# Patient Record
Sex: MALE | Race: BLACK OR AFRICAN AMERICAN | NOT HISPANIC OR LATINO | Employment: OTHER | URBAN - METROPOLITAN AREA
[De-identification: names, ages, dates, MRNs, and addresses within clinical notes are randomized per-mention and may not be internally consistent; named-entity substitution may affect disease eponyms.]

---

## 2017-08-30 ENCOUNTER — HOSPITAL ENCOUNTER (INPATIENT)
Facility: HOSPITAL | Age: 47
LOS: 8 days | Discharge: HOME | End: 2017-09-07
Attending: EMERGENCY MEDICINE | Admitting: OTOLARYNGOLOGY
Payer: MEDICAID

## 2017-08-30 DIAGNOSIS — C32.9 SQUAMOUS CELL CARCINOMA OF LARYNX: ICD-10-CM

## 2017-08-30 DIAGNOSIS — J38.7 MASS OF LARYNX: ICD-10-CM

## 2017-08-30 DIAGNOSIS — E11.65 UNCONTROLLED TYPE 2 DIABETES MELLITUS WITH HYPERGLYCEMIA, WITH LONG-TERM CURRENT USE OF INSULIN: ICD-10-CM

## 2017-08-30 DIAGNOSIS — Z78.9 ON ENTERAL NUTRITION: ICD-10-CM

## 2017-08-30 DIAGNOSIS — R00.0 SINUS TACHYCARDIA: ICD-10-CM

## 2017-08-30 DIAGNOSIS — J38.7 LARYNGEAL MASS: ICD-10-CM

## 2017-08-30 DIAGNOSIS — R06.1 STRIDOR: Primary | ICD-10-CM

## 2017-08-30 DIAGNOSIS — Z93.0 TRACHEOSTOMY DEPENDENCE: ICD-10-CM

## 2017-08-30 DIAGNOSIS — J15.1 PNEUMONIA OF RIGHT LOWER LOBE DUE TO PSEUDOMONAS SPECIES: ICD-10-CM

## 2017-08-30 DIAGNOSIS — R13.12 DYSPHAGIA, OROPHARYNGEAL: ICD-10-CM

## 2017-08-30 DIAGNOSIS — Z79.4 UNCONTROLLED TYPE 2 DIABETES MELLITUS WITH HYPERGLYCEMIA, WITH LONG-TERM CURRENT USE OF INSULIN: ICD-10-CM

## 2017-08-30 LAB
ALBUMIN SERPL BCP-MCNC: 3.5 G/DL
ALLENS TEST: ABNORMAL
ALP SERPL-CCNC: 60 U/L
ALT SERPL W/O P-5'-P-CCNC: 18 U/L
ANION GAP SERPL CALC-SCNC: 8 MMOL/L
APTT BLDCRRT: 24.5 SEC
AST SERPL-CCNC: 19 U/L
BASOPHILS # BLD AUTO: 0 K/UL
BASOPHILS NFR BLD: 0 %
BILIRUB SERPL-MCNC: 0.6 MG/DL
BUN SERPL-MCNC: 18 MG/DL
CALCIUM SERPL-MCNC: 9.6 MG/DL
CHLORIDE SERPL-SCNC: 98 MMOL/L
CO2 SERPL-SCNC: 34 MMOL/L
CREAT SERPL-MCNC: 0.9 MG/DL
DELSYS: ABNORMAL
DIFFERENTIAL METHOD: ABNORMAL
EOSINOPHIL # BLD AUTO: 0 K/UL
EOSINOPHIL NFR BLD: 0.1 %
ERYTHROCYTE [DISTWIDTH] IN BLOOD BY AUTOMATED COUNT: 15.8 %
EST. GFR  (AFRICAN AMERICAN): >60 ML/MIN/1.73 M^2
EST. GFR  (NON AFRICAN AMERICAN): >60 ML/MIN/1.73 M^2
GLUCOSE SERPL-MCNC: 78 MG/DL
HCO3 UR-SCNC: 37.1 MMOL/L (ref 24–28)
HCT VFR BLD AUTO: 37.7 %
HGB BLD-MCNC: 13.2 G/DL
INR PPP: 0.9
LYMPHOCYTES # BLD AUTO: 0.4 K/UL
LYMPHOCYTES NFR BLD: 4 %
MCH RBC QN AUTO: 30.7 PG
MCHC RBC AUTO-ENTMCNC: 35 G/DL
MCV RBC AUTO: 88 FL
MODE: ABNORMAL
MONOCYTES # BLD AUTO: 0.1 K/UL
MONOCYTES NFR BLD: 0.7 %
NEUTROPHILS # BLD AUTO: 9.7 K/UL
NEUTROPHILS NFR BLD: 95 %
PCO2 BLDA: 60.7 MMHG (ref 35–45)
PH SMN: 7.39 [PH] (ref 7.35–7.45)
PLATELET # BLD AUTO: 266 K/UL
PMV BLD AUTO: 9.4 FL
PO2 BLDA: 77 MMHG (ref 80–100)
POC BE: 12 MMOL/L
POC SATURATED O2: 95 % (ref 95–100)
POC TCO2: 39 MMOL/L (ref 23–27)
POTASSIUM SERPL-SCNC: 4.4 MMOL/L
PROT SERPL-MCNC: 7.5 G/DL
PROTHROMBIN TIME: 10 SEC
RBC # BLD AUTO: 4.3 M/UL
SAMPLE: ABNORMAL
SITE: ABNORMAL
SODIUM SERPL-SCNC: 140 MMOL/L
SP02: 100
WBC # BLD AUTO: 10.18 K/UL

## 2017-08-30 PROCEDURE — 85730 THROMBOPLASTIN TIME PARTIAL: CPT

## 2017-08-30 PROCEDURE — 25000003 PHARM REV CODE 250: Performed by: STUDENT IN AN ORGANIZED HEALTH CARE EDUCATION/TRAINING PROGRAM

## 2017-08-30 PROCEDURE — 88331 PATH CONSLTJ SURG 1 BLK 1SPC: CPT | Performed by: PATHOLOGY

## 2017-08-30 PROCEDURE — 36000 PLACE NEEDLE IN VEIN: CPT

## 2017-08-30 PROCEDURE — 63600175 PHARM REV CODE 636 W HCPCS: Performed by: STUDENT IN AN ORGANIZED HEALTH CARE EDUCATION/TRAINING PROGRAM

## 2017-08-30 PROCEDURE — 88331 PATH CONSLTJ SURG 1 BLK 1SPC: CPT | Mod: 26,,, | Performed by: PATHOLOGY

## 2017-08-30 PROCEDURE — 31575 DIAGNOSTIC LARYNGOSCOPY: CPT | Mod: ,,, | Performed by: OTOLARYNGOLOGY

## 2017-08-30 PROCEDURE — 99233 SBSQ HOSP IP/OBS HIGH 50: CPT | Mod: 25,,, | Performed by: OTOLARYNGOLOGY

## 2017-08-30 PROCEDURE — 99285 EMERGENCY DEPT VISIT HI MDM: CPT | Mod: 25

## 2017-08-30 PROCEDURE — 99284 EMERGENCY DEPT VISIT MOD MDM: CPT | Mod: ,,, | Performed by: EMERGENCY MEDICINE

## 2017-08-30 PROCEDURE — 88305 TISSUE EXAM BY PATHOLOGIST: CPT | Mod: 26,,, | Performed by: PATHOLOGY

## 2017-08-30 PROCEDURE — 80053 COMPREHEN METABOLIC PANEL: CPT

## 2017-08-30 PROCEDURE — 20000000 HC ICU ROOM

## 2017-08-30 PROCEDURE — A4216 STERILE WATER/SALINE, 10 ML: HCPCS | Performed by: STUDENT IN AN ORGANIZED HEALTH CARE EDUCATION/TRAINING PROGRAM

## 2017-08-30 PROCEDURE — 85025 COMPLETE CBC W/AUTO DIFF WBC: CPT

## 2017-08-30 PROCEDURE — 85610 PROTHROMBIN TIME: CPT

## 2017-08-30 PROCEDURE — 88305 TISSUE EXAM BY PATHOLOGIST: CPT | Performed by: PATHOLOGY

## 2017-08-30 RX ORDER — SODIUM CHLORIDE AND POTASSIUM CHLORIDE 150; 450 MG/100ML; MG/100ML
INJECTION, SOLUTION INTRAVENOUS CONTINUOUS
Status: DISCONTINUED | OUTPATIENT
Start: 2017-08-30 | End: 2017-08-31

## 2017-08-30 RX ORDER — HYDRALAZINE HYDROCHLORIDE 20 MG/ML
10 INJECTION INTRAMUSCULAR; INTRAVENOUS EVERY 6 HOURS PRN
Status: DISCONTINUED | OUTPATIENT
Start: 2017-08-30 | End: 2017-09-07 | Stop reason: HOSPADM

## 2017-08-30 RX ORDER — MORPHINE SULFATE 2 MG/ML
2 INJECTION, SOLUTION INTRAMUSCULAR; INTRAVENOUS EVERY 4 HOURS PRN
Status: DISCONTINUED | OUTPATIENT
Start: 2017-08-30 | End: 2017-09-01

## 2017-08-30 RX ORDER — ONDANSETRON 8 MG/1
8 TABLET, ORALLY DISINTEGRATING ORAL EVERY 8 HOURS PRN
Status: DISCONTINUED | OUTPATIENT
Start: 2017-08-30 | End: 2017-09-07 | Stop reason: HOSPADM

## 2017-08-30 RX ORDER — DIPHENHYDRAMINE HYDROCHLORIDE 50 MG/ML
25 INJECTION INTRAMUSCULAR; INTRAVENOUS EVERY 4 HOURS PRN
Status: DISCONTINUED | OUTPATIENT
Start: 2017-08-30 | End: 2017-09-07 | Stop reason: HOSPADM

## 2017-08-30 RX ORDER — HYDROCODONE BITARTRATE AND ACETAMINOPHEN 5; 325 MG/1; MG/1
1 TABLET ORAL EVERY 4 HOURS PRN
Status: DISCONTINUED | OUTPATIENT
Start: 2017-08-30 | End: 2017-09-01

## 2017-08-30 RX ORDER — INSULIN GLARGINE 100 [IU]/ML
20 INJECTION, SOLUTION SUBCUTANEOUS NIGHTLY
Status: ON HOLD | COMMUNITY
End: 2017-09-06 | Stop reason: HOSPADM

## 2017-08-30 RX ORDER — INSULIN ASPART 100 [IU]/ML
INJECTION, SOLUTION INTRAVENOUS; SUBCUTANEOUS
Status: ON HOLD | COMMUNITY
End: 2017-09-06 | Stop reason: HOSPADM

## 2017-08-30 RX ORDER — INSULIN ASPART 100 [IU]/ML
0-5 INJECTION, SOLUTION INTRAVENOUS; SUBCUTANEOUS EVERY 6 HOURS PRN
Status: DISCONTINUED | OUTPATIENT
Start: 2017-08-30 | End: 2017-09-01

## 2017-08-30 RX ORDER — SODIUM CHLORIDE 0.9 % (FLUSH) 0.9 %
3 SYRINGE (ML) INJECTION EVERY 8 HOURS
Status: DISCONTINUED | OUTPATIENT
Start: 2017-08-30 | End: 2017-09-07 | Stop reason: HOSPADM

## 2017-08-30 RX ORDER — GLUCAGON 1 MG
1 KIT INJECTION
Status: DISCONTINUED | OUTPATIENT
Start: 2017-08-30 | End: 2017-09-01

## 2017-08-30 RX ADMIN — POTASSIUM CHLORIDE AND SODIUM CHLORIDE: 450; 150 INJECTION, SOLUTION INTRAVENOUS at 11:08

## 2017-08-30 RX ADMIN — Medication 3 ML: at 10:08

## 2017-08-30 RX ADMIN — INSULIN ASPART 5 UNITS: 100 INJECTION, SOLUTION INTRAVENOUS; SUBCUTANEOUS at 11:08

## 2017-08-30 RX ADMIN — INSULIN DETEMIR 20 UNITS: 100 INJECTION, SOLUTION SUBCUTANEOUS at 10:08

## 2017-08-30 NOTE — ED PROVIDER NOTES
Encounter Date: 8/30/2017    SCRIBE #1 NOTE: I, Cele Torres, am scribing for, and in the presence of, Dr. Arroyo.       History     Chief Complaint   Patient presents with    Shortness of Breath     transfer from [facility] for tracheal mass.      Time seen by provider: 5:32 PM    This is a 47 y.o. male with a tracheal mass who presents from [facility] with complaint of SOB for approximately 1.5 months.  The patient notes he has not seen a physician for 4 months and that he at first thought that the discomfort was due to asthma. The patient denies trouble swallowing but indicates associated weight loss of 25 lbs over approximately 2-3 months. He reports improvement in the discomfort sitting up but worsening laying down.      The history is provided by the patient.     Review of patient's allergies indicates:  Allergies not on file  Past Medical History:   Diagnosis Date    Insulin dependent diabetes mellitus      History reviewed. No pertinent surgical history.  History reviewed. No pertinent family history.  Social History   Substance Use Topics    Smoking status: Not on file    Smokeless tobacco: Not on file    Alcohol use Not on file     Review of Systems   Constitutional: Positive for unexpected weight change (25 lbs weight loss).   HENT: Negative for trouble swallowing.    Eyes: Negative for visual disturbance.   Respiratory: Positive for shortness of breath.    Cardiovascular: Negative for chest pain.   Gastrointestinal: Negative for abdominal pain.   Genitourinary: Negative for dysuria.   Musculoskeletal: Negative for back pain.   Skin: Negative for pallor.   Neurological: Negative for headaches.       Physical Exam     Initial Vitals [08/30/17 1725]   BP Pulse Resp Temp SpO2   (!) 151/92 90 20 98.1 °F (36.7 °C) 98 %      MAP       111.67         Physical Exam    Nursing note and vitals reviewed.  Constitutional: He appears cachectic. He appears ill. No distress.   Hoarse voice. The  patient is not hypoxic.   HENT:   Head: Normocephalic and atraumatic.   Mouth/Throat: Oropharynx is clear and moist.   Eyes: Conjunctivae are normal.   Neck: Normal range of motion. Stridor present. Tracheal tenderness present. No tracheal deviation present.   Thyroid mass.   Cardiovascular: Normal rate, regular rhythm and normal heart sounds.   Pulmonary/Chest: Breath sounds normal. No respiratory distress.   Abdominal: Soft. He exhibits no distension. There is no tenderness.   Musculoskeletal: Normal range of motion.   Neurological: He is alert and oriented to person, place, and time.   Skin: Skin is warm and dry.         ED Course   Procedures  Labs Reviewed   CBC W/ AUTO DIFFERENTIAL - Abnormal; Notable for the following:        Result Value    RBC 4.30 (*)     Hemoglobin 13.2 (*)     Hematocrit 37.7 (*)     RDW 15.8 (*)     Gran # 9.7 (*)     Lymph # 0.4 (*)     Mono # 0.1 (*)     Gran% 95.0 (*)     Lymph% 4.0 (*)     Mono% 0.7 (*)     All other components within normal limits   COMPREHENSIVE METABOLIC PANEL - Abnormal; Notable for the following:     CO2 34 (*)     All other components within normal limits   ISTAT PROCEDURE - Abnormal; Notable for the following:     POC PCO2 60.7 (*)     POC PO2 77 (*)     POC HCO3 37.1 (*)     POC TCO2 39 (*)     All other components within normal limits   PROTIME-INR   APTT   BASIC METABOLIC PANEL   CBC W/ AUTO DIFFERENTIAL   POCT GLUCOSE MONITORING CONTINUOUS             Medical Decision Making:   History:   Old Medical Records: I decided to obtain old medical records.  Initial Assessment:   Emergent evaluation of a 47 y.o. male who presents as a transfer from outside facility with thyroid mass. He presented at the outside facility with stridor.  His symptoms progressively worsened over the last several months.  Now he is hoarse and with audible stridor.  He has had significant weight loss. The patient has been lost to follow up. He was sent here for ENT evaluation and  received Decadron and racemic epinephrine from outside facility.  ENT notified of his arrival. Airway is stable at this time.  Clinical Tests:   Lab Tests: Ordered and Reviewed  Other:   I have discussed this case with another health care provider.            Scribe Attestation:   Scribe #1: I performed the above scribed service and the documentation accurately describes the services I performed. I attest to the accuracy of the note.    Attending Attestation:           Physician Attestation for Scribe:  Physician Attestation Statement for Scribe #1: I, Dr. Arroyo, reviewed documentation, as scribed by Cele Torres in my presence, and it is both accurate and complete.         Attending ED Notes:   Evaluated by ENT, will admit to ICU for further management           ED Course      Clinical Impression:   The primary encounter diagnosis was Stridor. Diagnoses of Mass of larynx, Laryngeal mass, Dysphagia, oropharyngeal, and Sinus tachycardia were also pertinent to this visit.    Disposition:   Disposition: Admitted  Condition: Molly Arroyo MD  09/01/17 1544

## 2017-08-30 NOTE — ED TRIAGE NOTES
LOC: The patient is awake, alert and aware of environment with an appropriate affect, the patient is oriented x 3 and speaking appropriately.  APPEARANCE: Patient resting comfortably and in no acute distress, patient is clean and well groomed, patient's clothing is properly fastened.   SKIN: The skin is warm and dry, color consistent with ethnicity, patient has normal skin turgor and moist mucus membranes, skin intact, no breakdown or bruising noted.  MUSCULOSKELETAL: Patient moving all extremities spontaneously, no obvious swelling or deformities noted.  RESPIRATORY: Airway is open and patent, respirations are spontaneous, patient has a normal effort and rate, no accessory muscle use noted.  ABDOMEN: Soft and non tender to palpation, no distention noted.  NEUROLOGIC:  facial expression is symmetrical, patient moving all extremities spontaneously, normal sensation in all extremities when touched with a finger.  Follows all commands appropriately.  ENT: Pt with hoarseness when talking.  Pt reports pain to throat when talking.

## 2017-08-31 ENCOUNTER — ANESTHESIA EVENT (OUTPATIENT)
Dept: SURGERY | Facility: HOSPITAL | Age: 47
End: 2017-08-31
Payer: MEDICAID

## 2017-08-31 ENCOUNTER — SURGERY (OUTPATIENT)
Age: 47
End: 2017-08-31

## 2017-08-31 ENCOUNTER — ANESTHESIA (OUTPATIENT)
Dept: SURGERY | Facility: HOSPITAL | Age: 47
End: 2017-08-31
Payer: MEDICAID

## 2017-08-31 PROBLEM — R13.12 DYSPHAGIA, OROPHARYNGEAL: Status: ACTIVE | Noted: 2017-08-31

## 2017-08-31 PROBLEM — J38.7 LARYNGEAL MASS: Status: ACTIVE | Noted: 2017-08-31

## 2017-08-31 LAB
ANION GAP SERPL CALC-SCNC: 13 MMOL/L
APTT BLDCRRT: 25.3 SEC
BASOPHILS # BLD AUTO: 0 K/UL
BASOPHILS NFR BLD: 0 %
BUN SERPL-MCNC: 19 MG/DL
CALCIUM SERPL-MCNC: 9.6 MG/DL
CHLORIDE SERPL-SCNC: 97 MMOL/L
CO2 SERPL-SCNC: 23 MMOL/L
CREAT SERPL-MCNC: 1.2 MG/DL
DIFFERENTIAL METHOD: ABNORMAL
EOSINOPHIL # BLD AUTO: 0 K/UL
EOSINOPHIL NFR BLD: 0 %
ERYTHROCYTE [DISTWIDTH] IN BLOOD BY AUTOMATED COUNT: 15.4 %
EST. GFR  (AFRICAN AMERICAN): >60 ML/MIN/1.73 M^2
EST. GFR  (NON AFRICAN AMERICAN): >60 ML/MIN/1.73 M^2
GLUCOSE SERPL-MCNC: 313 MG/DL
HCT VFR BLD AUTO: 35.3 %
HGB BLD-MCNC: 12.2 G/DL
INR PPP: 1
LYMPHOCYTES # BLD AUTO: 0.9 K/UL
LYMPHOCYTES NFR BLD: 5.5 %
MAGNESIUM SERPL-MCNC: 2.2 MG/DL
MCH RBC QN AUTO: 31 PG
MCHC RBC AUTO-ENTMCNC: 34.6 G/DL
MCV RBC AUTO: 90 FL
MONOCYTES # BLD AUTO: 0.7 K/UL
MONOCYTES NFR BLD: 4.1 %
NEUTROPHILS # BLD AUTO: 14.9 K/UL
NEUTROPHILS NFR BLD: 90.2 %
PHOSPHATE SERPL-MCNC: 3.3 MG/DL
PLATELET # BLD AUTO: 282 K/UL
PMV BLD AUTO: 9.8 FL
POCT GLUCOSE: 141 MG/DL (ref 70–110)
POCT GLUCOSE: 372 MG/DL (ref 70–110)
POCT GLUCOSE: 444 MG/DL (ref 70–110)
POCT GLUCOSE: 483 MG/DL (ref 70–110)
POCT GLUCOSE: 57 MG/DL (ref 70–110)
POCT GLUCOSE: 71 MG/DL (ref 70–110)
POTASSIUM SERPL-SCNC: 5.2 MMOL/L
PROTHROMBIN TIME: 10.6 SEC
RBC # BLD AUTO: 3.93 M/UL
SODIUM SERPL-SCNC: 133 MMOL/L
WBC # BLD AUTO: 16.5 K/UL

## 2017-08-31 PROCEDURE — 27000190 HC CPAP FULL FACE MASK W/VALVE

## 2017-08-31 PROCEDURE — 85610 PROTHROMBIN TIME: CPT

## 2017-08-31 PROCEDURE — 85025 COMPLETE CBC W/AUTO DIFF WBC: CPT

## 2017-08-31 PROCEDURE — 63600175 PHARM REV CODE 636 W HCPCS: Performed by: STUDENT IN AN ORGANIZED HEALTH CARE EDUCATION/TRAINING PROGRAM

## 2017-08-31 PROCEDURE — 93005 ELECTROCARDIOGRAM TRACING: CPT

## 2017-08-31 PROCEDURE — 84100 ASSAY OF PHOSPHORUS: CPT

## 2017-08-31 PROCEDURE — 80048 BASIC METABOLIC PNL TOTAL CA: CPT

## 2017-08-31 PROCEDURE — 25000003 PHARM REV CODE 250: Performed by: STUDENT IN AN ORGANIZED HEALTH CARE EDUCATION/TRAINING PROGRAM

## 2017-08-31 PROCEDURE — 0B110F4 BYPASS TRACHEA TO CUTANEOUS WITH TRACHEOSTOMY DEVICE, OPEN APPROACH: ICD-10-PCS | Performed by: OTOLARYNGOLOGY

## 2017-08-31 PROCEDURE — 93010 ELECTROCARDIOGRAM REPORT: CPT | Mod: ,,, | Performed by: INTERNAL MEDICINE

## 2017-08-31 PROCEDURE — 85730 THROMBOPLASTIN TIME PARTIAL: CPT

## 2017-08-31 PROCEDURE — 25000003 PHARM REV CODE 250: Performed by: NURSE ANESTHETIST, CERTIFIED REGISTERED

## 2017-08-31 PROCEDURE — A4216 STERILE WATER/SALINE, 10 ML: HCPCS | Performed by: STUDENT IN AN ORGANIZED HEALTH CARE EDUCATION/TRAINING PROGRAM

## 2017-08-31 PROCEDURE — 25000003 PHARM REV CODE 250: Performed by: OTOLARYNGOLOGY

## 2017-08-31 PROCEDURE — 31720 CLEARANCE OF AIRWAYS: CPT

## 2017-08-31 PROCEDURE — 94002 VENT MGMT INPAT INIT DAY: CPT

## 2017-08-31 PROCEDURE — 20000000 HC ICU ROOM

## 2017-08-31 PROCEDURE — 43246 EGD PLACE GASTROSTOMY TUBE: CPT | Mod: ,,, | Performed by: SURGERY

## 2017-08-31 PROCEDURE — 36000709 HC OR TIME LEV III EA ADD 15 MIN: Performed by: OTOLARYNGOLOGY

## 2017-08-31 PROCEDURE — 99232 SBSQ HOSP IP/OBS MODERATE 35: CPT | Mod: 25,,, | Performed by: SURGERY

## 2017-08-31 PROCEDURE — 83735 ASSAY OF MAGNESIUM: CPT

## 2017-08-31 PROCEDURE — 25000003 PHARM REV CODE 250: Performed by: PHYSICIAN ASSISTANT

## 2017-08-31 PROCEDURE — D9220A PRA ANESTHESIA: Mod: CRNA,,, | Performed by: NURSE ANESTHETIST, CERTIFIED REGISTERED

## 2017-08-31 PROCEDURE — 63600175 PHARM REV CODE 636 W HCPCS: Performed by: NURSE ANESTHETIST, CERTIFIED REGISTERED

## 2017-08-31 PROCEDURE — 25500020 PHARM REV CODE 255: Performed by: OTOLARYNGOLOGY

## 2017-08-31 PROCEDURE — 27201423 OPTIME MED/SURG SUP & DEVICES STERILE SUPPLY: Performed by: OTOLARYNGOLOGY

## 2017-08-31 PROCEDURE — 31600 PLANNED TRACHEOSTOMY: CPT | Mod: ,,, | Performed by: OTOLARYNGOLOGY

## 2017-08-31 PROCEDURE — D9220A PRA ANESTHESIA: Mod: ANES,,, | Performed by: ANESTHESIOLOGY

## 2017-08-31 PROCEDURE — 63600175 PHARM REV CODE 636 W HCPCS: Performed by: PHYSICIAN ASSISTANT

## 2017-08-31 PROCEDURE — 0CBS8ZX EXCISION OF LARYNX, VIA NATURAL OR ARTIFICIAL OPENING ENDOSCOPIC, DIAGNOSTIC: ICD-10-PCS | Performed by: OTOLARYNGOLOGY

## 2017-08-31 PROCEDURE — 36415 COLL VENOUS BLD VENIPUNCTURE: CPT

## 2017-08-31 PROCEDURE — 0DH63UZ INSERTION OF FEEDING DEVICE INTO STOMACH, PERCUTANEOUS APPROACH: ICD-10-PCS | Performed by: SURGERY

## 2017-08-31 PROCEDURE — 37000008 HC ANESTHESIA 1ST 15 MINUTES: Performed by: OTOLARYNGOLOGY

## 2017-08-31 PROCEDURE — 27000221 HC OXYGEN, UP TO 24 HOURS

## 2017-08-31 PROCEDURE — 99900035 HC TECH TIME PER 15 MIN (STAT)

## 2017-08-31 PROCEDURE — 37000009 HC ANESTHESIA EA ADD 15 MINS: Performed by: OTOLARYNGOLOGY

## 2017-08-31 PROCEDURE — 31535 LARYNGOSCOPY W/BIOPSY: CPT | Mod: 51,,, | Performed by: OTOLARYNGOLOGY

## 2017-08-31 PROCEDURE — 36000708 HC OR TIME LEV III 1ST 15 MIN: Performed by: OTOLARYNGOLOGY

## 2017-08-31 RX ORDER — HYDROMORPHONE HYDROCHLORIDE 1 MG/ML
0.5 INJECTION, SOLUTION INTRAMUSCULAR; INTRAVENOUS; SUBCUTANEOUS EVERY 4 HOURS PRN
Status: DISCONTINUED | OUTPATIENT
Start: 2017-08-31 | End: 2017-08-31

## 2017-08-31 RX ORDER — MIDAZOLAM HYDROCHLORIDE 1 MG/ML
6 INJECTION INTRAMUSCULAR; INTRAVENOUS
Status: DISCONTINUED | OUTPATIENT
Start: 2017-08-31 | End: 2017-09-07 | Stop reason: HOSPADM

## 2017-08-31 RX ORDER — CEFAZOLIN SODIUM 2 G/50ML
2 SOLUTION INTRAVENOUS
Status: DISCONTINUED | OUTPATIENT
Start: 2017-08-31 | End: 2017-09-01

## 2017-08-31 RX ORDER — MIDAZOLAM HYDROCHLORIDE 1 MG/ML
INJECTION, SOLUTION INTRAMUSCULAR; INTRAVENOUS
Status: DISCONTINUED | OUTPATIENT
Start: 2017-08-31 | End: 2017-08-31

## 2017-08-31 RX ORDER — VASOPRESSIN 20 [USP'U]/ML
INJECTION, SOLUTION INTRAMUSCULAR; SUBCUTANEOUS
Status: DISCONTINUED | OUTPATIENT
Start: 2017-08-31 | End: 2017-08-31

## 2017-08-31 RX ORDER — LIDOCAINE HYDROCHLORIDE AND EPINEPHRINE 10; 10 MG/ML; UG/ML
INJECTION, SOLUTION INFILTRATION; PERINEURAL
Status: DISCONTINUED | OUTPATIENT
Start: 2017-08-31 | End: 2017-08-31 | Stop reason: HOSPADM

## 2017-08-31 RX ORDER — HYDROMORPHONE HYDROCHLORIDE 2 MG/ML
INJECTION, SOLUTION INTRAMUSCULAR; INTRAVENOUS; SUBCUTANEOUS
Status: DISPENSED
Start: 2017-08-31 | End: 2017-08-31

## 2017-08-31 RX ORDER — KETAMINE HCL IN 0.9 % NACL 50 MG/5 ML
SYRINGE (ML) INTRAVENOUS
Status: DISCONTINUED | OUTPATIENT
Start: 2017-08-31 | End: 2017-08-31

## 2017-08-31 RX ORDER — METOPROLOL TARTRATE 1 MG/ML
5 INJECTION, SOLUTION INTRAVENOUS ONCE
Status: COMPLETED | OUTPATIENT
Start: 2017-08-31 | End: 2017-08-31

## 2017-08-31 RX ORDER — PHENYLEPHRINE HYDROCHLORIDE 10 MG/ML
INJECTION INTRAVENOUS
Status: DISCONTINUED | OUTPATIENT
Start: 2017-08-31 | End: 2017-08-31

## 2017-08-31 RX ORDER — SODIUM CHLORIDE 9 MG/ML
INJECTION, SOLUTION INTRAVENOUS CONTINUOUS
Status: DISCONTINUED | OUTPATIENT
Start: 2017-08-31 | End: 2017-09-01

## 2017-08-31 RX ORDER — HYDROMORPHONE HYDROCHLORIDE 1 MG/ML
0.5 INJECTION, SOLUTION INTRAMUSCULAR; INTRAVENOUS; SUBCUTANEOUS EVERY 4 HOURS PRN
Status: DISCONTINUED | OUTPATIENT
Start: 2017-08-31 | End: 2017-09-01

## 2017-08-31 RX ORDER — LORAZEPAM 2 MG/ML
1 INJECTION INTRAMUSCULAR ONCE
Status: COMPLETED | OUTPATIENT
Start: 2017-08-31 | End: 2017-08-31

## 2017-08-31 RX ORDER — LIDOCAINE HYDROCHLORIDE 40 MG/ML
SOLUTION TOPICAL
Status: DISCONTINUED | OUTPATIENT
Start: 2017-08-31 | End: 2017-08-31 | Stop reason: HOSPADM

## 2017-08-31 RX ORDER — DEXAMETHASONE SODIUM PHOSPHATE 4 MG/ML
8 INJECTION, SOLUTION INTRA-ARTICULAR; INTRALESIONAL; INTRAMUSCULAR; INTRAVENOUS; SOFT TISSUE
Status: DISCONTINUED | OUTPATIENT
Start: 2017-08-31 | End: 2017-09-01

## 2017-08-31 RX ORDER — ENOXAPARIN SODIUM 100 MG/ML
40 INJECTION SUBCUTANEOUS EVERY 24 HOURS
Status: DISCONTINUED | OUTPATIENT
Start: 2017-08-31 | End: 2017-09-07 | Stop reason: HOSPADM

## 2017-08-31 RX ORDER — ROCURONIUM BROMIDE 10 MG/ML
50 INJECTION, SOLUTION INTRAVENOUS
Status: DISCONTINUED | OUTPATIENT
Start: 2017-08-31 | End: 2017-09-07 | Stop reason: HOSPADM

## 2017-08-31 RX ORDER — FENTANYL CITRATE 50 UG/ML
200 INJECTION, SOLUTION INTRAMUSCULAR; INTRAVENOUS
Status: DISCONTINUED | OUTPATIENT
Start: 2017-08-31 | End: 2017-09-07 | Stop reason: HOSPADM

## 2017-08-31 RX ADMIN — FENTANYL CITRATE 200 MCG: 50 INJECTION INTRAMUSCULAR; INTRAVENOUS at 12:08

## 2017-08-31 RX ADMIN — EPHEDRINE SULFATE 10 MG: 50 INJECTION, SOLUTION INTRAMUSCULAR; INTRAVENOUS; SUBCUTANEOUS at 07:08

## 2017-08-31 RX ADMIN — MIDAZOLAM HYDROCHLORIDE 1 MG: 1 INJECTION, SOLUTION INTRAMUSCULAR; INTRAVENOUS at 07:08

## 2017-08-31 RX ADMIN — PHENYLEPHRINE HYDROCHLORIDE 200 MCG: 10 INJECTION INTRAVENOUS at 07:08

## 2017-08-31 RX ADMIN — DEXTROSE MONOHYDRATE 12.5 G: 25 INJECTION, SOLUTION INTRAVENOUS at 01:08

## 2017-08-31 RX ADMIN — Medication 3 ML: at 05:08

## 2017-08-31 RX ADMIN — DEXTROSE MONOHYDRATE 12.5 G: 25 INJECTION, SOLUTION INTRAVENOUS at 11:08

## 2017-08-31 RX ADMIN — EPHEDRINE SULFATE 15 MG: 50 INJECTION, SOLUTION INTRAMUSCULAR; INTRAVENOUS; SUBCUTANEOUS at 08:08

## 2017-08-31 RX ADMIN — HYDROMORPHONE HYDROCHLORIDE 0.5 MG: 1 INJECTION, SOLUTION INTRAMUSCULAR; INTRAVENOUS; SUBCUTANEOUS at 09:08

## 2017-08-31 RX ADMIN — DEXTROSE MONOHYDRATE 12.5 G: 25 INJECTION, SOLUTION INTRAVENOUS at 09:08

## 2017-08-31 RX ADMIN — Medication 3 ML: at 09:08

## 2017-08-31 RX ADMIN — METOPROLOL TARTRATE 5 MG: 5 INJECTION INTRAVENOUS at 10:08

## 2017-08-31 RX ADMIN — LIDOCAINE HYDROCHLORIDE 3 ML: 40 SPRAY LARYNGEAL; TRANSTRACHEAL at 08:08

## 2017-08-31 RX ADMIN — HYDRALAZINE HYDROCHLORIDE 10 MG: 20 INJECTION INTRAMUSCULAR; INTRAVENOUS at 10:08

## 2017-08-31 RX ADMIN — VASOPRESSIN 2 UNITS: 20 INJECTION INTRAVENOUS at 08:08

## 2017-08-31 RX ADMIN — SODIUM CHLORIDE: 0.9 INJECTION, SOLUTION INTRAVENOUS at 06:08

## 2017-08-31 RX ADMIN — MIDAZOLAM HYDROCHLORIDE 6 MG: 1 INJECTION, SOLUTION INTRAMUSCULAR; INTRAVENOUS at 12:08

## 2017-08-31 RX ADMIN — ENOXAPARIN SODIUM 40 MG: 100 INJECTION SUBCUTANEOUS at 05:08

## 2017-08-31 RX ADMIN — Medication 1 MG: at 07:08

## 2017-08-31 RX ADMIN — Medication 3 ML: at 01:08

## 2017-08-31 RX ADMIN — MORPHINE SULFATE 2 MG: 2 INJECTION, SOLUTION INTRAMUSCULAR; INTRAVENOUS at 02:08

## 2017-08-31 RX ADMIN — ROCURONIUM BROMIDE 50 MG: 10 INJECTION, SOLUTION INTRAVENOUS at 12:08

## 2017-08-31 RX ADMIN — LORAZEPAM 1 MG: 2 INJECTION INTRAMUSCULAR; INTRAVENOUS at 10:08

## 2017-08-31 RX ADMIN — HYDRALAZINE HYDROCHLORIDE 10 MG: 20 INJECTION INTRAMUSCULAR; INTRAVENOUS at 03:08

## 2017-08-31 RX ADMIN — Medication 20 MG: at 07:08

## 2017-08-31 RX ADMIN — MIDAZOLAM HYDROCHLORIDE 2 MG: 1 INJECTION, SOLUTION INTRAMUSCULAR; INTRAVENOUS at 07:08

## 2017-08-31 RX ADMIN — IOHEXOL 75 ML: 350 INJECTION, SOLUTION INTRAVENOUS at 05:08

## 2017-08-31 RX ADMIN — SODIUM CHLORIDE, SODIUM GLUCONATE, SODIUM ACETATE, POTASSIUM CHLORIDE, MAGNESIUM CHLORIDE, SODIUM PHOSPHATE, DIBASIC, AND POTASSIUM PHOSPHATE: .53; .5; .37; .037; .03; .012; .00082 INJECTION, SOLUTION INTRAVENOUS at 07:08

## 2017-08-31 RX ADMIN — LIDOCAINE HYDROCHLORIDE AND EPINEPHRINE 10 ML: 10; 10 INJECTION, SOLUTION INFILTRATION; PERINEURAL at 07:08

## 2017-08-31 NOTE — PROGRESS NOTES
[facility]  Otorhinolaryngology-Head & Neck Surgery  Progress Note    Subjective:     Post-Op Info:  Procedure(s) (LRB):  TRACHEOTOMY- AWAKE (N/A)   Day of Surgery  Hospital Day: 2     Interval History: NAEON. No desats.    Medications:  Continuous Infusions:   sodium chloride 0.9%       Scheduled Meds:   insulin detemir  20 Units Subcutaneous QHS    sodium chloride 0.9%  3 mL Intravenous Q8H     PRN Meds:dextrose 50%, diphenhydrAMINE, glucagon (human recombinant), hydrALAZINE, hydrocodone-acetaminophen 5-325mg, insulin aspart, morphine, ondansetron     Review of patient's allergies indicates:  No Known Allergies  Objective:     Vital Signs (24h Range):  Temp:  [98.1 °F (36.7 °C)-98.7 °F (37.1 °C)] 98.5 °F (36.9 °C)  Pulse:  [] 78  Resp:  [0-49] 39  SpO2:  [92 %-100 %] 99 %  BP: (132-179)/(74-98) 179/86        Lines/Drains/Airways     Peripheral Intravenous Line                 Peripheral IV - Single Lumen 08/30/17 2100 Left Wrist less than 1 day                Physical Exam  Resting in bed on room air  Inspiratory stridor, working hard to breath this morning, but satting high 90's  Neck: palpable landmarks    Significant Labs:  All pertinent labs from the last 24 hours have been reviewed.    Significant Diagnostics:  None    Assessment/Plan:     Laryngeal mass    Mr. Esteban is a 48 yo M with DM and HTN who presents as transfer from [facility] with stridor, hoarseness, and known laryngeal mass with CT evidence of bulky laryngeal disease very concerning for malignancy. No immediate airway risk as patient has had slowly progressive stridor and hoarseness fo r past 2 months. ABG with pCO2 of 60.7, pH 7.39. WBC 10.    - To OR for awake trach this morning  - Patient could potentially be intubated with small ETT if necessary o/n  - Continuous pulse ox/ telemetry  - NPO with MIVF  - Will obtain new diagnostic biopsies  - POCT glucose TID; insulin per home regiment  - Discussed  with Dr. Deleon, who agrees with plan            Quinn Cadena Jr., MD  Otorhinolaryngology-Head & Neck Surgery  [facility]

## 2017-08-31 NOTE — TRANSFER OF CARE
"Anesthesia Transfer of Care Note    Patient: Luís Esteban    Procedure(s) Performed: Procedure(s) (LRB):  TRACHEOTOMY- AWAKE (N/A)  LARYNGOSCOPY (N/A)    Patient location: ICU    Anesthesia Type: general    Transport from OR: Transported from OR intubated on 100% O2 by AMBU with assisted ventilation. Continuous ECG monitoring in transport. Continuous SpO2 monitoring in transport    Post pain: adequate analgesia    Post assessment: no apparent anesthetic complications and tolerated procedure well    Post vital signs: stable    Level of consciousness: sedated    Nausea/Vomiting: no nausea/vomiting    Complications: none    Transfer of care protocol was followed      Last vitals:   Visit Vitals  /72   Pulse 87   Temp 36.4 °C (97.5 °F) (Oral)   Resp (!) 21   Ht 5' 11" (1.803 m)   Wt 61 kg (134 lb 7.7 oz)   SpO2 100%   BMI 18.76 kg/m²     "

## 2017-08-31 NOTE — OP NOTE
DATE OF PROCEDURE:  08/31/2017    PREOPERATIVE DIAGNOSIS:  Obstructing larynx mass.    POSTOPERATIVE DIAGNOSIS:  Squamous cell carcinoma of the larynx.    PROCEDURE:  1.  Awake tracheostomy.  2.  Direct laryngoscopy with biopsy with use of operating telescope.    SURGEON:  Mk Deleon M.D.    ASSISTANTS:  1.  Wiliam Meeks M.D. (RES).  2.  Jacob Brunner, M.D. (RES).    ANESTHESIA:  Local/MAC/general endotracheal.    ESTIMATED BLOOD LOSS:  10 mL.    SPECIMENS:  Larynx mass for frozen and permanent.    INDICATIONS FOR PROCEDURE:  Mr. Esteban is a 47-year-old gentleman who   presented to the [facility] ER last night with a complaint of progressive shortness   of breath.  He reported a history of a biopsy of the larynx lesion in January   of this year.  There was concern that this was malignant at that time; however,   no treatment was rendered.  He reported that over the last several days, he has   become increasingly dyspneic, particularly when lying supine.  He was   transferred here from [facility] in [place].  Upon arrival,   he was noted to have hoarseness and inspiratory stridor.  On examination, he   was noted to have a large obstructing larynx mass.  However, his airway was   noted to be stable and had not significantly worsened over the last several   days.  In light of these findings, it was recommended that we proceed to the   Operating Room the following morning for awake tracheostomy and subsequently DL   with biopsy.  He was apprised of the risks, benefits and alternatives to   surgery.  In spite of the risks inherent to surgery, he provided informed   consent.    PROCEDURE IN DETAIL:  The patient was taken to the Operating Room and placed on   the operating table in supine position.  IV sedation was administered by the   Anesthesia Team and 10 mL of 1% lidocaine with epinephrine were injected at the   intended incision site, roughly at the level of the cricoid cartilage.  The  neck   was then prepped and draped in standard sterile fashion.    To begin, the Bovie on cutting current was utilized to incise the skin with   sharp dissection proceeding through the underlying subcutaneous tissue and   platysma muscle.  The midline cervical raphae was identified and bluntly   dissected, and the strap musculature was retracted laterally on each side.    Blunt dissection then proceeded down to the pretracheal fascia.  This was   cauterized with the bipolar and sharply incised utilizing tenotomy scissors.    Tracheostomy was then created between the first and second tracheal rings and   dilated with trach .  A size 6 cuffed Shiley tracheostomy tube was   placed into the airway.  It was attached to the anesthesia circuit, and   placement of the airway was confirmed with return of end tidal CO2.  General   endotracheal anesthesia was then administered by the Anesthesia Team.  The tube   was then secured at the 4 corners with silk suture and a tracheostomy tie.    Next, our attention was turned to the direct laryngoscopy.  The upper gingiva   were protected with a mouth guard, and the Dedo laryngoscope was inserted   through the mouth and advanced into the oropharynx.  The oropharyngeal   structures including the vallecula, base of tongue, lateral and posterior   pharyngeal walls were unremarkable.  The glottis, particularly on the lingual   surface, was unremarkable.  The piriform sinuses and postcricoid region were   free of tumor.  Within the endolarynx, there was noted to be an endophytic tumor   occupying the left side.  The glottic inlet was completely obstructed.  Several   representative biopsies of this lesion were taken and sent to Pathology for   frozen section and permanent analysis.  After several moments had elapsed, the   pathologist phoned to the room to report that our specimen was consistent with   squamous cell carcinoma.  At this time, the procedure was deemed concluded and    the patient was handed back to Anesthesia and transported to the ICU in   satisfactory condition.  There were no intraoperative complications.  I was   present and participated in the entire procedure.      CPH/ANA  dd: 08/31/2017 08:56:44 (CDT)  td: 08/31/2017 09:37:38 (CDT)  Doc ID   #[id number]  Job ID #[id number]    CC:

## 2017-08-31 NOTE — NURSING
PEG tube inserted at bedside. Pt tolerated well. 6mg of versed given, 50 mg of rocronium, and 200 of fentanyl. sats stabilized by bagging during procedure. MAP >65 throughout procedure. Will continue to monitor.

## 2017-08-31 NOTE — ANESTHESIA PREPROCEDURE EVALUATION
08/31/2017  Luís Esteban is a 47 y.o., male c PmHx of breath, dysphagia, and 25 lb weight loss over the past 2 months.  He saw a physician at an OSH at the onset of sxs who told him it was a benign mass, Symptoms have rapidly progressed over past week 2 weeks with decreased PO intake. ENT saw patient in ED and performed fiberoptic laryngoscopy which showed large, obstructing laryngeal tumor worrisome for malignancy.     He is evaluated for below procedure.    Pre-operative evaluation for Procedure(s) (LRB):  TRACHEOTOMY- AWAKE (N/A)    IV Access:  PIV 20g LW    Oxygen/Ventilatory Requirements:  2L via NC    Infusions:   0.45 % NaCl with KCl 20 mEq 100 mL/hr at 08/31/17 0500         Last Airway:    None on file    Patient Active Problem List   Diagnosis    Laryngeal mass    Stridor       Review of patient's allergies indicates:  No Known Allergies    No current facility-administered medications on file prior to encounter.      No current outpatient prescriptions on file prior to encounter.       No past surgical history on file.    Social History     Social History    Marital status: Single     Spouse name: N/A    Number of children: N/A    Years of education: N/A     Occupational History    Not on file.     Social History Main Topics    Smoking status: Not on file    Smokeless tobacco: Not on file    Alcohol use Not on file    Drug use: Unknown    Sexual activity: Not on file     Other Topics Concern    Not on file     Social History Narrative    No narrative on file         Vital Signs Range (Last 24H):  Temp:  [36.7 °C (98.1 °F)-37.1 °C (98.7 °F)]   Pulse:  []   Resp:  [0-49]   BP: (132-179)/(74-98)   SpO2:  [92 %-100 %]       CBC:   Recent Labs      08/30/17   1806  08/31/17   0421   WBC  10.18  16.50*   RBC  4.30*  3.93*   HGB  13.2*  12.2*   HCT  37.7*  35.3*   PLT  266  282    MCV  88  90   MCH  30.7  31.0   MCHC  35.0  34.6       CMP:   Recent Labs      08/30/17   1806  08/31/17   0317   NA  140  133*   K  4.4  5.2*   CL  98  97   CO2  34*  23   BUN  18  19   CREATININE  0.9  1.2   GLU  78  313*   MG   --   2.2   PHOS   --   3.3   CALCIUM  9.6  9.6   ALBUMIN  3.5   --    PROT  7.5   --    ALKPHOS  60   --    ALT  18   --    AST  19   --    BILITOT  0.6   --        INR  Recent Labs      08/30/17   1806  08/31/17   0421   INR  0.9  1.0   APTT  24.5  25.3           Diagnostic Studies:      EKG:  None on file    2D Echo:  None on file    Anesthesia Evaluation    I have reviewed the Patient Summary Reports.    I have reviewed the Nursing Notes.   I have reviewed the Medications.     Review of Systems  Anesthesia Hx:  No problems with previous Anesthesia  Neg history of prior surgery. Denies Family Hx of Anesthesia complications.   Denies Personal Hx of Anesthesia complications.   Social:  Smoker, Alcohol Use    Hematology/Oncology:  Hematology Normal      Denies Current/Recent Cancer --  Denies Cancer in past history:    Cardiovascular:  Cardiovascular Normal     Pulmonary:   Denies COPD.  Denies Asthma.  Denies Shortness of breath.    Renal/:  Renal/ Normal     Hepatic/GI:  Hepatic/GI Normal    Neurological:  Neurology Normal    Endocrine:   Diabetes        Physical Exam  General:  Well nourished    Airway/Jaw/Neck:  Airway Findings: Mouth Opening: Normal General Airway Assessment: Adult  Mallampati: II  Improves to I with phonation.  TM Distance: Normal, at least 6 cm      Dental:  Dental Findings: In tact   Chest/Lungs:  Chest/Lungs Findings: Clear to auscultation     Heart/Vascular:  Heart Findings: Rate: Normal        Mental Status:  Mental Status Findings:  Cooperative         Anesthesia Plan  Type of Anesthesia, risks & benefits discussed:  Anesthesia Type:  general  Patient's Preference:   Intra-op Monitoring Plan:   Intra-op Monitoring Plan Comments:   Post Op Pain Control  Plan:   Post Op Pain Control Plan Comments:   Induction:   IV  Beta Blocker:  Patient is not currently on a Beta-Blocker (No further documentation required).       Informed Consent: Patient understands risks and agrees with Anesthesia plan.  Questions answered. Anesthesia consent signed with patient.  ASA Score: 3     Day of Surgery Review of History & Physical:    H&P update referred to the surgeon.         Ready For Surgery From Anesthesia Perspective.

## 2017-08-31 NOTE — ASSESSMENT & PLAN NOTE
Mr. Esteban is a 46 yo M with DM and HTN who presents as transfer from [facility] with stridor, hoarseness, and known laryngeal mass with CT evidence of bulky laryngeal disease very concerning for malignancy. No immediate airway risk as patient has had slowly progressive stridor and hoarseness fo r past 2 months. ABG with pCO2 of 60.7, pH 7.39. WBC 10.    - To OR for awake trach this morning  - Patient could potentially be intubated with small ETT if necessary o/n  - Continuous pulse ox/ telemetry  - NPO with MIVF  - Will obtain new diagnostic biopsies  - POCT glucose TID; insulin per home regiment  - Discussed with Dr. Deleon, who agrees with plan

## 2017-08-31 NOTE — CONSULTS
History & Physical  Surgery      SUBJECTIVE:     Chief Complaint/Reason for Consult: PEG placement    History of Present Illness: Luís Esteban is a 47 y.o. male with DM and HTN who presents as transfer from [facility] with stridor, hoarseness, and known laryngeal mass. Patient reports SOB, orthopnea, and hoarseness for past 2 months. He also reports about 2 months ago he was seen by ENT who performed biopsy of vocal cord mass showing benign lesion. Patient reports he was supposed to follow-up, but hasn't been able to recently. Endorses 20 pound weight loss in past 2-3 months. Also reports some difficulty eating certain foods, but no issue otherwise with swallowing. Patient was smoker, but quit 16 years ago. Denies recent fevers or chills. Sats stable in ED during interview. CT scan on disc from OSH today shows laryngeal mass with airway narrowing.  Awake trach performed by ENT this AM.  Biopsy pending for laryngeal mass but malignancy favored.  ENT requesting PEG placement.      No current facility-administered medications on file prior to encounter.      No current outpatient prescriptions on file prior to encounter.       Review of patient's allergies indicates:  No Known Allergies    Past Medical History:   Diagnosis Date    Insulin dependent diabetes mellitus      History reviewed. No pertinent surgical history.  History reviewed. No pertinent family history.  Social History   Substance Use Topics    Smoking status: Not on file    Smokeless tobacco: Not on file    Alcohol use Not on file        Review of Systems   Unable to obtain, patient still somewhat sedated from tracheostomy this AM    OBJECTIVE:     Vital Signs (Most Recent)  Temp: 97.6 °F (36.4 °C) (08/31/17 1100)  Pulse: (!) 117 (08/31/17 1100)  Resp: (!) 34 (08/31/17 1100)  BP: (!) 140/74 (08/31/17 1100)  SpO2: (!) 94 % (08/31/17 1100)    Physical Exam   Constitutional: He is oriented to person, place, and time. He appears  well-developed and well-nourished. No distress.   HENT:   Head: Normocephalic.   Tracheostomy in place with small amount of surrounding serosanguinous drainage   Eyes: Conjunctivae and EOM are normal. Pupils are equal, round, and reactive to light.   Neck: Normal range of motion. Neck supple. No thyromegaly present.   Cardiovascular: Normal rate, regular rhythm and normal heart sounds.    Pulmonary/Chest: Effort normal and breath sounds normal. No respiratory distress. He has no wheezes.   Abdominal: Soft. Bowel sounds are normal. He exhibits no distension and no mass. There is no tenderness. There is no rebound and no guarding.   No scars,  thin.   Musculoskeletal: Normal range of motion. He exhibits no edema.   Neurological: He is alert and oriented to person, place, and time.   Skin: Skin is warm and dry.   Psychiatric: He has a normal mood and affect.     Laboratory  CBC:   Recent Labs  Lab 08/31/17  0421   WBC 16.50*   RBC 3.93*   HGB 12.2*   HCT 35.3*      MCV 90   MCH 31.0   MCHC 34.6     CMP:   Recent Labs  Lab 08/30/17  1806 08/31/17  0317   GLU 78 313*   CALCIUM 9.6 9.6   ALBUMIN 3.5  --    PROT 7.5  --     133*   K 4.4 5.2*   CO2 34* 23   CL 98 97   BUN 18 19   CREATININE 0.9 1.2   ALKPHOS 60  --    ALT 18  --    AST 19  --    BILITOT 0.6  --      Coagulation:   Recent Labs  Lab 08/31/17  0421   LABPROT 10.6   INR 1.0   APTT 25.3       ASSESSMENT/PLAN:     A/P:  46 yo M with laryngeal CA s/p awake trach today in need of PEG    Bedside PEG today  Consent obtained from patient's sister Lisy Larsen Kal  General Surgery, PGY-5  Pager # [phone]

## 2017-08-31 NOTE — PROGRESS NOTES
Pt transferred to SICU RM 6085 from ED. Pt transported via wheelair, on RA. Tolerating well. Pt AAOx4. Pt opens eyes spontaneously. Moving all extremities freely/equally. Following commands. Nods/gestures appropriately. Plan of care reviewed with pt and family. MD notified of pts arrival to the unit. See flowsheet for details.

## 2017-08-31 NOTE — ANESTHESIA POSTPROCEDURE EVALUATION
"Anesthesia Post Evaluation    Patient: Luís Esteban    Procedure(s) Performed: Procedure(s) (LRB):  TRACHEOTOMY- AWAKE (N/A)  LARYNGOSCOPY (N/A)    Final Anesthesia Type: general  Patient location during evaluation: ICU  Patient participation: No - Unable to Participate, Intubation  Level of consciousness: sedated  Post-procedure vital signs: reviewed and stable  Pain management: adequate  Airway patency: patent  PONV status at discharge: No PONV  Anesthetic complications: no      Cardiovascular status: hemodynamically stable  Respiratory status: intubated and ventilator  Hydration status: euvolemic  Follow-up not needed.        Visit Vitals  /72   Pulse 87   Temp 36.4 °C (97.5 °F) (Oral)   Resp (!) 21   Ht 5' 11" (1.803 m)   Wt 61 kg (134 lb 7.7 oz)   SpO2 100%   BMI 18.76 kg/m²       Pain/Wade Score: Pain Assessment Performed: Yes (8/31/2017  3:00 AM)  Presence of Pain: complains of pain/discomfort (8/31/2017  3:00 AM)  Pain Rating Prior to Med Admin: 7 (8/31/2017  2:16 AM)  Pain Rating Post Med Admin: 3 (8/31/2017  2:46 AM)      "

## 2017-08-31 NOTE — BRIEF OP NOTE
[facility]  Surgery Department  Operative Note    SUMMARY     Date of Procedure: 8/31/2017     Procedure: Procedure(s) (LRB):  TRACHEOTOMY- AWAKE (N/A)     Surgeon(s) and Role:     * Mk Deleon MD - Primary     * Wiliam Meeks MD - Resident - Assisting     * Jacob Patrick Brunner, MD - Resident - Assisting        Pre-Operative Diagnosis: Stridor [R06.1]  Mass of larynx [J38.7]    Post-Operative Diagnosis: Post-Op Diagnosis Codes:     * Stridor [R06.1]     * Mass of larynx [J38.7]    Anesthesia: Local    Technical Procedures Used: awake tracheostomy    Description of the Findings of the Procedure: see op note      Complications: No    Estimated Blood Loss (EBL): * No values recorded between 8/31/2017 12:00 AM and 8/31/2017  7:46 AM *           Implants: * No implants in log *    Specimens:   Specimen (12h ago through future)    None                  Condition: Stable    Disposition: ICU - extubated and stable.    Attestation: Dr. Deleon was present and scrubbed for the entire procedure

## 2017-08-31 NOTE — CONSULTS
[facility]  Otorhinolaryngology-Head & Neck Surgery  Consult Note    Patient Name: Luís Esteban  MRN: [id number]  Code Status: Full Code  Admission Date: 8/30/2017  Hospital Length of Stay: 0 days  Attending Physician: Adelaide Arroyo MD  Primary Care Provider: Primary Doctor No    Patient information was obtained from patient, relative(s), past medical records and ER records.     Inpatient consult to ENT  Consult performed by: ELIJAH TORO  Consult ordered by: ELIJAH TORO  Reason for consult: stridor, laryngeal mass        Subjective:     Chief Complaint/Reason for Admission: Stridor, laryngeal mass    History of Present Illness: Mr. Esteban is a 48 yo M with DM and HTN who presents as transfer from [facility] with stridor, hoarseness, and known laryngeal mass. Patient reports SOB, orthopnea, and hoarseness for past 2 months. He also reports about 2 months ago he was seen by ENT who performed biopsy of vocal cord mass showing benign lesion. Patient reports he was supposed to follow-up, but hasn't been able to recently. Endorses 20 pound weight loss in past 2-3 months. Also reports some difficulty eating certain foods, but no issue otherwise with swallowing. Patient was smoker, but quit 16 years ago. Denies recent fevers or chills. Sats stable in ED during interview. CT scan on disc from OSH today shows laryngeal mass with airway narrowing.    Medications:  Continuous Infusions:   0.45 % NaCl with KCl 20 mEq       Scheduled Meds:   insulin detemir  20 Units Subcutaneous QHS    sodium chloride 0.9%  3 mL Intravenous Q8H     PRN Meds:dextrose 50%, diphenhydrAMINE, glucagon (human recombinant), hydrocodone-acetaminophen 5-325mg, insulin aspart, morphine, ondansetron     No current facility-administered medications on file prior to encounter.      No current outpatient prescriptions on file prior to encounter.       Review of patient's allergies indicates:  No  Known Allergies    Past Medical History:   Diagnosis Date    Insulin dependent diabetes mellitus      No past surgical history on file.  Family History     None        Social History Main Topics    Smoking status: Not on file    Smokeless tobacco: Not on file    Alcohol use Not on file    Drug use: Unknown    Sexual activity: Not on file     Review of Systems  Objective:     Vital Signs (Most Recent):  Temp: 98.1 °F (36.7 °C) (08/30/17 1725)  Pulse: 87 (08/30/17 1815)  Resp: 19 (08/30/17 1815)  BP: 135/83 (08/30/17 1931)  SpO2: 96 % (08/30/17 1931) Vital Signs (24h Range):  Temp:  [98.1 °F (36.7 °C)] 98.1 °F (36.7 °C)  Pulse:  [87-90] 87  Resp:  [19-20] 19  SpO2:  [96 %-100 %] 96 %  BP: (135-151)/(83-94) 135/83     Weight: 65.8 kg (145 lb)  Body mass index is 20.22 kg/m².         Physical Exam    General: NAD; Cachectic in appearance. Ill-appearing, Head of bed raised  Neuro: AAOx3. CN II-XII intact.  Cardiovascular: normal rate  Respiratory: inspiratory stridor noted with tracheal tugging.  Voice:  hoarse  Head/Face: Cachectic; Negative sinus pressure/tenderness; Salivary glands WNL.  Eyes: EOMI; PERRLA   Right Ear: Auricle WNL. EAC WNL.      Left Ear: Auricle WNL. EAC WNL.   Nose: normal ext appearance and palpation. Normal septum, inferior turbinates, mucosa.   OC: Lips and gingiva wnl.  Good dentition. FOM Soft.  Anterior Tongue nml size and mobility; Hard Palate wnl  OP: BOT WNL. Soft palate wnl with Midline uvula.  Tonsils 2+ bilaterally. Posterior oropharynx patent, wnl  Neck/Lymphatic: Bulky mass noted at level of thyroid cartilage with indistinguishable thyroid cartilage. Cricoid palpable at inferior aspect.  Trachea midline. . Full ROM.  Nl.    Flexible Fiberoptic Laryngoscopy   Verbal consent obtained. Anesthesia with 4% lidocaine instilled to bilateral nares.  Nasal Cavity- normal   Nasopharynx   Adenoid tissue - normal    eustachian tube orifices - normal   Oropharynx   Posterior pharyngeal wall -  normal   Base of tongue - normal    Valleculae - normal  Hypopharynx   Pyriform sinuses - normal    Post-cricoid normal  Supraglottis   Epiglottis - normal    AE folds- right AE fold with edematous cyst filled area   Arytenoids - edematous   Interarytenoid space - normal   False vocal cords - normal   Glottis   True vocal cords - appear largely fixed in nature, but in paramedian position with patent airway  Subglottis: evidence of obstructive mass noted on right      Significant Labs:  ABGs:   Recent Labs  Lab 08/30/17  1812   PH 7.394   PCO2 60.7*   HCO3 37.1*   POCSATURATED 95   BE 12     CBC:   Recent Labs  Lab 08/30/17  1806   WBC 10.18   RBC 4.30*   HGB 13.2*   HCT 37.7*      MCV 88   MCH 30.7   MCHC 35.0     CMP:   Recent Labs  Lab 08/30/17 1806   GLU 78   CALCIUM 9.6   ALBUMIN 3.5   PROT 7.5      K 4.4   CO2 34*   CL 98   BUN 18   CREATININE 0.9   ALKPHOS 60   ALT 18   AST 19   BILITOT 0.6       Significant Diagnostics:  CT: I have reviewed all pertinent results/findings within the past 24 hours and my personal findings are:  OSH CT scan shows bulky laryngeal disease at level of glottis and primarily subglottis with thyroid cartilage involvement. Moderate to severe subglottic airway narrowing noted.    Assessment/Plan:     Laryngeal mass    Mr. Esteban is a 46 yo M with DM and HTN who presents as transfer from [facility] with stridor, hoarseness, and known laryngeal mass with CT evidence of bulky laryngeal disease very concerning for malignancy. No immediate airway risk as patient has had slowly progressive stridor and hoarseness for past 2 months. ABG with pCO2 of 60.7, pH 7.39. WBC 10.    - Admit to ICU under ENT team for airway observation  - Patient could potentially be intubated with small ETT if necessary o/n  - Continuous pulse ox/ telemetry  - NPO at midnight with MIVF  - Follow-up coags  - Awake trach in AM (likely 7AM)  - Will obtain new diagnostic biopsies  - POCT  glucose TID; insulin per home regiment  - Discussed with Dr. Deleon, who agrees with plan          VTE Risk Mitigation         Ordered     Low Risk of VTE  Once      08/30/17 4499          Thank you for your consult. I will follow-up with patient. Please contact us if you have any additional questions.    Tavo Dale MD  Otorhinolaryngology-Head & Neck Surgery  [facility]

## 2017-08-31 NOTE — H&P
History & Physical  Surgical Intensive Care    SUBJECTIVE:     Chief Complaint/Reason for Admission:     History of Present Illness:  Patient is a 47 y.o. male with a tracheal mass who presents from OSH with complaint of shortness of breath, dysphagia, and 25 lb weight loss over the past 2 months.  He saw a physician at an OSH at the onset of sxs who told him it was a benign mass, Symptoms have rapidly progressed over past week 2 weeks with decreased PO intake. ENT saw patient in ED and performed fiberoptic laryngoscopy which showed large, obstructing laryngeal tumor worrisome for malignancy.   He was admitted to the SICU overnight with plan for bedside trach justine;  no supplemental O2 requirements at this time, 95-99% on RA, HDS.     PTA Medications   Medication Sig    insulin aspart (NOVOLOG) 100 unit/mL injection Inject into the skin 3 (three) times daily before meals.    insulin glargine (LANTUS) 100 unit/mL injection Inject 20 Units into the skin every evening.       Review of patient's allergies indicates:  No Known Allergies    Past Medical History:   Diagnosis Date    Insulin dependent diabetes mellitus      No past surgical history on file.  No family history on file.  Social History   Substance Use Topics    Smoking status: Not on file    Smokeless tobacco: Not on file    Alcohol use Not on file        Review of Systems:  ROS  Negative except as above    OBJECTIVE:     Vital Signs (Most Recent)  Temp: 98.7 °F (37.1 °C) (08/30/17 2045)  Pulse: 87 (08/30/17 2200)  Resp: 19 (08/30/17 2200)  BP: (!) 167/79 (08/30/17 2200)  SpO2: 95 % (08/30/17 2200)    Physical Exam   NAD, cachetic in appearance  AAOx3  NCAT, MMM, hoarse voice, trachea midline, FROM neck flexion and extension  Moves all extremities  RLE: ulcer between 1st and 2nd toe    Lines/Drains:       Peripheral IV - Single Lumen 08/30/17 1806 Left Hand (Active)   Site Assessment Clean;Dry 8/30/2017  6:06 PM   Number of days: 0       Laboratory  CBC:    Recent Labs  Lab 08/30/17  1806   WBC 10.18   RBC 4.30*   HGB 13.2*   HCT 37.7*      MCV 88   MCH 30.7   MCHC 35.0     BMP:   Recent Labs  Lab 08/30/17  1806   GLU 78      K 4.4   CL 98   CO2 34*   BUN 18   CREATININE 0.9   CALCIUM 9.6     CMP:   Recent Labs  Lab 08/30/17  1806   GLU 78   CALCIUM 9.6   ALBUMIN 3.5   PROT 7.5      K 4.4   CO2 34*   CL 98   BUN 18   CREATININE 0.9   ALKPHOS 60   ALT 18   AST 19   BILITOT 0.6     ABGs:   Recent Labs  Lab 08/30/17  1812   PH 7.394   PCO2 60.7*   PO2 77*   HCO3 37.1*   POCSATURATED 95   BE 12       Chest X-Ray: No results found in the last 24 hours.      ASSESSMENT/PLAN:       Plan:  Neuro:   -Pain control: PRN percocet 5-325 mg and morphine 2 mg    Pulmonary:   - Good O2 saturation on RA  - monitor for impending airway    Cardiac:  -PRN hydralazine for SBP >165    Renal:   - urinating without difficulty    Fluids/Electrolytes/Nutrition/GI:   - regular diet, NPO at midnight  -replace lytes PRN  -1/2NS+ 20KCl @ 100 ml/hr     Hematology/Oncology:  -H/H- 13.2/37.7    Infectious Disease:   -Afebrile  -WBC 10.18    Endocrine:  -Glucose goal of 120-150  -on home insulin regimen: insulin aspart 0-5U q6h, insulin detemir 20U qhs    Dispo:  -Continue care in the ICU setting    Sabrina Smith  PGY-1

## 2017-08-31 NOTE — PROGRESS NOTES
Pt arrived from OR to room 6085.  Pt placed on BiPAP at documented settings.  Will continue to monitor.

## 2017-08-31 NOTE — PLAN OF CARE
Pt. remains in ICU . O2 per trach. Pt. Is sedated. Spoke with pt's sister Lisy @ [phone]. She provided information for assessment. Pt. was transferred from [facility]  and lives with mother in [place] She reports pt. Was independent and active prior to admission. No DME or HH. Plan is for pt. to return home with mother. Several sisters available to assist with pt's care. Pt.will need Trach supplies and HH services if insurance provide HH days.  SW/CM will continue to follow pt. and facilitate d/c needs as pt. becomes medically stable for d/c.  REY Cabrera RN/CM    Payor: [place] MEDICAID / Plan: [place] MEDICAID [facility] / Product Type: Managed Medicaid /      Primary Doctor No     No Pharmacies Listed     Extended Emergency Contact Information  Primary Emergency Contact: Lisy Grant   [facility]  Home Phone: [phone]  Relation: Sister        08/31/17 1035   Discharge Assessment   Assessment Type Discharge Planning Assessment   Confirmed/corrected address and phone number on facesheet? Yes   Assessment information obtained from? Caregiver  (spoke with sister Lisy - she provided information for assessment)   Expected Length of Stay (days) 6   Prior to hospitilization cognitive status: Alert/Oriented   Prior to hospitalization functional status: Independent   Current cognitive status: Coma/Sedated/Intubated   Current Functional Status: Needs Assistance   Lives With parent(s)  (lives with mother)   Able to Return to Prior Arrangements yes   Is patient able to care for self after discharge? Yes  (with assistance from mother and sisters)   Who are your caregiver(s) and their phone number(s)? Lisy grady [phone]   Patient's perception of discharge disposition home or selfcare   Readmission Within The Last 30 Days no previous admission in last 30 days   Patient currently being followed by outpatient case management? No   Patient currently receives any  other outside agency services? No   Equipment Currently Used at Home none   Do you have any problems affording any of your prescribed medications? No   Is the patient taking medications as prescribed? yes   Does the patient have transportation home? Yes   Transportation Available family or friend will provide   Does the patient receive services at the [facility]mad[facility] Clinic? No   Discharge Plan A Home;Home Health   Discharge Plan B Home;Home with family   Patient/Family In Agreement With Plan yes

## 2017-08-31 NOTE — SUBJECTIVE & OBJECTIVE
Medications:  Continuous Infusions:   0.45 % NaCl with KCl 20 mEq       Scheduled Meds:   insulin detemir  20 Units Subcutaneous QHS    sodium chloride 0.9%  3 mL Intravenous Q8H     PRN Meds:dextrose 50%, diphenhydrAMINE, glucagon (human recombinant), hydrocodone-acetaminophen 5-325mg, insulin aspart, morphine, ondansetron     No current facility-administered medications on file prior to encounter.      No current outpatient prescriptions on file prior to encounter.       Review of patient's allergies indicates:  No Known Allergies    Past Medical History:   Diagnosis Date    Insulin dependent diabetes mellitus      No past surgical history on file.  Family History     None        Social History Main Topics    Smoking status: Not on file    Smokeless tobacco: Not on file    Alcohol use Not on file    Drug use: Unknown    Sexual activity: Not on file     Review of Systems  Objective:     Vital Signs (Most Recent):  Temp: 98.1 °F (36.7 °C) (08/30/17 1725)  Pulse: 87 (08/30/17 1815)  Resp: 19 (08/30/17 1815)  BP: 135/83 (08/30/17 1931)  SpO2: 96 % (08/30/17 1931) Vital Signs (24h Range):  Temp:  [98.1 °F (36.7 °C)] 98.1 °F (36.7 °C)  Pulse:  [87-90] 87  Resp:  [19-20] 19  SpO2:  [96 %-100 %] 96 %  BP: (135-151)/(83-94) 135/83     Weight: 65.8 kg (145 lb)  Body mass index is 20.22 kg/m².         Physical Exam    General: NAD; Cachectic in appearance. Ill-appearing, Head of bed raised  Neuro: AAOx3. CN II-XII intact.  Cardiovascular: normal rate  Respiratory: inspiratory stridor noted with tracheal tugging.  Voice:  hoarse  Head/Face: Cachectic; Negative sinus pressure/tenderness; Salivary glands WNL.  Eyes: EOMI; PERRLA   Right Ear: Auricle WNL. EAC WNL.      Left Ear: Auricle WNL. EAC WNL.   Nose: normal ext appearance and palpation. Normal septum, inferior turbinates, mucosa.   OC: Lips and gingiva wnl.  Good dentition. FOM Soft.  Anterior Tongue nml size and mobility; Hard Palate wnl  OP: BOT WNL. Soft  palate wnl with Midline uvula.  Tonsils 2+ bilaterally. Posterior oropharynx patent, wnl  Neck/Lymphatic: Bulky mass noted at level of thyroid cartilage with indistinguishable thyroid cartilage. Cricoid palpable at inferior aspect.  Trachea midline. . Full ROM.  nl.  Significant Labs:  ABGs:   Recent Labs  Lab 08/30/17  1812   PH 7.394   PCO2 60.7*   HCO3 37.1*   POCSATURATED 95   BE 12     CBC:   Recent Labs  Lab 08/30/17  1806   WBC 10.18   RBC 4.30*   HGB 13.2*   HCT 37.7*      MCV 88   MCH 30.7   MCHC 35.0     CMP:   Recent Labs  Lab 08/30/17  1806   GLU 78   CALCIUM 9.6   ALBUMIN 3.5   PROT 7.5      K 4.4   CO2 34*   CL 98   BUN 18   CREATININE 0.9   ALKPHOS 60   ALT 18   AST 19   BILITOT 0.6       Significant Diagnostics:  CT: I have reviewed all pertinent results/findings within the past 24 hours and my personal findings are:  OSH CT scan shows bulky laryngeal disease at level of glottis and primarily subglottis with thyroid cartilage involvement. Moderate subglottic airway narrowing noted.

## 2017-08-31 NOTE — ASSESSMENT & PLAN NOTE
Mr. Esteban is a 48 yo M with DM and HTN who presents as transfer from [facility] with stridor, hoarseness, and known laryngeal mass with CT evidence of bulky laryngeal disease very concerning for malignancy. No immediate airway risk as patient has had slowly progressive stridor and hoarseness for past 2 months. ABG with pCO2 of 60.7, pH 7.39. WBC 10.    - Admit to ICU under ENT team for airway observation  - Patient could potentially be intubated with small ETT if necessary o/n  - Continuous pulse ox/ telemetry  - NPO at midnight with MIVF  - Follow-up coags  - Awake trach in AM (likely 7AM)  - Will obtain new diagnostic biopsies  - POCT glucose TID; insulin per home regiment  - Discussed with Dr. Deleon, who agrees with plan

## 2017-08-31 NOTE — PROCEDURES
DATE OF PROCEDURE: 08/31/2017    PREOPERATIVE DIAGNOSES:   1. Laryngeal mass  2. Dysphagia.    POSTOPERATIVE DIAGNOSES:   1. Laryngeal mass  2. Dysphagia.     PROCEDURES PERFORMED: Esophagogastroduodenoscopy with percutaneous endoscopic gastrostomy.    ATTENDING SURGEON: Joshua Goldberg, M.D.     HOUSESTAFF SURGEON: Leonarda Rod MD    : Sudhir Collins MD    ANESTHESIA: General endotracheal.     ESTIMATED BLOOD LOSS: 5 mL     FINDINGS: A 20-Hungarian percutaneous gastrostomy placed without apparent complication.     SPECIMEN: None.     DRAINS: None.     COMPLICATIONS: None.     INDICATIONS: Luís Esteban is a 47 y.o.male admitted to [facility] with laryngeal mass. The patient had tracheostomy placed this morning per ENT. We recommend a percutaneous gastrostomy tube for the patient's dysphagia. We did obtain informed consent from the patient's family who expressed understanding of the risks and benefits and gave consent to proceed.     OPERATIVE PROCEDURE: The procedure was performed in the bedside at the Intensive Care Unit. The patient was identified and monitored throughout. The patient's abdomen was prepped and draped. An upper endoscope was introduced into the oropharynx and guided down into the esophagus and stomach. The stomach was insufflated with air. We intubated the duodenum and examined the first and second portions; no abnormalities were noted. We withdrew the endoscope into the stomach and identified an appropriate position for gastrostomy tube placement 2 finger-breadths below the left subcostal margin. Palpation of the anterior abdominal wall at this point was visualized endoscopically and transillumination from the endoscope was visualized through the anterior abdominal wall. We made a 1.5 cm transverse skin incision. At this point, the stomach was cannulated with a catheter loaded on a needle. This was grasped by a snare which had been passed through the endoscope.  The needle was removed, and a guidewire was placed, and the snare was used to grasp the guidewire. The endoscope, snare, and guidewire were all withdrawn from the patient's mouth. A 20-Italian gastrostomy tube was loaded onto the guidewire and pulled through the anterior abdominal wall via Seldinger technique. Repeat endoscopy was performed with the gastrostomy tube at the 2.5 cm tejas at the skin. There was no blanching of the gastric mucosa, and when the tube was twisted, the button did not grab the mucosa. The insufflation in the stomach was evacuated, and the endoscope was removed. The gastrostomy tube was secured in place using the supplied devices and connected to a bag for gravity drainage.    The patient remained in critical but stable condition in the ICU at the end of the case.     Leonarda Rod MD  PGY-1 General Surgery  [facility]    ATTENDING ATTESTATION: I was present and either scrubbed for or directed the entire procedure.

## 2017-08-31 NOTE — SUBJECTIVE & OBJECTIVE
Interval History: NAEON. No desats.    Medications:  Continuous Infusions:   sodium chloride 0.9%       Scheduled Meds:   insulin detemir  20 Units Subcutaneous QHS    sodium chloride 0.9%  3 mL Intravenous Q8H     PRN Meds:dextrose 50%, diphenhydrAMINE, glucagon (human recombinant), hydrALAZINE, hydrocodone-acetaminophen 5-325mg, insulin aspart, morphine, ondansetron     Review of patient's allergies indicates:  No Known Allergies  Objective:     Vital Signs (24h Range):  Temp:  [98.1 °F (36.7 °C)-98.7 °F (37.1 °C)] 98.5 °F (36.9 °C)  Pulse:  [] 78  Resp:  [0-49] 39  SpO2:  [92 %-100 %] 99 %  BP: (132-179)/(74-98) 179/86        Lines/Drains/Airways     Peripheral Intravenous Line                 Peripheral IV - Single Lumen 08/30/17 2100 Left Wrist less than 1 day                Physical Exam  Resting in bed on room air  Inspiratory stridor, working hard to breath this morning, but satting high 90's  Neck: palpable landmarks    Significant Labs:  All pertinent labs from the last 24 hours have been reviewed.    Significant Diagnostics:  None

## 2017-08-31 NOTE — PROGRESS NOTES
Skin Note:   Foot ulcer noted to RLE, in between first and second toe. Appears to be dry, crusty in appearance. See flowsheet for details.

## 2017-08-31 NOTE — PLAN OF CARE
Problem: Patient Care Overview  Goal: Plan of Care Review  Outcome: Ongoing (interventions implemented as appropriate)  VSS. Pt transferred to OR at 0700 to have tracheostomy placed. VSS upon return to the unit. Pt had PEG placed at bedside, tolerated well, gravity bag. Given versed and fentanyl during procedure. Pt bagged during procedure, sats kept > 90 throughout procedure. Placed back on AC at 40% PEEP 5. Pt anf families questions and concerns addressed. Plan of care reviewed. Will continue to monitor.     Problem: Diabetes, Type 2 (Adult)  Goal: Signs and Symptoms of Listed Potential Problems Will be Absent, Minimized or Managed (Diabetes, Type 2)  Signs and symptoms of listed potential problems will be absent, minimized or managed by discharge/transition of care (reference Diabetes, Type 2 (Adult) CPG).   Outcome: Ongoing (interventions implemented as appropriate)  .    Problem: Skin Integrity Impairment, Risk/Actual (Adult)  Goal: Identify Related Risk Factors and Signs and Symptoms  Related risk factors and signs and symptoms are identified upon initiation of Human Response Clinical Practice Guideline (CPG)   Outcome: Ongoing (interventions implemented as appropriate)  .    Problem: Breathing Pattern Ineffective (Adult)  Goal: Identify Related Risk Factors and Signs and Symptoms  Related risk factors and signs and symptoms are identified upon initiation of Human Response Clinical Practice Guideline (CPG)   Outcome: Ongoing (interventions implemented as appropriate)  .    Problem: Fall Risk (Adult)  Goal: Identify Related Risk Factors and Signs and Symptoms  Related risk factors and signs and symptoms are identified upon initiation of Human Response Clinical Practice Guideline (CPG)   Outcome: Ongoing (interventions implemented as appropriate)  .    Problem: Pain, Acute (Adult)  Goal: Identify Related Risk Factors and Signs and Symptoms  Related risk factors and signs and symptoms are identified upon  initiation of Human Response Clinical Practice Guideline (CPG)   Outcome: Ongoing (interventions implemented as appropriate)  .    Problem: Airway, Artificial (Adult)  Goal: Signs and Symptoms of Listed Potential Problems Will be Absent, Minimized or Managed (Airway, Artificial)  Signs and symptoms of listed potential problems will be absent, minimized or managed by discharge/transition of care (reference Airway, Artificial (Adult) CPG).   Outcome: Ongoing (interventions implemented as appropriate)  .    Problem: Pressure Ulcer Risk (Nehemiah Scale) (Adult,Obstetrics,Pediatric)  Goal: Identify Related Risk Factors and Signs and Symptoms  Related risk factors and signs and symptoms are identified upon initiation of Human Response Clinical Practice Guideline (CPG)   Outcome: Ongoing (interventions implemented as appropriate)  .

## 2017-08-31 NOTE — HPI
Mr. Esteban is a 48 yo M with DM and HTN who presents as transfer from [facility] with stridor, hoarseness, and known laryngeal mass. Patient reports SOB, orthopnea, and hoarseness for past 2 months. He also reports about 2 months ago he was seen by ENT who performed biopsy of vocal cord mass showing benign lesion. Patient reports he was supposed to follow-up, but hasn't been able to recently. Endorses 20 pound weight loss in past 2-3 months. Also reports some difficulty eating certain foods, but no issue otherwise with swallowing. Patient was smoker, but quit 16 years ago. Denies recent fevers or chills. Sats stable in ED during interview. CT scan on disc from OSH today shows laryngeal mass with airway narrowing.

## 2017-09-01 PROBLEM — C32.9 SQUAMOUS CELL CARCINOMA OF LARYNX: Status: ACTIVE | Noted: 2017-09-01

## 2017-09-01 PROBLEM — J38.7 LARYNGEAL MASS: Status: RESOLVED | Noted: 2017-08-31 | Resolved: 2017-09-01

## 2017-09-01 LAB
ANION GAP SERPL CALC-SCNC: 9 MMOL/L
APTT BLDCRRT: 21.8 SEC
BASOPHILS # BLD AUTO: 0 K/UL
BASOPHILS NFR BLD: 0 %
BUN SERPL-MCNC: 17 MG/DL
CALCIUM SERPL-MCNC: 7.6 MG/DL
CHLORIDE SERPL-SCNC: 104 MMOL/L
CO2 SERPL-SCNC: 27 MMOL/L
CREAT SERPL-MCNC: 0.7 MG/DL
DIFFERENTIAL METHOD: ABNORMAL
EOSINOPHIL # BLD AUTO: 0 K/UL
EOSINOPHIL NFR BLD: 0.1 %
ERYTHROCYTE [DISTWIDTH] IN BLOOD BY AUTOMATED COUNT: 16.2 %
EST. GFR  (AFRICAN AMERICAN): >60 ML/MIN/1.73 M^2
EST. GFR  (NON AFRICAN AMERICAN): >60 ML/MIN/1.73 M^2
GLUCOSE SERPL-MCNC: 62 MG/DL
HCT VFR BLD AUTO: 34.4 %
HGB BLD-MCNC: 12.2 G/DL
INR PPP: 1
LYMPHOCYTES # BLD AUTO: 0.6 K/UL
LYMPHOCYTES NFR BLD: 5.6 %
MAGNESIUM SERPL-MCNC: 1.3 MG/DL
MCH RBC QN AUTO: 32.1 PG
MCHC RBC AUTO-ENTMCNC: 35.5 G/DL
MCV RBC AUTO: 91 FL
MONOCYTES # BLD AUTO: 0.5 K/UL
MONOCYTES NFR BLD: 4.2 %
NEUTROPHILS # BLD AUTO: 9.7 K/UL
NEUTROPHILS NFR BLD: 90.1 %
PHOSPHATE SERPL-MCNC: 3 MG/DL
PLATELET # BLD AUTO: 222 K/UL
PMV BLD AUTO: 9.6 FL
POCT GLUCOSE: 111 MG/DL (ref 70–110)
POCT GLUCOSE: 130 MG/DL (ref 70–110)
POCT GLUCOSE: 161 MG/DL (ref 70–110)
POCT GLUCOSE: 45 MG/DL (ref 70–110)
POCT GLUCOSE: 49 MG/DL (ref 70–110)
POCT GLUCOSE: 62 MG/DL (ref 70–110)
POCT GLUCOSE: 64 MG/DL (ref 70–110)
POCT GLUCOSE: 71 MG/DL (ref 70–110)
POTASSIUM SERPL-SCNC: 3.7 MMOL/L
PROTHROMBIN TIME: 10.8 SEC
RBC # BLD AUTO: 3.8 M/UL
SODIUM SERPL-SCNC: 140 MMOL/L
WBC # BLD AUTO: 10.76 K/UL

## 2017-09-01 PROCEDURE — 99900035 HC TECH TIME PER 15 MIN (STAT)

## 2017-09-01 PROCEDURE — 87070 CULTURE OTHR SPECIMN AEROBIC: CPT

## 2017-09-01 PROCEDURE — 63600175 PHARM REV CODE 636 W HCPCS: Performed by: STUDENT IN AN ORGANIZED HEALTH CARE EDUCATION/TRAINING PROGRAM

## 2017-09-01 PROCEDURE — 25000003 PHARM REV CODE 250: Performed by: STUDENT IN AN ORGANIZED HEALTH CARE EDUCATION/TRAINING PROGRAM

## 2017-09-01 PROCEDURE — 87186 SC STD MICRODIL/AGAR DIL: CPT

## 2017-09-01 PROCEDURE — 80048 BASIC METABOLIC PNL TOTAL CA: CPT

## 2017-09-01 PROCEDURE — 92611 MOTION FLUOROSCOPY/SWALLOW: CPT

## 2017-09-01 PROCEDURE — 87086 URINE CULTURE/COLONY COUNT: CPT

## 2017-09-01 PROCEDURE — 97802 MEDICAL NUTRITION INDIV IN: CPT

## 2017-09-01 PROCEDURE — S5010 5% DEXTROSE AND 0.45% SALINE: HCPCS | Performed by: STUDENT IN AN ORGANIZED HEALTH CARE EDUCATION/TRAINING PROGRAM

## 2017-09-01 PROCEDURE — 87040 BLOOD CULTURE FOR BACTERIA: CPT | Mod: 59

## 2017-09-01 PROCEDURE — 99232 SBSQ HOSP IP/OBS MODERATE 35: CPT | Mod: ,,, | Performed by: RADIOLOGY

## 2017-09-01 PROCEDURE — 27000221 HC OXYGEN, UP TO 24 HOURS

## 2017-09-01 PROCEDURE — A4216 STERILE WATER/SALINE, 10 ML: HCPCS | Performed by: STUDENT IN AN ORGANIZED HEALTH CARE EDUCATION/TRAINING PROGRAM

## 2017-09-01 PROCEDURE — 87205 SMEAR GRAM STAIN: CPT

## 2017-09-01 PROCEDURE — 87077 CULTURE AEROBIC IDENTIFY: CPT

## 2017-09-01 PROCEDURE — 94761 N-INVAS EAR/PLS OXIMETRY MLT: CPT

## 2017-09-01 PROCEDURE — 85025 COMPLETE CBC W/AUTO DIFF WBC: CPT

## 2017-09-01 PROCEDURE — 63600175 PHARM REV CODE 636 W HCPCS

## 2017-09-01 PROCEDURE — 99223 1ST HOSP IP/OBS HIGH 75: CPT | Mod: ,,, | Performed by: INTERNAL MEDICINE

## 2017-09-01 PROCEDURE — 85610 PROTHROMBIN TIME: CPT

## 2017-09-01 PROCEDURE — 99233 SBSQ HOSP IP/OBS HIGH 50: CPT | Mod: 24,,, | Performed by: SURGERY

## 2017-09-01 PROCEDURE — 20000000 HC ICU ROOM

## 2017-09-01 PROCEDURE — 83735 ASSAY OF MAGNESIUM: CPT

## 2017-09-01 PROCEDURE — 99900026 HC AIRWAY MAINTENANCE (STAT)

## 2017-09-01 PROCEDURE — 85730 THROMBOPLASTIN TIME PARTIAL: CPT

## 2017-09-01 PROCEDURE — 84100 ASSAY OF PHOSPHORUS: CPT

## 2017-09-01 PROCEDURE — 63600175 PHARM REV CODE 636 W HCPCS: Performed by: ANESTHESIOLOGY

## 2017-09-01 RX ORDER — OXYCODONE HCL 5 MG/5 ML
10 SOLUTION, ORAL ORAL
Status: DISCONTINUED | OUTPATIENT
Start: 2017-09-01 | End: 2017-09-07 | Stop reason: HOSPADM

## 2017-09-01 RX ORDER — IBUPROFEN 200 MG
16 TABLET ORAL
Status: DISCONTINUED | OUTPATIENT
Start: 2017-09-01 | End: 2017-09-03

## 2017-09-01 RX ORDER — HYDROMORPHONE HYDROCHLORIDE 1 MG/ML
0.5 INJECTION, SOLUTION INTRAMUSCULAR; INTRAVENOUS; SUBCUTANEOUS
Status: DISCONTINUED | OUTPATIENT
Start: 2017-09-01 | End: 2017-09-07 | Stop reason: HOSPADM

## 2017-09-01 RX ORDER — INSULIN ASPART 100 [IU]/ML
0-5 INJECTION, SOLUTION INTRAVENOUS; SUBCUTANEOUS
Status: DISCONTINUED | OUTPATIENT
Start: 2017-09-01 | End: 2017-09-02

## 2017-09-01 RX ORDER — HYDROCODONE BITARTRATE AND ACETAMINOPHEN 7.5; 325 MG/15ML; MG/15ML
10 SOLUTION ORAL EVERY 4 HOURS PRN
Status: DISCONTINUED | OUTPATIENT
Start: 2017-09-01 | End: 2017-09-01

## 2017-09-01 RX ORDER — IBUPROFEN 200 MG
24 TABLET ORAL
Status: DISCONTINUED | OUTPATIENT
Start: 2017-09-01 | End: 2017-09-03

## 2017-09-01 RX ORDER — ACETAMINOPHEN 10 MG/ML
1000 INJECTION, SOLUTION INTRAVENOUS ONCE
Status: COMPLETED | OUTPATIENT
Start: 2017-09-01 | End: 2017-09-01

## 2017-09-01 RX ORDER — GLUCAGON 1 MG
1 KIT INJECTION
Status: DISCONTINUED | OUTPATIENT
Start: 2017-09-01 | End: 2017-09-02

## 2017-09-01 RX ORDER — DEXTROSE MONOHYDRATE AND SODIUM CHLORIDE 5; .45 G/100ML; G/100ML
INJECTION, SOLUTION INTRAVENOUS CONTINUOUS
Status: DISCONTINUED | OUTPATIENT
Start: 2017-09-01 | End: 2017-09-02

## 2017-09-01 RX ORDER — OXYCODONE HCL 5 MG/5 ML
5 SOLUTION, ORAL ORAL
Status: DISCONTINUED | OUTPATIENT
Start: 2017-09-01 | End: 2017-09-07 | Stop reason: HOSPADM

## 2017-09-01 RX ORDER — OXYCODONE HCL 5 MG/5 ML
5 SOLUTION, ORAL ORAL
Status: DISCONTINUED | OUTPATIENT
Start: 2017-09-01 | End: 2017-09-01

## 2017-09-01 RX ADMIN — DEXTROSE MONOHYDRATE AND SODIUM CHLORIDE: 5; .45 INJECTION, SOLUTION INTRAVENOUS at 08:09

## 2017-09-01 RX ADMIN — Medication 3 ML: at 02:09

## 2017-09-01 RX ADMIN — ENOXAPARIN SODIUM 40 MG: 100 INJECTION SUBCUTANEOUS at 06:09

## 2017-09-01 RX ADMIN — INSULIN ASPART 2 UNITS: 100 INJECTION, SOLUTION INTRAVENOUS; SUBCUTANEOUS at 12:09

## 2017-09-01 RX ADMIN — DEXTROSE MONOHYDRATE 12.5 G: 25 INJECTION, SOLUTION INTRAVENOUS at 05:09

## 2017-09-01 RX ADMIN — MAGNESIUM SULFATE HEPTAHYDRATE 3 G: 500 INJECTION, SOLUTION INTRAMUSCULAR; INTRAVENOUS at 10:09

## 2017-09-01 RX ADMIN — OXYCODONE HYDROCHLORIDE 10 MG: 5 SOLUTION ORAL at 08:09

## 2017-09-01 RX ADMIN — ACETAMINOPHEN 1000 MG: 10 INJECTION, SOLUTION INTRAVENOUS at 08:09

## 2017-09-01 RX ADMIN — DEXTROSE MONOHYDRATE AND SODIUM CHLORIDE: 5; .45 INJECTION, SOLUTION INTRAVENOUS at 05:09

## 2017-09-01 RX ADMIN — HYDROMORPHONE HYDROCHLORIDE 0.5 MG: 1 INJECTION, SOLUTION INTRAMUSCULAR; INTRAVENOUS; SUBCUTANEOUS at 08:09

## 2017-09-01 RX ADMIN — INSULIN ASPART 3 UNITS: 100 INJECTION, SOLUTION INTRAVENOUS; SUBCUTANEOUS at 06:09

## 2017-09-01 RX ADMIN — INSULIN ASPART 1 UNITS: 100 INJECTION, SOLUTION INTRAVENOUS; SUBCUTANEOUS at 11:09

## 2017-09-01 RX ADMIN — Medication 3 ML: at 05:09

## 2017-09-01 RX ADMIN — Medication 3 ML: at 09:09

## 2017-09-01 NOTE — PLAN OF CARE
Problem: Patient Care Overview  Goal: Plan of Care Review  Recommendations     Recommendation/Intervention: Consult received for TF recs.   1. Impact Peptide 1.5's protein concentration is excessive in meeting this pt's EEN & EPN. For continuous TF, rec Isosource 1.5 w/ a goal rate of 55ml/hr = 1980 cals, 89 gms prot (1.45 gms/kg), & 1026 mls free fl.  For bolus recs, 330mls QID.    2. For free water flushes, consider 240mls QID; this would provide a total of 1986 mls daily b/w free water in TF & flushes.    Goals: TF to meet b/w % EEN/EPN  Nutrition Goal Status: new  Communication of RD Recs: reviewed with RN

## 2017-09-01 NOTE — PROGRESS NOTES
Pt seen and examined s/p peg tube placement at bedside yesterday  NAEON  Mild bilious gtube output  No surrounding erythema  No obvious complications  Doing well  Can begin tube feeds at noon today  Will sign off at this time, please call with any questions    Sudhir Collins MD PGY III  [phone]

## 2017-09-01 NOTE — CONSULTS
[facility]  Radiation Oncology  Consult Note    Patient Name: Luís Esteban  MRN: [id number]  Admission Date: 8/30/2017  Hospital Length of Stay: 2 days  Code Status: Full Code   Attending Provider: Mk Deleon MD  Consulting Provider: Marlon Guerra MD  Primary Care Physician: Primary Doctor No  Principal Problem:Laryngeal mass    Inpatient consult to Radiation Oncology  Consult performed by: MARLON GUERRA.  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     OTHER MEDICAL HISTORY: Patient quit smoking about 16 years ago. He is treated or followed for insulin dependent diabetes. PS is ECOG 2.     PRESENT ILLNESS: Patient was transferred from [facility] in [facility] to [facility] on 8/30/17 with a history of increasing dyspnea and stridor in the past few days. He reportedly had a biopsy of a laryngeal mass a few months ago without a definite diagnosis. On 8/31/17 he underwent an awake tracheostomy followed by intubation and general anesthesia for direct laryngoscopy exam and biopsy. The exam reports a mass occupying one side of the endolarynx and complete obstruction of the glottis. Frozen section biopsy reports a squamous cell carcinoma. Final path is pending.     PHYSICAL EXAM: Patient is seen in the surgical ICU breathing through the tracheostomy with supplemental oxygen. The respirations are with normal effort and without stridor. He has a near full set of natural teeth. There is no cervical or supraclavicular lymphadenopathy. There are no evident neurologic deficits.     RADIOLOGIC STUDIES: CT of the neck without contrast from the [facility] facility shows a mass destroying the right side of the larynx and bowing into the glottic airway to reduce it to  a thin slit that is concave to the right. It extends to the right to destroy the cartilage and into the strap muscles. It extends inferiorly to invade the right lobe of the thyroid. There is a 2 cm mass compressing or obstructing the right jugular vein at the subglottic level. It may be a thrombus in the vein, a 2 cm lymph node or tumor in the right thyroid lobe. I see no other enlarged lymph nodes.     Chest CT on 8/31/17 post operatively shows the trach tube, notes lung changes suggestive of aspiration and a 3 mm nodule in the in the right upper lobe of lung.     LABORATORY STUDIES: On 9/1/17 the Hb is 12.2 with a wbc of 10,760 and a platelet count of 222,000. Basic metabolic panel is unremarkable.     IMPRESSION: Additional imaging studies may reveal metastatic sherwin or other disease. However at present the stage appears to be T4a N0 M0. If surgical resection is elected post operative radiation treatment is recommended. If the cancer is not resected then treatment with radiation and concurrent chemotherapy is recommended.     PLAN: Patient likely can have definitive or post operative adjuvant radiation treatment at [facility] in [place] if he wishes. We will await developments.         No new subjective & objective note has been filed under this hospital service since the last note was generated.    Assessment/Plan:     Decision on laryngectomy pending. Radiation treatment either as adjuvant to the laryngectomy of as definitive treatment with concurrent chemotherapy is anticipated. If he wishes he could likely have radiation treatment at [facility] in [place].     Thank you for your consult.     Marlon Iyer MD  Radiation Oncology  [facility]

## 2017-09-01 NOTE — PLAN OF CARE
Patient needs trach supplies and tube feeds for home use, patient has [place] Medicaid, will send referrals to [facility] and [facility] once orders are received, will follow.

## 2017-09-01 NOTE — HPI
Luís Esteban is a 47 y.o. male with a tracheal mass who presents from OSH with complaint of shortness of breath, dysphagia, and 25 lb weight loss over the past 2 months.  He saw a physician at an OSH at the onset of sxs who told him it was a benign mass, Symptoms have rapidly progressed over past week 2 weeks with decreased PO intake. ENT saw patient in ED and performed fiberoptic laryngoscopy which showed large, obstructing laryngeal tumor worrisome for malignancy.   He was admitted to the SICU overnight with plan for bedside trach justine;  no supplemental O2 requirements at this time, 95-99% on RA, HDS.     Hematology/Oncology has been consulted for evaluation of [id number] invasive squamous cell carcinoma of the larynx. Since admission, patient had tracheostomy on 8/30 and PEG tube placed on 8/31. On today's interview, patient nodded his head yes or no to questions. He denied any pain or SOB. Biopsy of the mass was performed yesterday 8/31.

## 2017-09-01 NOTE — PROGRESS NOTES
Pt on trach collar, 40%. Tolerating well. Pt opens eyes spontaneously. Following commands. Nods/gestures appropriately. Moving all extremities freely/equally. Denies pain at this time. Plan of care reviewed with pt and family. Questions answered per ICU RN. See flowsheet for details.

## 2017-09-01 NOTE — SUBJECTIVE & OBJECTIVE
Interval History: NAEON s/p trach (ENT) and PEG (GenSurg); no resp issues    Medications:  Continuous Infusions:   dextrose 5 % and 0.45 % NaCl 100 mL/hr at 09/01/17 0530     Scheduled Meds:   ceFAZolin (ANCEF) IVPB  2 g Intravenous 30 Min Pre-Op    dexamethasone  8 mg Intravenous 30 Min Pre-Op    enoxaparin  40 mg Subcutaneous Daily    insulin detemir  20 Units Subcutaneous QHS    magnesium sulfate IVPB  3 g Intravenous Once    sodium chloride 0.9%  3 mL Intravenous Q8H     PRN Meds:dextrose 50%, diphenhydrAMINE, fentaNYL, glucagon (human recombinant), hydrALAZINE, hydrocodone-acetaminophen 5-325mg, HYDROmorphone, insulin aspart, midazolam (PF), morphine, ondansetron, rocuronium     Review of patient's allergies indicates:  No Known Allergies  Objective:     Vital Signs (24h Range):  Temp:  [97.5 °F (36.4 °C)-99.6 °F (37.6 °C)] 99.6 °F (37.6 °C)  Pulse:  [] 99  Resp:  [12-48] 48  SpO2:  [85 %-100 %] 97 %  BP: ()/(60-98) 130/77        Lines/Drains/Airways     Drain                 Gastrostomy/Enterostomy 08/31/17 1900 less than 1 day          Airway                 Surgical Airway 08/31/17 0734 Shiley Cuffed 1 day          Peripheral Intravenous Line                 Peripheral IV - Single Lumen 08/30/17 2100 Left Wrist 1 day         Peripheral IV - Single Lumen 08/31/17 1100 Right Forearm less than 1 day                Physical Exam  NAD  No resp distress on humidified 02 via trach collar  6.0 DCT (deflated) sutured in place and secured with trach tie  Neck soft    Significant Labs:    CBC    Recent Labs  Lab 08/30/17  1806 08/31/17  0421 09/01/17  0310   WBC 10.18 16.50* 10.76   HGB 13.2* 12.2* 12.2*   HCT 37.7* 35.3* 34.4*   MCV 88 90 91    282 222     BMP    Recent Labs  Lab 08/30/17  1806 08/31/17  0317 09/01/17  0431   GLU 78 313* 62*    133* 140   K 4.4 5.2* 3.7   CL 98 97 104   CO2 34* 23 27   BUN 18 19 17   CREATININE 0.9 1.2 0.7   CALCIUM 9.6 9.6 7.6*   PHOS  --  3.3 3.0   MG   --  2.2 1.3*     COAGS    Recent Labs  Lab 08/30/17  1806 08/31/17  0421 09/01/17  0310   INR 0.9 1.0 1.0         Significant Diagnostics:  CT chest 8/31/17:   Subsegmental ground glass opacities and scattered nodules within the lung bases and right middle lobe most concerning for aspiration given retained secretions within the right bronchus intermedius and segmental and subsegmental bronchi.    Right upper lobe pulmonary micronodule.  Continued followup is recommended with timing to be determined by clinical considerations.    Tracheostomy tube with scattered gas within the supraclavicular regions and upper mediastinum.

## 2017-09-01 NOTE — CONSULTS
[facility]  Hematology/Oncology  Consult Note    Patient Name: Luís Esteban  MRN: [id number]  Admission Date: 8/30/2017  Hospital Length of Stay: 2 days  Code Status: Full Code   Attending Provider: Mk Deleon MD  Consulting Provider: Ravindra Ferrer MD  Primary Care Physician: Primary Doctor No  Principal Problem:Laryngeal mass    Inpatient consult to Hematology/Oncology  Consult performed by: RAVINDRA FERRER  Consult ordered by: MICAH GOLDEN  Reason for consult: [id number] invasive squamous cell carcinoma of the larynx        Subjective:     HPI:  Luís Esteban is a 47 y.o. male with a tracheal mass who presents from OSH with complaint of shortness of breath, dysphagia, and 25 lb weight loss over the past 2 months.  He saw a physician at an OSH at the onset of sxs who told him it was a benign mass, Symptoms have rapidly progressed over past week 2 weeks with decreased PO intake. ENT saw patient in ED and performed fiberoptic laryngoscopy which showed large, obstructing laryngeal tumor worrisome for malignancy.   He was admitted to the SICU overnight with plan for bedside trach justine;  no supplemental O2 requirements at this time, 95-99% on RA, HDS.     Hematology/Oncology has been consulted for evaluation of [id number] invasive squamous cell carcinoma of the larynx. Since admission, patient had tracheostomy on 8/30 and PEG tube placed on 8/31. On today's interview, patient nodded his head yes or no to questions. He denied any pain or SOB. Biopsy of the mass was performed yesterday 8/31.       Medications:  Continuous Infusions:   dextrose 5 % and 0.45 % NaCl 100 mL/hr at 09/01/17 0700     Scheduled Meds:   enoxaparin  40 mg Subcutaneous Daily    magnesium sulfate IVPB  3 g Intravenous Once    sodium chloride 0.9%  3 mL Intravenous Q8H     PRN Meds:dextrose 50%, dextrose 50%, diphenhydrAMINE, fentaNYL, glucagon (human recombinant), glucose, glucose, hydrALAZINE, hydrocodone-apap  7.5-325 MG/15 ML, HYDROmorphone, insulin aspart, midazolam (PF), morphine, ondansetron, rocuronium     Review of patient's allergies indicates:  No Known Allergies     Past Medical History:   Diagnosis Date    Insulin dependent diabetes mellitus      History reviewed. No pertinent surgical history.  Family History     None        Social History Main Topics    Smoking status: Not on file    Smokeless tobacco: Not on file    Alcohol use Not on file    Drug use: Unknown    Sexual activity: Not on file       Review of Systems   Constitutional: Positive for activity change, appetite change, fatigue and unexpected weight change. Negative for chills and fever.   HENT: Negative for congestion and nosebleeds.    Eyes: Negative for discharge.   Respiratory: Negative for shortness of breath and wheezing.    Cardiovascular: Negative for chest pain and palpitations.   Gastrointestinal: Negative for abdominal pain, diarrhea and vomiting.   Genitourinary: Negative for difficulty urinating.   Neurological: Negative for dizziness and weakness.     Objective:     Vital Signs (Most Recent):  Temp: 98.6 °F (37 °C) (09/01/17 0700)  Pulse: 93 (09/01/17 0800)  Resp: (!) 38 (09/01/17 0800)  BP: 122/70 (09/01/17 0800)  SpO2: 95 % (09/01/17 0800) Vital Signs (24h Range):  Temp:  [97.6 °F (36.4 °C)-99.6 °F (37.6 °C)] 98.6 °F (37 °C)  Pulse:  [] 93  Resp:  [14-48] 38  SpO2:  [85 %-100 %] 95 %  BP: ()/(60-98) 122/70     Weight: 61 kg (134 lb 7.7 oz)  Body mass index is 18.76 kg/m².  Body surface area is 1.75 meters squared.      Intake/Output Summary (Last 24 hours) at 09/01/17 0943  Last data filed at 09/01/17 0700   Gross per 24 hour   Intake             2169 ml   Output             1750 ml   Net              419 ml       Physical Exam   HENT:   Head: Normocephalic.   Eyes: Pupils are equal, round, and reactive to light.   Cardiovascular: Normal rate, regular rhythm, normal heart sounds and intact distal pulses.  Exam  reveals no gallop and no friction rub.    No murmur heard.  Pulmonary/Chest: Effort normal and breath sounds normal. No respiratory distress. He has no wheezes.   Abdominal: Soft. He exhibits no distension.   Neurological: He is alert.   Skin: Skin is warm and dry.       Recent Results (from the past 24 hour(s))   POCT glucose    Collection Time: 08/31/17  1:05 PM   Result Value Ref Range    POCT Glucose 57 (L) 70 - 110 mg/dL   CBC auto differential    Collection Time: 09/01/17  3:10 AM   Result Value Ref Range    WBC 10.76 3.90 - 12.70 K/uL    RBC 3.80 (L) 4.60 - 6.20 M/uL    Hemoglobin 12.2 (L) 14.0 - 18.0 g/dL    Hematocrit 34.4 (L) 40.0 - 54.0 %    MCV 91 82 - 98 fL    MCH 32.1 (H) 27.0 - 31.0 pg    MCHC 35.5 32.0 - 36.0 g/dL    RDW 16.2 (H) 11.5 - 14.5 %    Platelets 222 150 - 350 K/uL    MPV 9.6 9.2 - 12.9 fL    Gran # 9.7 (H) 1.8 - 7.7 K/uL    Lymph # 0.6 (L) 1.0 - 4.8 K/uL    Mono # 0.5 0.3 - 1.0 K/uL    Eos # 0.0 0.0 - 0.5 K/uL    Baso # 0.00 0.00 - 0.20 K/uL    Gran% 90.1 (H) 38.0 - 73.0 %    Lymph% 5.6 (L) 18.0 - 48.0 %    Mono% 4.2 4.0 - 15.0 %    Eosinophil% 0.1 0.0 - 8.0 %    Basophil% 0.0 0.0 - 1.9 %    Differential Method Automated    APTT    Collection Time: 09/01/17  3:10 AM   Result Value Ref Range    aPTT 21.8 21.0 - 32.0 sec   Protime-INR    Collection Time: 09/01/17  3:10 AM   Result Value Ref Range    Prothrombin Time 10.8 9.0 - 12.5 sec    INR 1.0 0.8 - 1.2   POCT glucose    Collection Time: 09/01/17  3:18 AM   Result Value Ref Range    POCT Glucose 71 70 - 110 mg/dL   Basic metabolic panel    Collection Time: 09/01/17  4:31 AM   Result Value Ref Range    Sodium 140 136 - 145 mmol/L    Potassium 3.7 3.5 - 5.1 mmol/L    Chloride 104 95 - 110 mmol/L    CO2 27 23 - 29 mmol/L    Glucose 62 (L) 70 - 110 mg/dL    BUN, Bld 17 6 - 20 mg/dL    Creatinine 0.7 0.5 - 1.4 mg/dL    Calcium 7.6 (L) 8.7 - 10.5 mg/dL    Anion Gap 9 8 - 16 mmol/L    eGFR if African American >60.0 >60 mL/min/1.73 m^2    eGFR if  non African American >60.0 >60 mL/min/1.73 m^2   Magnesium    Collection Time: 09/01/17  4:31 AM   Result Value Ref Range    Magnesium 1.3 (L) 1.6 - 2.6 mg/dL   Phosphorus    Collection Time: 09/01/17  4:31 AM   Result Value Ref Range    Phosphorus 3.0 2.7 - 4.5 mg/dL   POCT glucose    Collection Time: 09/01/17  4:37 AM   Result Value Ref Range    POCT Glucose 62 (L) 70 - 110 mg/dL             Assessment/Plan:     * Laryngeal mass    -Patient presented from OSH with SOB, dysphagia, and 25 lb weight loss over past two months  -Found to have laryngeal mass with no evidence of compromised airway  -Tracheostomy performed 8/30, PEG tube placed 8/31  -Biopsy of the mass was performed on 8/30, awaiting final results of the biopsy  -Patient got CT of the chest which showed right upper lobe micro nodule for which radiology recommended continued follow up  -No indication for chemotherapy while inpatient   -Agree with PET outpatient for further workup  -Patient can follow up for management/treatment of laryngeal mass as outpatient  -Will continue to follow up biopsy results and any further developments             Thank you for your consult. I will follow-up with patient. Please contact us if you have any additional questions.    Ravindra Barnett MD  Hematology/Oncology  [facility]        I have reviewed the notes, assessments, and/or procedures performed by the housestaff, as above.  I have personally interviewed and examined the patient at the beside, and rounded with the housestaff. I concur with her/his assessment and plan and the documentation of Luís Esteban.      Await final path results.  Will need PET to complete staging once discharged.  Further treatment recommendations pending these.      More than 70 mins were spent during this encounter, greater than 50% was spent in direct counseling and/or coordination of care.     Cosmo Ortiz M.D., M.S., F.A.C.P.  Hematology/Oncology  Attending  [facility]

## 2017-09-01 NOTE — SUBJECTIVE & OBJECTIVE
Medications:  Continuous Infusions:   dextrose 5 % and 0.45 % NaCl 100 mL/hr at 09/01/17 0700     Scheduled Meds:   enoxaparin  40 mg Subcutaneous Daily    magnesium sulfate IVPB  3 g Intravenous Once    sodium chloride 0.9%  3 mL Intravenous Q8H     PRN Meds:dextrose 50%, dextrose 50%, diphenhydrAMINE, fentaNYL, glucagon (human recombinant), glucose, glucose, hydrALAZINE, hydrocodone-apap 7.5-325 MG/15 ML, HYDROmorphone, insulin aspart, midazolam (PF), morphine, ondansetron, rocuronium     Review of patient's allergies indicates:  No Known Allergies     Past Medical History:   Diagnosis Date    Insulin dependent diabetes mellitus      History reviewed. No pertinent surgical history.  Family History     None        Social History Main Topics    Smoking status: Not on file    Smokeless tobacco: Not on file    Alcohol use Not on file    Drug use: Unknown    Sexual activity: Not on file       Review of Systems   Constitutional: Positive for activity change, appetite change, fatigue and unexpected weight change. Negative for chills and fever.   HENT: Negative for congestion and nosebleeds.    Eyes: Negative for discharge.   Respiratory: Negative for shortness of breath and wheezing.    Cardiovascular: Negative for chest pain and palpitations.   Gastrointestinal: Negative for abdominal pain, diarrhea and vomiting.   Genitourinary: Negative for difficulty urinating.   Neurological: Negative for dizziness and weakness.     Objective:     Vital Signs (Most Recent):  Temp: 98.6 °F (37 °C) (09/01/17 0700)  Pulse: 93 (09/01/17 0800)  Resp: (!) 38 (09/01/17 0800)  BP: 122/70 (09/01/17 0800)  SpO2: 95 % (09/01/17 0800) Vital Signs (24h Range):  Temp:  [97.6 °F (36.4 °C)-99.6 °F (37.6 °C)] 98.6 °F (37 °C)  Pulse:  [] 93  Resp:  [14-48] 38  SpO2:  [85 %-100 %] 95 %  BP: ()/(60-98) 122/70     Weight: 61 kg (134 lb 7.7 oz)  Body mass index is 18.76 kg/m².  Body surface area is 1.75 meters  squared.      Intake/Output Summary (Last 24 hours) at 09/01/17 0943  Last data filed at 09/01/17 0700   Gross per 24 hour   Intake             2169 ml   Output             1750 ml   Net              419 ml       Physical Exam   HENT:   Head: Normocephalic.   Eyes: Pupils are equal, round, and reactive to light.   Cardiovascular: Normal rate, regular rhythm, normal heart sounds and intact distal pulses.  Exam reveals no gallop and no friction rub.    No murmur heard.  Pulmonary/Chest: Effort normal and breath sounds normal. No respiratory distress. He has no wheezes.   Abdominal: Soft. He exhibits no distension.   Neurological: He is alert.   Skin: Skin is warm and dry.       Recent Results (from the past 24 hour(s))   POCT glucose    Collection Time: 08/31/17  1:05 PM   Result Value Ref Range    POCT Glucose 57 (L) 70 - 110 mg/dL   CBC auto differential    Collection Time: 09/01/17  3:10 AM   Result Value Ref Range    WBC 10.76 3.90 - 12.70 K/uL    RBC 3.80 (L) 4.60 - 6.20 M/uL    Hemoglobin 12.2 (L) 14.0 - 18.0 g/dL    Hematocrit 34.4 (L) 40.0 - 54.0 %    MCV 91 82 - 98 fL    MCH 32.1 (H) 27.0 - 31.0 pg    MCHC 35.5 32.0 - 36.0 g/dL    RDW 16.2 (H) 11.5 - 14.5 %    Platelets 222 150 - 350 K/uL    MPV 9.6 9.2 - 12.9 fL    Gran # 9.7 (H) 1.8 - 7.7 K/uL    Lymph # 0.6 (L) 1.0 - 4.8 K/uL    Mono # 0.5 0.3 - 1.0 K/uL    Eos # 0.0 0.0 - 0.5 K/uL    Baso # 0.00 0.00 - 0.20 K/uL    Gran% 90.1 (H) 38.0 - 73.0 %    Lymph% 5.6 (L) 18.0 - 48.0 %    Mono% 4.2 4.0 - 15.0 %    Eosinophil% 0.1 0.0 - 8.0 %    Basophil% 0.0 0.0 - 1.9 %    Differential Method Automated    APTT    Collection Time: 09/01/17  3:10 AM   Result Value Ref Range    aPTT 21.8 21.0 - 32.0 sec   Protime-INR    Collection Time: 09/01/17  3:10 AM   Result Value Ref Range    Prothrombin Time 10.8 9.0 - 12.5 sec    INR 1.0 0.8 - 1.2   POCT glucose    Collection Time: 09/01/17  3:18 AM   Result Value Ref Range    POCT Glucose 71 70 - 110 mg/dL   Basic metabolic  panel    Collection Time: 09/01/17  4:31 AM   Result Value Ref Range    Sodium 140 136 - 145 mmol/L    Potassium 3.7 3.5 - 5.1 mmol/L    Chloride 104 95 - 110 mmol/L    CO2 27 23 - 29 mmol/L    Glucose 62 (L) 70 - 110 mg/dL    BUN, Bld 17 6 - 20 mg/dL    Creatinine 0.7 0.5 - 1.4 mg/dL    Calcium 7.6 (L) 8.7 - 10.5 mg/dL    Anion Gap 9 8 - 16 mmol/L    eGFR if African American >60.0 >60 mL/min/1.73 m^2    eGFR if non African American >60.0 >60 mL/min/1.73 m^2   Magnesium    Collection Time: 09/01/17  4:31 AM   Result Value Ref Range    Magnesium 1.3 (L) 1.6 - 2.6 mg/dL   Phosphorus    Collection Time: 09/01/17  4:31 AM   Result Value Ref Range    Phosphorus 3.0 2.7 - 4.5 mg/dL   POCT glucose    Collection Time: 09/01/17  4:37 AM   Result Value Ref Range    POCT Glucose 62 (L) 70 - 110 mg/dL

## 2017-09-01 NOTE — ASSESSMENT & PLAN NOTE
-Patient presented from OSH with SOB, dysphagia, and 25 lb weight loss over past two months  -Patient found to have laryngeal mass with no evidence of compromised airway  -Tracheostomy performed 8/30, PEG tube placed 8/31  -Biopsy of the mass was performed on 8/30, will await final results of the biopsy before proceeding with workup  -Patient got CT of the chest which showed right upper lobe micro nodule for which radiology recommended continued follow up  -No indication for chemotherapy while inpatient   -Patient can follow up for management/treatment of laryngeal mass as outpatient  -Will continue to follow up biopsy results and any further developments

## 2017-09-01 NOTE — HPI
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     OTHER MEDICAL HISTORY: Patient quit smoking about 16 years ago. He is treated or followed for insulin dependent diabetes. PS is ECOG 2.     PRESENT ILLNESS: Patient was transferred from [facility] to [facility] on 8/30/17 with a history of increasing dyspnea and stridor in the past few days. He reportedly had a biopsy of a laryngeal mass a few months ago without a definite diagnosis. On 8/31/17 he underwent an awake tracheostomy followed by intubation and general anesthesia for direct laryngoscopy exam and biopsy. The exam reports a mass occupying one side of the endolarynx and complete obstruction of the glottis. Frozen section biopsy reports a squamous cell carcinoma. Final path is pending.     PHYSICAL EXAM: Patient is seen in the surgical ICU breathing through the tracheostomy with supplemental oxygen. The respirations are with normal effort and without stridor. He has a near full set of natural teeth. There is no cervical or supraclavicular lymphadenopathy. There are no evident neurologic deficits.     RADIOLOGIC STUDIES: CT of the neck without contrast from the [facility] facility shows a mass destroying the right side of the larynx and bowing into the glottic airway to reduce it to a thin slit that is concave to the right. It extends to the right to destroy the cartilage and into the strap muscles. It extends inferiorly to invade the right lobe of the thyroid. There is a 2 cm mass compressing or obstructing the right jugular vein at the subglottic level. It may be a thrombus in the vein, a 2 cm lymph node or tumor in the right thyroid lobe. I see no other enlarged lymph nodes.     Chest CT on 8/31/17 post operatively shows the trach tube, notes lung changes suggestive of aspiration and a  3 mm nodule in the in the right upper lobe of lung.     LABORATORY STUDIES: On 9/1/17 the Hb is 12.2 with a wbc of 10,760 and a platelet count of 222,000. Basic metabolic panel is unremarkable.     IMPRESSION: Additional imaging studies may reveal metastatic sherwin or other disease. However at present the stage appears to be T4a N0 M0. If surgical resection is elected post operative radiation treatment is recommended. If the cancer is not resected then treatment with radiation and concurrent chemotherapy is recommended.     PLAN: Patient likely can have definitive or post operative adjuvant radiation treatment at [facility] in [place] if he wishes. We will await developments.

## 2017-09-01 NOTE — ASSESSMENT & PLAN NOTE
Related to (etiology):   Laryngeal tumor    Signs and Symptoms (as evidenced by):   NPO w/ need for TF to meet nutr needs    Interventions/Recommendations (treatment strategy):  See recs    Nutrition Diagnosis Status:   New

## 2017-09-01 NOTE — PROCEDURES
Modifed Barium Swallow Study  Speech Start Time: 1222  Speech Stop Time: 1250  Speech Total (min): 28 min    SLP Treatment Date: 09/01/17    Reason for Referral  Patient was referred for a Modified Barium Swallow Study to assess the efficiency of his/her swallow function, rule out aspiration and make recommendations regarding safe dietary consistencies, effective compensatory strategies, and safe eating environment.     Diagnosis   Laryngeal mass    Past Medical History:   Diagnosis Date    Insulin dependent diabetes mellitus      History reviewed. No pertinent surgical history.     General Precautions: aspiration, fall, nectar thick       Recommendations  Dental soft diet/Nectar thick liquids  · Slow rate  · NO straws  · Upright for PO intake  · Small bites/sips  · Multiple swallows  · Awake/alert for PO intake  · Assistance thickening liquids  · Nectar thick liquids  · 6 oz to 1 packet thickener    Oral Peripheral Examination  Oral Musculature Evaluation  Oral Musculature: WFL  Dentition: present and adequate  Mandibular Strength and Mobility: WFL  Oral Labial Strength and Mobility: WFL  Lingual Strength and Mobility: WFL  Voice Prior to PO Intake: Hoarse/breathy with finger occlusion of trach     SLP observed suspected mass like structure during assessment    Consistencies Assessed  Thin liquids x1 tsp, Nectar thick liquids x1 tsp, x3 cup, x4 straw Puree x3 and Solids x1    Oral Preparation / Oral Phase  · Decreased base of tongue  · Adequate mastication time  · Adequate bolus control    Pharyngeal Phase  · Adequate epiglottic inversion  · Multiple spontaneous swallows noted throughout assessment to clear pharyngeal stasis  · Thin liquids  · Premature spillage to vallecula and pyriforms  · Sam aspiration   · Unable to eject with finger occlusion of trach by SLP and cued cough  · Min vallecular pooling  · Clear with multiple swallow  · Nectar thick liquids  · Premature spillage to the vallecula with cup  sips  · Premature spillage to pyriforms with straw sips  · No penetration/aspiration with spoon/cup sips  · Supraglottic penetration seen with straw sip x1  · Unable to eject with finger occlusion of trach by SLP and cued cough  · Minimal pharyngeal stasis  · Cleared with multiple swallow  · Puree  · Premature spillage to vallecula  · No penetration/aspiraiton  · Min vallecular stasis  · Cleared with multiple swallow  · Cracker  · Premature spillage to vallecula  · No penetration/aspiraiton  · Min pharyngeal stasis post swallow  · Cleared with multiple swallow    Cervical Esophageal Phase  Decreased UES opening noted    Impressions  Oropharyngeal dysphagia c/b minimal pharyngeal stasis across all consistencies cleared with multiple spontaneous swallows.Sam aspiration noted with thin liquids via tsp and supraglottic penetration noted with nectar thick liquids.     Prognosis/Plan/Education  Fair/ continue ST/ SLP educated pt on MBS results, diet recs, and compensatory strategies    Care Plan    SLP Goals        Problem: SLP Goal    Goal Priority Disciplines Outcome   SLP Goal     SLP    Description:  Speech Language Pathology Goals  Goals expected to be met by 9/8    1. Pt will tolerate nectar thick liquids/dental soft diet without overt s/s of aspiration.   2. Pt will tolerate solids x10 without overt s/s of aspiration                       Gricel Breen CCC-SLP  Speech Language Pathologist  Pager [phone]  9/1/2017

## 2017-09-01 NOTE — ASSESSMENT & PLAN NOTE
Mr. Esteban is a 48 yo M with DM and HTN who presents as transfer from [facility] with stridor, hoarseness, and known laryngeal mass with CT evidence of bulky laryngeal disease very concerning for malignancy. No immediate airway risk as patient has had slowly progressive stridor and hoarseness fo r past 2 months. Hypercapnea on ABG.    POD 1 s/p DLB and trach (ENT, kaelyn) and PEG (Gen Surg)  Frozen sections c/w invasive SCCa    CT chest with ill-defined pulm nodules, and 1 micronodule (3 mm)    PLAN:  Routine trach care        May change to cuffless POD 4-5  F/u permanent bx       will likely need PET/CT  Home anti-HTN meds       may consider HemOnc and Radonc consults while inpatient  Begin TF via PEG this pm         Fu nutrition recs  SSI (h/o DM2)    DISPO: may step-down to floor today vs tomorrow am; d/c pending trach change; Consult SW for trach and PEG supplies

## 2017-09-01 NOTE — CONSULTS
[facility]  Adult Nutrition  Consult Note    SUMMARY     Recommendations    Recommendation/Intervention: Consult received for TF recs.   1. Impact Peptide 1.5's protein concentration is excessive in meeting this pt's EEN & EPN. For continuous TF, rec Isosource 1.5 w/ a goal rate of 55ml/hr = 1980 cals, 89 gms prot (1.45 gms/kg), & 1026 mls free fl.  For bolus recs, 330mls QID.    2. For free water flushes, consider 240mls QID; this would provide a total of 1986 mls daily b/w free water in TF & flushes.    Goals: TF to meet b/w % EEN/EPN  Nutrition Goal Status: new  Communication of RD Recs: reviewed with RN    Reason for Assessment    Reason for Assessment: physician consult  Diagnosis: other (see comments) (tracheal & laryngeal masses s/p bx, trach/PEG 8/31)  Relevent Medical History: DMII, HTN   Interdisciplinary Rounds: did not attend     General Information Comments: Pt seen this a.m. On trach collar. TF started today via PEG & being glory'd @ 15ml/hr    Nutrition Discharge Planning: Home on TF    Nutrition Prescription Ordered    Current Diet Order: NPO  Nutrition Order Comments: Currently infusing @ 15ml/hr  Current Nutrition Support Formula Ordered: Impact Peptide 1.5  Current Nutrition Support Rate Ordered: 45 (ml)  Current Nutrition Support Frequency Ordered: ml/hr        Evaluation of Received Nutrients/Fluid Intake    Enteral Calories (kcal): 1620  Enteral Protein (gm): 101  Enteral (Free Water) Fluid (mL): 832               Total Calories (kcal): 1620     Energy Calories Required: meeting needs, not meeting needs  % Kcal Needs: 83        Total Protein (gm): 101     Protein Required: exceed needs  % Protein Needs: 117     IV Fluid (mL):  (D5 1/2 NS @100ml/hr)           I/O: + 1.4L x 24 hrs         Fluid Required: other (see comments) (per MD)  Comments: LBM unknown  Tolerance: tolerating  % Intake of Estimated Energy Needs: 75 - 100 %  % Meal Intake: NPO     Nutrition/Diet  "History       Typical Food/Fluid Intake: Likely inadequate pta  Food Preferences: BRADY        Factors Affecting Nutritional Intake: difficulty/impaired swallowing, NPO                Labs/Tests/Procedures/Meds       Pertinent Labs Reviewed: reviewed  Pertinent Labs Comments: Glu 62, POCT 62-71, no A1C noted (Glu well-controlled; no need for diabetic TF @ this time)  Pertinent Medications Reviewed: reviewed       Physical Findings    Overall Physical Appearance: loss of muscle mass, other (see comments) (on trach collar)  Tubes: gastrostomy tube     Skin: incision (sx incision)    Anthropometrics    Temp: 98.2 °F (36.8 °C)     Height: 5' 11" (180.3 cm)  Weight Method: Stated  Weight: 61 kg (134 lb 7.7 oz)  Ideal Body Weight (IBW), Male: 172 lb     % Ideal Body Weight, Male (lb): 78.19 lb     BMI (Calculated): 18.8  BMI Grade: 18.5-24.9 - normal     Usual Body Weight (UBW), kg:  (72.3kg UBW)  Weight Change Amount: 25 lb (x 2 mos)                   Estimated/Assessed Needs    Weight Used For Calorie Calculations: 61 kg (134 lb 7.7 oz)      Energy Calorie Requirements (kcal): 1952  Energy Need Method: Arenac-St Jeor (x 1.3 (PAL) )     RMR (Arenac-St. Jeor Equation): 1502.12        Weight Used For Protein Calculations: 61 kg (134 lb 7.7 oz)  Protein Requirements: 73-86 gms (1.2-1.4 gms/kg)    Fluid Requirements (mL): 1ml/kcal or per MD           RDA Method (mL): 1952     CHO requirement: 50% of total cals or 244 gms        Assessment and Plan    Dysphagia, oropharyngeal    Related to (etiology):   Laryngeal tumor    Signs and Symptoms (as evidenced by):   NPO w/ need for TF to meet nutr needs    Interventions/Recommendations (treatment strategy):  See recs    Nutrition Diagnosis Status:   New              Monitor and Evaluation    Food and Nutrient Intake: enteral nutrition intake, energy intake  Food and Nutrient Adminstration: enteral and parenteral nutrition administration, diet order     Physical Activity and " Function: nutrition-related ADLs and IADLs  Anthropometric Measurements: weight, weight change  Biochemical Data, Medical Tests and Procedures: electrolyte and renal panel, glucose/endocrine profile  Nutrition-Focused Physical Findings: overall appearance    Nutrition Risk    Level of Risk: other (see comments) (F/u 2 x weekly)    Nutrition Follow-Up    RD Follow-up?: Yes

## 2017-09-01 NOTE — PROGRESS NOTES
[facility]  Otorhinolaryngology-Head & Neck Surgery  Progress Note    Subjective:     Post-Op Info:  Procedure(s) (LRB):  TRACHEOTOMY- AWAKE (N/A)  LARYNGOSCOPY (N/A)   1 Day Post-Op  Hospital Day: 3     Interval History: NAEON s/p trach (ENT) and PEG (GenSurg); no resp issues    Medications:  Continuous Infusions:   dextrose 5 % and 0.45 % NaCl 100 mL/hr at 09/01/17 0530     Scheduled Meds:   ceFAZolin (ANCEF) IVPB  2 g Intravenous 30 Min Pre-Op    dexamethasone  8 mg Intravenous 30 Min Pre-Op    enoxaparin  40 mg Subcutaneous Daily    insulin detemir  20 Units Subcutaneous QHS    magnesium sulfate IVPB  3 g Intravenous Once    sodium chloride 0.9%  3 mL Intravenous Q8H     PRN Meds:dextrose 50%, diphenhydrAMINE, fentaNYL, glucagon (human recombinant), hydrALAZINE, hydrocodone-acetaminophen 5-325mg, HYDROmorphone, insulin aspart, midazolam (PF), morphine, ondansetron, rocuronium     Review of patient's allergies indicates:  No Known Allergies  Objective:     Vital Signs (24h Range):  Temp:  [97.5 °F (36.4 °C)-99.6 °F (37.6 °C)] 99.6 °F (37.6 °C)  Pulse:  [] 99  Resp:  [12-48] 48  SpO2:  [85 %-100 %] 97 %  BP: ()/(60-98) 130/77        Lines/Drains/Airways     Drain                 Gastrostomy/Enterostomy 08/31/17 1900 less than 1 day          Airway                 Surgical Airway 08/31/17 0734 Shiley Cuffed 1 day          Peripheral Intravenous Line                 Peripheral IV - Single Lumen 08/30/17 2100 Left Wrist 1 day         Peripheral IV - Single Lumen 08/31/17 1100 Right Forearm less than 1 day                Physical Exam  NAD  No resp distress on humidified 02 via trach collar  6.0 DCT (deflated) sutured in place and secured with trach tie  Neck soft    Significant Labs:    CBC    Recent Labs  Lab 08/30/17  1806 08/31/17  0421 09/01/17  0310   WBC 10.18 16.50* 10.76   HGB 13.2* 12.2* 12.2*   HCT 37.7* 35.3* 34.4*   MCV 88 90 91    282 222     BMP    Recent  Labs  Lab 08/30/17  1806 08/31/17  0317 09/01/17  0431   GLU 78 313* 62*    133* 140   K 4.4 5.2* 3.7   CL 98 97 104   CO2 34* 23 27   BUN 18 19 17   CREATININE 0.9 1.2 0.7   CALCIUM 9.6 9.6 7.6*   PHOS  --  3.3 3.0   MG  --  2.2 1.3*     COAGS    Recent Labs  Lab 08/30/17  1806 08/31/17  0421 09/01/17  0310   INR 0.9 1.0 1.0         Significant Diagnostics:  CT chest 8/31/17:   Subsegmental ground glass opacities and scattered nodules within the lung bases and right middle lobe most concerning for aspiration given retained secretions within the right bronchus intermedius and segmental and subsegmental bronchi.    Right upper lobe pulmonary micronodule.  Continued followup is recommended with timing to be determined by clinical considerations.    Tracheostomy tube with scattered gas within the supraclavicular regions and upper mediastinum.    Assessment/Plan:     * Laryngeal mass    Mr. Esteban is a 48 yo M with DM and HTN who presents as transfer from [facility] with stridor, hoarseness, and known laryngeal mass with CT evidence of bulky laryngeal disease very concerning for malignancy. No immediate airway risk as patient has had slowly progressive stridor and hoarseness fo r past 2 months. Hypercapnea on ABG.    POD 1 s/p DLB and trach (ENT, kaelyn) and PEG (Gen Surg)  Frozen sections c/w invasive SCCa    CT chest with ill-defined pulm nodules, and 1 micronodule (3 mm)    PLAN:  Routine trach care        May change to cuffless POD 4-5  F/u permanent bx       will likely need PET/CT  Home anti-HTN meds       may consider HemOnc and Radonc consults while inpatient  Begin TF via PEG this pm         Fu nutrition recs  SSI (h/o DM2)    DISPO: may step-down to floor today vs tomorrow am; d/c pending trach change; Consult SW for trach and PEG supplies                 Wiliam Meeks MD  Otorhinolaryngology-Head & Neck Surgery  [facility]

## 2017-09-01 NOTE — PROGRESS NOTES
Progress Note  Surgical Intensive Care    Admit Date: 8/30/2017  Post-operative Day: 1 Day Post-Op  Hospital Day: 3    SUBJECTIVE:     Follow-up For:  Procedure(s) (LRB):  TRACHEOTOMY- AWAKE (N/A)  LARYNGOSCOPY (N/A)    HPI:    Patient is a 47 y.o. male with a tracheal mass who presents from OSH with complaint of shortness of breath, dysphagia, and 25 lb weight loss over the past 2 months.  He saw a physician at an OSH at the onset of sxs who told him it was a benign mass, Symptoms have rapidly progressed over past week 2 weeks with decreased PO intake. ENT saw patient in ED and performed fiberoptic laryngoscopy which showed large, obstructing laryngeal tumor worrisome for malignancy.   He was admitted to the SICU overnight with plan for bedside trach justine;  no supplemental O2 requirements at this time, 95-99% on RA, HDS.     Interval history:    NAEON. Trach by ENT and PEG by SICU yesterday. No respiratory issues overnight. Pt reports 9/10 pain in throat.     Continuous Infusions:   dextrose 5 % and 0.45 % NaCl 100 mL/hr at 09/01/17 0700     Scheduled Meds:   enoxaparin  40 mg Subcutaneous Daily    magnesium sulfate IVPB  3 g Intravenous Once    sodium chloride 0.9%  3 mL Intravenous Q8H     PRN Meds:dextrose 50%, dextrose 50%, diphenhydrAMINE, fentaNYL, glucagon (human recombinant), glucose, glucose, hydrALAZINE, hydrocodone-apap 7.5-325 MG/15 ML, HYDROmorphone, insulin aspart, midazolam (PF), morphine, ondansetron, rocuronium    Review of patient's allergies indicates:  No Known Allergies    OBJECTIVE:     Vital Signs (Most Recent)  Temp: 98.6 °F (37 °C) (09/01/17 0700)  Pulse: 106 (09/01/17 0700)  Resp: (!) 26 (09/01/17 0700)  BP: 126/75 (09/01/17 0700)  SpO2: 97 % (09/01/17 0700)    Vital Signs Range (Last 24H):  Temp:  [97.5 °F (36.4 °C)-99.6 °F (37.6 °C)]   Pulse:  []   Resp:  [12-48]   BP: ()/(60-98)   SpO2:  [85 %-100 %]     I & O (Last 24H):  Intake/Output Summary (Last 24 hours) at 09/01/17  0817  Last data filed at 09/01/17 0700   Gross per 24 hour   Intake             2169 ml   Output             1750 ml   Net              419 ml     Ventilator Data (Last 24H):     Vent Mode: Spont  Oxygen Concentration (%):  [35-40] 35  Resp Rate Total:  [16 br/min-34 br/min] 34 br/min  Vt Set:  [450 mL] 450 mL  PEEP/CPAP:  [5 cmH20] 5 cmH20  Pressure Support:  [0 cmH20-5 cmH20] 5 cmH20  Mean Airway Pressure:  [8.1 cmH20-9.8 cmH20] 8.1 cmH20       Physical Exam:  NAD, cachetic in appearance  AAOx3  NCAT, MMM, FROM neck flexion and extension  Cuffed trach in place with trach collar   Abd: soft/nt/nd, PEG in place to gravity with minimal output  Moves all extremities  RLE: ulcer between 1st and 2nd toe      Laboratory (Last 24H):  CBC:    Recent Labs  Lab 09/01/17  0310   WBC 10.76   HGB 12.2*   HCT 34.4*        CMP:    Recent Labs  Lab 09/01/17  0431   CALCIUM 7.6*      K 3.7   CO2 27      BUN 17   CREATININE 0.7       Chest X-Ray: No results found in the last 24 hours.      ASSESSMENT/PLAN:       Plan:  Neuro:   -Pain control: will add oxycodone elixir through PEG for better pain control with breakthrough IV dilaudid and morphine     Pulmonary:   - 8L 35% trach collar  - monitor airway     Cardiac:  -PRN hydralazine for SBP >165     Renal:   - urinating without difficulty     Fluids/Electrolytes/Nutrition/GI:   - begin TF via PEG this am  -replace lytes PRN  -1/2NS+ 20KCl @ 100 ml/hr       Hematology/Oncology:  -H/H- 13.2/37.7     Infectious Disease:   -Afebrile  -WBC 10.18     Endocrine:  -Glucose goal of 120-150  -SSI     Dispo:  -Continue care in the ICU setting vs step down today- will discuss with primary team     Sabrina Smith   PGY-1  [phone]

## 2017-09-01 NOTE — PROGRESS NOTES
Dr. NO Rod notified of pts HR >125, sustaining. EKG ordered. 5mg IVP Metoprolol ordered. See flowsheet for details.

## 2017-09-02 LAB
ALBUMIN SERPL BCP-MCNC: 2.4 G/DL
ALP SERPL-CCNC: 62 U/L
ALT SERPL W/O P-5'-P-CCNC: 13 U/L
ANION GAP SERPL CALC-SCNC: 7 MMOL/L
AST SERPL-CCNC: 19 U/L
BASOPHILS # BLD AUTO: 0.01 K/UL
BASOPHILS NFR BLD: 0.1 %
BILIRUB SERPL-MCNC: 0.6 MG/DL
BUN SERPL-MCNC: 29 MG/DL
CALCIUM SERPL-MCNC: 9.3 MG/DL
CHLORIDE SERPL-SCNC: 95 MMOL/L
CO2 SERPL-SCNC: 30 MMOL/L
CREAT SERPL-MCNC: 1 MG/DL
DIFFERENTIAL METHOD: ABNORMAL
EOSINOPHIL # BLD AUTO: 0 K/UL
EOSINOPHIL NFR BLD: 0 %
ERYTHROCYTE [DISTWIDTH] IN BLOOD BY AUTOMATED COUNT: 16.5 %
EST. GFR  (AFRICAN AMERICAN): >60 ML/MIN/1.73 M^2
EST. GFR  (NON AFRICAN AMERICAN): >60 ML/MIN/1.73 M^2
GLUCOSE SERPL-MCNC: 343 MG/DL
HCT VFR BLD AUTO: 33 %
HGB BLD-MCNC: 11.7 G/DL
LYMPHOCYTES # BLD AUTO: 0.5 K/UL
LYMPHOCYTES NFR BLD: 4.4 %
MAGNESIUM SERPL-MCNC: 2.3 MG/DL
MCH RBC QN AUTO: 31 PG
MCHC RBC AUTO-ENTMCNC: 35.5 G/DL
MCV RBC AUTO: 88 FL
MONOCYTES # BLD AUTO: 0.3 K/UL
MONOCYTES NFR BLD: 2.6 %
NEUTROPHILS # BLD AUTO: 10.8 K/UL
NEUTROPHILS NFR BLD: 92.9 %
PHOSPHATE SERPL-MCNC: 2.1 MG/DL
PLATELET # BLD AUTO: 232 K/UL
PMV BLD AUTO: 11.1 FL
POCT GLUCOSE: 232 MG/DL (ref 70–110)
POCT GLUCOSE: 266 MG/DL (ref 70–110)
POCT GLUCOSE: 287 MG/DL (ref 70–110)
POCT GLUCOSE: 288 MG/DL (ref 70–110)
POCT GLUCOSE: 292 MG/DL (ref 70–110)
POCT GLUCOSE: 307 MG/DL (ref 70–110)
POCT GLUCOSE: 314 MG/DL (ref 70–110)
POCT GLUCOSE: 327 MG/DL (ref 70–110)
POCT GLUCOSE: 341 MG/DL (ref 70–110)
POCT GLUCOSE: 451 MG/DL (ref 70–110)
POTASSIUM SERPL-SCNC: 4.8 MMOL/L
PROT SERPL-MCNC: 6.5 G/DL
RBC # BLD AUTO: 3.77 M/UL
SODIUM SERPL-SCNC: 132 MMOL/L
WBC # BLD AUTO: 11.6 K/UL

## 2017-09-02 PROCEDURE — A4216 STERILE WATER/SALINE, 10 ML: HCPCS | Performed by: STUDENT IN AN ORGANIZED HEALTH CARE EDUCATION/TRAINING PROGRAM

## 2017-09-02 PROCEDURE — 84100 ASSAY OF PHOSPHORUS: CPT

## 2017-09-02 PROCEDURE — 63600175 PHARM REV CODE 636 W HCPCS

## 2017-09-02 PROCEDURE — 99900026 HC AIRWAY MAINTENANCE (STAT)

## 2017-09-02 PROCEDURE — 25000003 PHARM REV CODE 250: Performed by: ANESTHESIOLOGY

## 2017-09-02 PROCEDURE — 83735 ASSAY OF MAGNESIUM: CPT

## 2017-09-02 PROCEDURE — 25000003 PHARM REV CODE 250: Performed by: STUDENT IN AN ORGANIZED HEALTH CARE EDUCATION/TRAINING PROGRAM

## 2017-09-02 PROCEDURE — 85025 COMPLETE CBC W/AUTO DIFF WBC: CPT

## 2017-09-02 PROCEDURE — 63600175 PHARM REV CODE 636 W HCPCS: Performed by: STUDENT IN AN ORGANIZED HEALTH CARE EDUCATION/TRAINING PROGRAM

## 2017-09-02 PROCEDURE — 80053 COMPREHEN METABOLIC PANEL: CPT

## 2017-09-02 PROCEDURE — 20600001 HC STEP DOWN PRIVATE ROOM

## 2017-09-02 RX ORDER — SODIUM,POTASSIUM PHOSPHATES 280-250MG
1 POWDER IN PACKET (EA) ORAL ONCE
Status: COMPLETED | OUTPATIENT
Start: 2017-09-02 | End: 2017-09-02

## 2017-09-02 RX ORDER — SODIUM CHLORIDE 9 MG/ML
INJECTION, SOLUTION INTRAVENOUS CONTINUOUS
Status: DISCONTINUED | OUTPATIENT
Start: 2017-09-02 | End: 2017-09-06

## 2017-09-02 RX ORDER — GLUCAGON 1 MG
1 KIT INJECTION
Status: DISCONTINUED | OUTPATIENT
Start: 2017-09-02 | End: 2017-09-03

## 2017-09-02 RX ORDER — INSULIN ASPART 100 [IU]/ML
1-10 INJECTION, SOLUTION INTRAVENOUS; SUBCUTANEOUS EVERY 4 HOURS PRN
Status: DISCONTINUED | OUTPATIENT
Start: 2017-09-02 | End: 2017-09-03

## 2017-09-02 RX ADMIN — Medication 3 ML: at 10:09

## 2017-09-02 RX ADMIN — SODIUM CHLORIDE: 0.9 INJECTION, SOLUTION INTRAVENOUS at 11:09

## 2017-09-02 RX ADMIN — INSULIN ASPART 8 UNITS: 100 INJECTION, SOLUTION INTRAVENOUS; SUBCUTANEOUS at 04:09

## 2017-09-02 RX ADMIN — INSULIN ASPART 3 UNITS: 100 INJECTION, SOLUTION INTRAVENOUS; SUBCUTANEOUS at 08:09

## 2017-09-02 RX ADMIN — INSULIN ASPART 8 UNITS: 100 INJECTION, SOLUTION INTRAVENOUS; SUBCUTANEOUS at 08:09

## 2017-09-02 RX ADMIN — Medication 3 ML: at 03:09

## 2017-09-02 RX ADMIN — OXYCODONE HYDROCHLORIDE 10 MG: 5 SOLUTION ORAL at 08:09

## 2017-09-02 RX ADMIN — INSULIN ASPART 6 UNITS: 100 INJECTION, SOLUTION INTRAVENOUS; SUBCUTANEOUS at 12:09

## 2017-09-02 RX ADMIN — OXYCODONE HYDROCHLORIDE 10 MG: 5 SOLUTION ORAL at 09:09

## 2017-09-02 RX ADMIN — OXYCODONE HYDROCHLORIDE 10 MG: 5 SOLUTION ORAL at 04:09

## 2017-09-02 RX ADMIN — OXYCODONE HYDROCHLORIDE 10 MG: 5 SOLUTION ORAL at 03:09

## 2017-09-02 RX ADMIN — ENOXAPARIN SODIUM 40 MG: 100 INJECTION SUBCUTANEOUS at 04:09

## 2017-09-02 RX ADMIN — POTASSIUM & SODIUM PHOSPHATES POWDER PACK 280-160-250 MG 1 PACKET: 280-160-250 PACK at 06:09

## 2017-09-02 RX ADMIN — Medication 3 ML: at 06:09

## 2017-09-02 NOTE — PROGRESS NOTES
[facility]  Otorhinolaryngology-Head & Neck Surgery  Progress Note    Subjective:     Post-Op Info:  Procedure(s) (LRB):  TRACHEOTOMY- AWAKE (N/A)  LARYNGOSCOPY (N/A)   2 Days Post-Op  Hospital Day: 4     Interval History: 101.2 overnight - pan-cultured by SICU. Breathing well via trach collar O2. Tolerating dental soft and nectar thick as well as G-tube feeds. No other complaints.    Medications:  Continuous Infusions:   sodium chloride 0.9% 55 mL/hr at 09/02/17 1200     Scheduled Meds:   enoxaparin  40 mg Subcutaneous Daily    sodium chloride 0.9%  3 mL Intravenous Q8H     PRN Meds:dextrose 50%, diphenhydrAMINE, fentaNYL, glucagon (human recombinant), glucose, glucose, hydrALAZINE, HYDROmorphone, insulin aspart, midazolam (PF), ondansetron, oxycodone, oxycodone, rocuronium     Review of patient's allergies indicates:  No Known Allergies  Objective:     Vital Signs (24h Range):  Temp:  [98.2 °F (36.8 °C)-101.2 °F (38.4 °C)] 98.5 °F (36.9 °C)  Pulse:  [] 91  Resp:  [21-35] 21  SpO2:  [93 %-98 %] 96 %  BP: (104-150)/(58-82) 125/67       Date 09/02/17 0700 - 09/03/17 0659   Shift 8719-4585 3823-7786 3012-2258 24 Hour Total   I  N  T  A  K  E   P.O. 120   120    NG/   300    Shift Total  (mL/kg) 420  (6.9)   420  (6.9)   O  U  T  P  U  T   Urine  (mL/kg/hr) 450   450    Shift Total  (mL/kg) 450  (7.4)   450  (7.4)   Weight (kg) 61 61 61 61     Lines/Drains/Airways     Drain                 Gastrostomy/Enterostomy 08/31/17 1900 1 day          Airway                 Surgical Airway 08/31/17 0734 Shiley Cuffed 2 days          Peripheral Intravenous Line                 Peripheral IV - Single Lumen 08/30/17 2100 Left Wrist 2 days         Peripheral IV - Single Lumen 08/31/17 1100 Right Forearm 2 days                Physical Exam  NAD  No resp distress on humidified 02 via trach collar  6.0 DCT (deflated) sutured in place and secured with trach tie  Neck soft    Significant Labs:  ABGs:    Recent Labs  Lab 08/30/17  1812   PH 7.394   PCO2 60.7*   HCO3 37.1*   POCSATURATED 95   BE 12     CBC:   Recent Labs  Lab 09/02/17  0345   WBC 11.60   RBC 3.77*   HGB 11.7*   HCT 33.0*      MCV 88   MCH 31.0   MCHC 35.5     CMP:   Recent Labs  Lab 09/02/17  0345   *   CALCIUM 9.3   ALBUMIN 2.4*   PROT 6.5   *   K 4.8   CO2 30*   CL 95   BUN 29*   CREATININE 1.0   ALKPHOS 62   ALT 13   AST 19   BILITOT 0.6       Significant Diagnostics:  None    Assessment/Plan:     Squamous cell carcinoma of larynx    Mr. Esteban is a 46 yo M with DM and HTN who presents as transfer from [facility] with stridor, hoarseness, and known laryngeal mass with CT evidence of bulky laryngeal disease very concerning for malignancy. No immediate airway risk as patient has had slowly progressive stridor and hoarseness fo r past 2 months. Hypercapnea on ABG.     POD 2 s/p DLB and trach (ENT, kaelyn) and PEG (Gen Surg)  Frozen sections c/w invasive SCCa     CT chest with ill-defined pulm nodules, and 1 micronodule (3 mm)     PLAN:  F/u pan cultures per SICU for fever to 101.2 overnight; likely atelectasis related POD #2  Routine trach care                   May change to cuffless POD 4-5  F/u permanent bx - invasive keratinizing poorly differentiated carcinoma       will likely need PET/CT  Home anti-HTN meds       HemOnc and Radonc consulted while inpatient   Rad onc: Radiation treatment either as adjuvant to the laryngectomy or as definitive treatment with concurrent chemotherapy anticipated  Cont TF via PEG; Fu nutrition recs  OK for dental soft and nectar thicks, NO thin liquids  SSI (h/o DM2)     DISPO: may step-down to floor today vs tomorrow am; d/c pending trach change; Consult SW for trach and PEG supplies            Tavo Dale MD  Otorhinolaryngology-Head & Neck Surgery  [facility]

## 2017-09-02 NOTE — SUBJECTIVE & OBJECTIVE
Interval History: NAEON. Breathing well via trach collar O2. Tolerating dental soft and nectar thick as well as G-tube feeds.    Medications:  Continuous Infusions:   sodium chloride 0.9% 55 mL/hr at 09/02/17 1200     Scheduled Meds:   enoxaparin  40 mg Subcutaneous Daily    sodium chloride 0.9%  3 mL Intravenous Q8H     PRN Meds:dextrose 50%, diphenhydrAMINE, fentaNYL, glucagon (human recombinant), glucose, glucose, hydrALAZINE, HYDROmorphone, insulin aspart, midazolam (PF), ondansetron, oxycodone, oxycodone, rocuronium     Review of patient's allergies indicates:  No Known Allergies  Objective:     Vital Signs (24h Range):  Temp:  [98.2 °F (36.8 °C)-101.2 °F (38.4 °C)] 98.5 °F (36.9 °C)  Pulse:  [] 91  Resp:  [21-35] 21  SpO2:  [93 %-98 %] 96 %  BP: (104-150)/(58-82) 125/67       Date 09/02/17 0700 - 09/03/17 0659   Shift 9991-2577 1137-5168 4619-5673 24 Hour Total   I  N  T  A  K  E   P.O. 120   120    NG/   300    Shift Total  (mL/kg) 420  (6.9)   420  (6.9)   O  U  T  P  U  T   Urine  (mL/kg/hr) 450   450    Shift Total  (mL/kg) 450  (7.4)   450  (7.4)   Weight (kg) 61 61 61 61     Lines/Drains/Airways     Drain                 Gastrostomy/Enterostomy 08/31/17 1900 1 day          Airway                 Surgical Airway 08/31/17 0734 Shiley Cuffed 2 days          Peripheral Intravenous Line                 Peripheral IV - Single Lumen 08/30/17 2100 Left Wrist 2 days         Peripheral IV - Single Lumen 08/31/17 1100 Right Forearm 2 days                Physical Exam  NAD  No resp distress on humidified 02 via trach collar  6.0 DCT (deflated) sutured in place and secured with trach tie  Neck soft    Significant Labs:  ABGs:   Recent Labs  Lab 08/30/17  1812   PH 7.394   PCO2 60.7*   HCO3 37.1*   POCSATURATED 95   BE 12     CBC:   Recent Labs  Lab 09/02/17  0345   WBC 11.60   RBC 3.77*   HGB 11.7*   HCT 33.0*      MCV 88   MCH 31.0   MCHC 35.5     CMP:   Recent Labs  Lab 09/02/17 0345   GLU  343*   CALCIUM 9.3   ALBUMIN 2.4*   PROT 6.5   *   K 4.8   CO2 30*   CL 95   BUN 29*   CREATININE 1.0   ALKPHOS 62   ALT 13   AST 19   BILITOT 0.6       Significant Diagnostics:  None

## 2017-09-02 NOTE — ASSESSMENT & PLAN NOTE
Mr. Esteban is a 46 yo M with DM and HTN who presents as transfer from [facility] with stridor, hoarseness, and known laryngeal mass with CT evidence of bulky laryngeal disease very concerning for malignancy. No immediate airway risk as patient has had slowly progressive stridor and hoarseness fo r past 2 months. Hypercapnea on ABG.     POD 2 s/p DLB and trach (ENT, kaelyn) and PEG (Gen Surg)  Frozen sections c/w invasive SCCa     CT chest with ill-defined pulm nodules, and 1 micronodule (3 mm)     PLAN:  F/u pan cultures per SICU for fever to 101.2 overnight; likely atelectasis related POD #2  Routine trach care                   May change to cuffless POD 4-5  F/u permanent bx - invasive keratinizing poorly differentiated carcinoma       will likely need PET/CT  Home anti-HTN meds       HemOnc and Radonc consulted while inpatient   Rad onc: Radiation treatment either as adjuvant to the laryngectomy or as definitive treatment with concurrent chemotherapy anticipated  Cont TF via PEG; Fu nutrition recs  OK for dental soft and nectar thicks, NO thin liquids  SSI (h/o DM2)     DISPO: may step-down to floor today vs tomorrow am; d/c pending trach change; Consult SW for trach and PEG supplies

## 2017-09-02 NOTE — PLAN OF CARE
Problem: Patient Care Overview  Goal: Plan of Care Review  Outcome: Ongoing (interventions implemented as appropriate)  Patient hemodynamically stable. Currently on trach collar, denies SOB, thick yellow secretions from tracheostomy. Tmax 101.2, pan cultures drawn, 1g IV tylenol administered. TF @ goal of 45 ml/hr tolerating well, PEG site tender. BS active no BM this shift. Voiding per urinal. Monitor vital signs. Monitor labs and renal function, replace as appropriate. Control pain. Monitor respiratory status. Continue nutritional support. Step down to telemetry floor when bed available. Plan of care reviewed with patient and siblings.

## 2017-09-02 NOTE — NURSING TRANSFER
Nursing Transfer Note      9/2/2017     Transfer To: [address] [facility]    Transfer via wheelchair    Transfer with cardiac monitoring    Transported by RN    Medicines sent: insulin pens    Chart send with patient: Yes    Notified: daughter    Patient reassessed at: 1815    Upon arrival to floor: patient oriented to room, call bell in reach and bed in lowest position. Bedside report given to receiving RN.

## 2017-09-02 NOTE — NURSING
Spoke to MD about blood glucose monitoring frequency and prn sliding scale. MD to make appropriate changes.

## 2017-09-02 NOTE — PROGRESS NOTES
Progress Note  Surgical Intensive Care    Admit Date: 8/30/2017  Post-operative Day: 2 Days Post-Op  Hospital Day: 4    SUBJECTIVE:     Follow-up For:  Procedure(s) (LRB):  TRACHEOTOMY- AWAKE (N/A)  LARYNGOSCOPY (N/A)    HPI:    Patient is a 47 y.o. male with a tracheal mass who presents from OSH with complaint of shortness of breath, dysphagia, and 25 lb weight loss over the past 2 months.  He saw a physician at an OSH at the onset of sxs who told him it was a benign mass, Symptoms have rapidly progressed over past week 2 weeks with decreased PO intake. ENT saw patient in ED and performed fiberoptic laryngoscopy which showed large, obstructing laryngeal tumor worrisome for malignancy.   He was admitted to the SICU overnight with plan for bedside trach justine;  no supplemental O2 requirements at this time, 95-99% on RA, HDS.     Interval history:    NAEON. Pain controlled. Mariama TF, no nausea.     Continuous Infusions:   sodium chloride 0.9%       Scheduled Meds:   enoxaparin  40 mg Subcutaneous Daily    sodium chloride 0.9%  3 mL Intravenous Q8H     PRN Meds:dextrose 50%, diphenhydrAMINE, fentaNYL, glucagon (human recombinant), glucose, glucose, hydrALAZINE, HYDROmorphone, insulin aspart, midazolam (PF), ondansetron, oxycodone, oxycodone, rocuronium    Review of patient's allergies indicates:  No Known Allergies    OBJECTIVE:     Vital Signs (Most Recent)  Temp: 98.5 °F (36.9 °C) (09/02/17 0700)  Pulse: (!) 115 (09/02/17 1000)  Resp: (!) 28 (09/02/17 1000)  BP: 138/82 (09/02/17 1000)  SpO2: (!) 94 % (09/02/17 1000)    Vital Signs Range (Last 24H):  Temp:  [98.2 °F (36.8 °C)-101.2 °F (38.4 °C)]   Pulse:  []   Resp:  [22-38]   BP: (104-150)/(58-82)   SpO2:  [91 %-98 %]     I & O (Last 24H):    Intake/Output Summary (Last 24 hours) at 09/02/17 1038  Last data filed at 09/02/17 1000   Gross per 24 hour   Intake             2691 ml   Output             2550 ml   Net              141 ml     Ventilator Data (Last  24H):     Oxygen Concentration (%):  [35] 35       Physical Exam:  NAD, cachetic in appearance  AAOx3  NCAT, MMM, FROM neck flexion and extension  Cuffed trach in place with trach collar   Abd: soft/nt/nd, PEG in place to gravity with minimal output  Moves all extremities  RLE: ulcer between 1st and 2nd toe      Laboratory (Last 24H):  CBC:    Recent Labs  Lab 09/02/17  0345   WBC 11.60   HGB 11.7*   HCT 33.0*        CMP:    Recent Labs  Lab 09/02/17  0345   CALCIUM 9.3   ALBUMIN 2.4*   PROT 6.5   *   K 4.8   CO2 30*   CL 95   BUN 29*   CREATININE 1.0   ALKPHOS 62   ALT 13   AST 19   BILITOT 0.6       Chest X-Ray: No results found in the last 24 hours.      ASSESSMENT/PLAN:       Plan:  Neuro:   -Pain control: with oxycodone elixir through PEG and breakthrough IV dilaudid and morphine     Pulmonary:   - 8L 35% trach collar  - monitor airway     Cardiac:  -PRN hydralazine for SBP >165     Renal:   - urinating without difficulty     Fluids/Electrolytes/Nutrition/GI:   - tolerating TF  -replace lytes PRN  -1/2NS+ 20KCl @ 100 ml/hr       Hematology/Oncology:  -H/H- 11.7/33     Infectious Disease:   -Tmax: 101.2  -cultures pending  -WBC 11.60     Endocrine:  -Glucose goal of 120-150  -SSI     Dispo:  -step down to floor today     Sabrina Smith   PGY-1  [phone]

## 2017-09-02 NOTE — CONSULTS
Patient seen for wound care consult. Patient with what appears to be a corn or callus between the great and second toe of his right foot. The skin is intact and there is no drainage. It is approx 1 x 1 cm dry thick and yellow skin. Foam applied to cushion between the toes and prevent further rubbing. Will follow as needed Nursing to continue care.

## 2017-09-03 LAB
ALBUMIN SERPL BCP-MCNC: 2.2 G/DL
ALP SERPL-CCNC: 77 U/L
ALT SERPL W/O P-5'-P-CCNC: 10 U/L
ANION GAP SERPL CALC-SCNC: 9 MMOL/L
AST SERPL-CCNC: 13 U/L
BACTERIA UR CULT: NO GROWTH
BASOPHILS # BLD AUTO: 0.01 K/UL
BASOPHILS NFR BLD: 0.1 %
BILIRUB SERPL-MCNC: 0.3 MG/DL
BUN SERPL-MCNC: 24 MG/DL
CALCIUM SERPL-MCNC: 9.7 MG/DL
CHLORIDE SERPL-SCNC: 96 MMOL/L
CO2 SERPL-SCNC: 31 MMOL/L
CREAT SERPL-MCNC: 0.8 MG/DL
DIFFERENTIAL METHOD: ABNORMAL
EOSINOPHIL # BLD AUTO: 0 K/UL
EOSINOPHIL NFR BLD: 0.2 %
ERYTHROCYTE [DISTWIDTH] IN BLOOD BY AUTOMATED COUNT: 16.4 %
EST. GFR  (AFRICAN AMERICAN): >60 ML/MIN/1.73 M^2
EST. GFR  (NON AFRICAN AMERICAN): >60 ML/MIN/1.73 M^2
ESTIMATED AVG GLUCOSE: 229 MG/DL
GLUCOSE SERPL-MCNC: 247 MG/DL
HBA1C MFR BLD HPLC: 9.6 %
HCT VFR BLD AUTO: 30.3 %
HGB BLD-MCNC: 10.7 G/DL
LYMPHOCYTES # BLD AUTO: 0.5 K/UL
LYMPHOCYTES NFR BLD: 4.8 %
MCH RBC QN AUTO: 30.9 PG
MCHC RBC AUTO-ENTMCNC: 35.3 G/DL
MCV RBC AUTO: 88 FL
MONOCYTES # BLD AUTO: 0.5 K/UL
MONOCYTES NFR BLD: 4.1 %
NEUTROPHILS # BLD AUTO: 10.3 K/UL
NEUTROPHILS NFR BLD: 90.6 %
PLATELET # BLD AUTO: 220 K/UL
PMV BLD AUTO: 10.8 FL
POCT GLUCOSE: 220 MG/DL (ref 70–110)
POCT GLUCOSE: 260 MG/DL (ref 70–110)
POCT GLUCOSE: 307 MG/DL (ref 70–110)
POCT GLUCOSE: 323 MG/DL (ref 70–110)
POCT GLUCOSE: 342 MG/DL (ref 70–110)
POCT GLUCOSE: 427 MG/DL (ref 70–110)
POTASSIUM SERPL-SCNC: 3.9 MMOL/L
PROT SERPL-MCNC: 6.4 G/DL
RBC # BLD AUTO: 3.46 M/UL
SODIUM SERPL-SCNC: 136 MMOL/L
WBC # BLD AUTO: 11.35 K/UL

## 2017-09-03 PROCEDURE — 85025 COMPLETE CBC W/AUTO DIFF WBC: CPT

## 2017-09-03 PROCEDURE — 83036 HEMOGLOBIN GLYCOSYLATED A1C: CPT

## 2017-09-03 PROCEDURE — 63600175 PHARM REV CODE 636 W HCPCS

## 2017-09-03 PROCEDURE — 27200966 HC CLOSED SUCTION SYSTEM

## 2017-09-03 PROCEDURE — 27000221 HC OXYGEN, UP TO 24 HOURS

## 2017-09-03 PROCEDURE — 63600175 PHARM REV CODE 636 W HCPCS: Performed by: STUDENT IN AN ORGANIZED HEALTH CARE EDUCATION/TRAINING PROGRAM

## 2017-09-03 PROCEDURE — 80053 COMPREHEN METABOLIC PANEL: CPT

## 2017-09-03 PROCEDURE — 20600001 HC STEP DOWN PRIVATE ROOM

## 2017-09-03 PROCEDURE — 99900026 HC AIRWAY MAINTENANCE (STAT)

## 2017-09-03 PROCEDURE — 36415 COLL VENOUS BLD VENIPUNCTURE: CPT

## 2017-09-03 PROCEDURE — 25000003 PHARM REV CODE 250: Performed by: ANESTHESIOLOGY

## 2017-09-03 PROCEDURE — A4216 STERILE WATER/SALINE, 10 ML: HCPCS | Performed by: STUDENT IN AN ORGANIZED HEALTH CARE EDUCATION/TRAINING PROGRAM

## 2017-09-03 PROCEDURE — 25000003 PHARM REV CODE 250: Performed by: STUDENT IN AN ORGANIZED HEALTH CARE EDUCATION/TRAINING PROGRAM

## 2017-09-03 RX ORDER — INSULIN ASPART 100 [IU]/ML
8 INJECTION, SOLUTION INTRAVENOUS; SUBCUTANEOUS ONCE
Status: COMPLETED | OUTPATIENT
Start: 2017-09-03 | End: 2017-09-03

## 2017-09-03 RX ORDER — IBUPROFEN 200 MG
16 TABLET ORAL
Status: DISCONTINUED | OUTPATIENT
Start: 2017-09-03 | End: 2017-09-06

## 2017-09-03 RX ORDER — INSULIN ASPART 100 [IU]/ML
1-15 INJECTION, SOLUTION INTRAVENOUS; SUBCUTANEOUS
Status: DISCONTINUED | OUTPATIENT
Start: 2017-09-03 | End: 2017-09-03

## 2017-09-03 RX ORDER — INSULIN ASPART 100 [IU]/ML
1-10 INJECTION, SOLUTION INTRAVENOUS; SUBCUTANEOUS
Status: DISCONTINUED | OUTPATIENT
Start: 2017-09-03 | End: 2017-09-05

## 2017-09-03 RX ORDER — GLUCAGON 1 MG
1 KIT INJECTION
Status: DISCONTINUED | OUTPATIENT
Start: 2017-09-03 | End: 2017-09-06

## 2017-09-03 RX ORDER — IBUPROFEN 200 MG
24 TABLET ORAL
Status: DISCONTINUED | OUTPATIENT
Start: 2017-09-03 | End: 2017-09-06

## 2017-09-03 RX ADMIN — INSULIN ASPART 8 UNITS: 100 INJECTION, SOLUTION INTRAVENOUS; SUBCUTANEOUS at 06:09

## 2017-09-03 RX ADMIN — INSULIN ASPART 6 UNITS: 100 INJECTION, SOLUTION INTRAVENOUS; SUBCUTANEOUS at 08:09

## 2017-09-03 RX ADMIN — SODIUM CHLORIDE: 0.9 INJECTION, SOLUTION INTRAVENOUS at 05:09

## 2017-09-03 RX ADMIN — INSULIN DETEMIR 10 UNITS: 100 INJECTION, SOLUTION SUBCUTANEOUS at 09:09

## 2017-09-03 RX ADMIN — INSULIN ASPART 8 UNITS: 100 INJECTION, SOLUTION INTRAVENOUS; SUBCUTANEOUS at 11:09

## 2017-09-03 RX ADMIN — OXYCODONE HYDROCHLORIDE 10 MG: 5 SOLUTION ORAL at 04:09

## 2017-09-03 RX ADMIN — Medication 3 ML: at 02:09

## 2017-09-03 RX ADMIN — ENOXAPARIN SODIUM 40 MG: 100 INJECTION SUBCUTANEOUS at 04:09

## 2017-09-03 RX ADMIN — INSULIN ASPART 4 UNITS: 100 INJECTION, SOLUTION INTRAVENOUS; SUBCUTANEOUS at 11:09

## 2017-09-03 RX ADMIN — INSULIN ASPART 8 UNITS: 100 INJECTION, SOLUTION INTRAVENOUS; SUBCUTANEOUS at 04:09

## 2017-09-03 RX ADMIN — INSULIN ASPART 2 UNITS: 100 INJECTION, SOLUTION INTRAVENOUS; SUBCUTANEOUS at 12:09

## 2017-09-03 RX ADMIN — OXYCODONE HYDROCHLORIDE 10 MG: 5 SOLUTION ORAL at 11:09

## 2017-09-03 RX ADMIN — Medication 3 ML: at 06:09

## 2017-09-03 NOTE — PROGRESS NOTES
[facility]  Otorhinolaryngology-Head & Neck Surgery  Progress Note    Subjective:     Post-Op Info:  Procedure(s) (LRB):  TRACHEOTOMY- AWAKE (N/A)  LARYNGOSCOPY (N/A)   3 Days Post-Op  Hospital Day: 5     Interval History: NAEON. Some issues with glucose control.    Medications:  Continuous Infusions:   sodium chloride 0.9% 55 mL/hr at 09/03/17 0543     Scheduled Meds:   enoxaparin  40 mg Subcutaneous Daily    insulin detemir  10 Units Subcutaneous Daily    sodium chloride 0.9%  3 mL Intravenous Q8H     PRN Meds:dextrose 50%, diphenhydrAMINE, fentaNYL, glucagon (human recombinant), glucose, glucose, hydrALAZINE, HYDROmorphone, insulin aspart, midazolam (PF), ondansetron, oxycodone, oxycodone, rocuronium     Review of patient's allergies indicates:  No Known Allergies  Objective:     Vital Signs (24h Range):  Temp:  [97.7 °F (36.5 °C)-98.7 °F (37.1 °C)] 97.7 °F (36.5 °C)  Pulse:  [] 97  Resp:  [18-46] 18  SpO2:  [95 %-99 %] 97 %  BP: (125-156)/(67-83) 148/81       Date 09/03/17 0700 - 09/04/17 0659   Shift 9980-1388 8898-6259 4026-1344 24 Hour Total   I  N  T  A  K  E   NG/   100    Shift Total  (mL/kg) 100  (1.6)   100  (1.6)   O  U  T  P  U  T   Urine  (mL/kg/hr) 300   300    Drains 120   120    Shift Total  (mL/kg) 420  (6.9)   420  (6.9)   Weight (kg) 61 61 61 61     Lines/Drains/Airways     Drain                 Gastrostomy/Enterostomy 08/31/17 1900 2 days          Airway                 Surgical Airway 08/31/17 0734 Shiley Cuffed 3 days          Peripheral Intravenous Line                 Peripheral IV - Single Lumen 08/30/17 2100 Left Wrist 3 days         Peripheral IV - Single Lumen 08/31/17 1100 Right Forearm 3 days                Physical Exam  NAD  No resp distress on humidified 02 via trach collar  6.0 DCT (deflated) sutured in place and secured with trach tie  Neck soft    Significant Labs:  ABGs:   Recent Labs  Lab 08/30/17  1812   PH 7.394   PCO2 60.7*   HCO3 37.1*    POCSATURATED 95   BE 12     CBC:   Recent Labs  Lab 09/03/17  0707   WBC 11.35   RBC 3.46*   HGB 10.7*   HCT 30.3*      MCV 88   MCH 30.9   MCHC 35.3     CMP:   Recent Labs  Lab 09/03/17  0707   *   CALCIUM 9.7   ALBUMIN 2.2*   PROT 6.4      K 3.9   CO2 31*   CL 96   BUN 24*   CREATININE 0.8   ALKPHOS 77   ALT 10   AST 13   BILITOT 0.3       Significant Diagnostics:  None    Assessment/Plan:     Squamous cell carcinoma of larynx    Mr. Esteban is a 46 yo M with DM and HTN who presents as transfer from [facility] with stridor, hoarseness, and known laryngeal mass with CT evidence of bulky laryngeal disease very concerning for malignancy. No immediate airway risk as patient has had slowly progressive stridor and hoarseness fo r past 2 months. Hypercapnea on ABG.     POD 3 s/p DLB and trach (ENT, kaelyn) and PEG (Gen Surg)  Frozen sections c/w invasive SCCa     CT chest with ill-defined pulm nodules, and 1 micronodule (3 mm)     PLAN:  Pan cultures per SICU for fever to 101.2 yesterday with NGTD, remains afebrile with no WBC  Routine trach care                   May change to cuffless POD 4-5  F/u permanent bx - invasive keratinizing poorly differentiated carcinoma       will likely need PET/CT as OP  Home anti-HTN meds       HemOnc and Radonc consulted while inpatient   Rad onc: Radiation treatment either as adjuvant to the laryngectomy or as definitive treatment with concurrent chemotherapy anticipated  Cont TF via PEG; Fu nutrition recs  OK for dental soft and nectar thicks, NO thin liquids  SSI (h/o DM2); added Detemir long acting for tighter BG control     DISPO: Consult SW for trach and PEG supplies. Discharge pending supplies and trach change.            Tavo Dale MD  Otorhinolaryngology-Head & Neck Surgery  [facility]

## 2017-09-03 NOTE — ASSESSMENT & PLAN NOTE
Mr. Esteban is a 46 yo M with DM and HTN who presents as transfer from [facility] with stridor, hoarseness, and known laryngeal mass with CT evidence of bulky laryngeal disease very concerning for malignancy. No immediate airway risk as patient has had slowly progressive stridor and hoarseness fo r past 2 months. Hypercapnea on ABG.     POD 3 s/p DLB and trach (ENT, kaelyn) and PEG (Gen Surg)  Frozen sections c/w invasive SCCa     CT chest with ill-defined pulm nodules, and 1 micronodule (3 mm)     PLAN:  Pan cultures per SICU for fever to 101.2 yesterday with NGTD, remains afebrile with no WBC  Routine trach care                   May change to cuffless POD 4-5  F/u permanent bx - invasive keratinizing poorly differentiated carcinoma       will likely need PET/CT as OP  Home anti-HTN meds       HemOnc and Radonc consulted while inpatient   Rad onc: Radiation treatment either as adjuvant to the laryngectomy or as definitive treatment with concurrent chemotherapy anticipated  Cont TF via PEG; Fu nutrition recs  OK for dental soft and nectar thicks, NO thin liquids  SSI (h/o DM2)     DISPO: Consult SW for trach and PEG supplies. Discharge pending supplies and trach change.

## 2017-09-03 NOTE — NURSING
Notified on call of increase in pts blood glucose. First reading >500, second reading 427. On call advised to hold pt.'s feeding and increase fluids to 75 until dinner. WCTM

## 2017-09-03 NOTE — PLAN OF CARE
Problem: Patient Care Overview  Goal: Plan of Care Review  Outcome: Ongoing (interventions implemented as appropriate)  Plan of care reviewed with pt. Pt AAOx's 4, vital signs stable. Few complaints of pain relieved with prn meds. Pt currently on a dental soft diet and tolerating well. Pt has 8L of O2 to trache collar. Tube feeds going at 45. Pt ambulates independently and remaIns free from falls. Bed in low and locked position, with call light in reach. NO acute events at this time. WCTM

## 2017-09-03 NOTE — SUBJECTIVE & OBJECTIVE
Interval History: NAEON.    Medications:  Continuous Infusions:   sodium chloride 0.9% 55 mL/hr at 09/03/17 0543     Scheduled Meds:   enoxaparin  40 mg Subcutaneous Daily    insulin detemir  10 Units Subcutaneous Daily    sodium chloride 0.9%  3 mL Intravenous Q8H     PRN Meds:dextrose 50%, diphenhydrAMINE, fentaNYL, glucagon (human recombinant), glucose, glucose, hydrALAZINE, HYDROmorphone, insulin aspart, midazolam (PF), ondansetron, oxycodone, oxycodone, rocuronium     Review of patient's allergies indicates:  No Known Allergies  Objective:     Vital Signs (24h Range):  Temp:  [97.7 °F (36.5 °C)-98.7 °F (37.1 °C)] 97.7 °F (36.5 °C)  Pulse:  [] 97  Resp:  [18-46] 18  SpO2:  [95 %-99 %] 97 %  BP: (125-156)/(67-83) 148/81       Date 09/03/17 0700 - 09/04/17 0659   Shift 3763-9052 7362-9538 9428-5553 24 Hour Total   I  N  T  A  K  E   NG/   100    Shift Total  (mL/kg) 100  (1.6)   100  (1.6)   O  U  T  P  U  T   Urine  (mL/kg/hr) 300   300    Drains 120   120    Shift Total  (mL/kg) 420  (6.9)   420  (6.9)   Weight (kg) 61 61 61 61     Lines/Drains/Airways     Drain                 Gastrostomy/Enterostomy 08/31/17 1900 2 days          Airway                 Surgical Airway 08/31/17 0734 Shiley Cuffed 3 days          Peripheral Intravenous Line                 Peripheral IV - Single Lumen 08/30/17 2100 Left Wrist 3 days         Peripheral IV - Single Lumen 08/31/17 1100 Right Forearm 3 days                Physical Exam  NAD  No resp distress on humidified 02 via trach collar  6.0 DCT (deflated) sutured in place and secured with trach tie  Neck soft    Significant Labs:  ABGs:   Recent Labs  Lab 08/30/17  1812   PH 7.394   PCO2 60.7*   HCO3 37.1*   POCSATURATED 95   BE 12     CBC:   Recent Labs  Lab 09/03/17  0707   WBC 11.35   RBC 3.46*   HGB 10.7*   HCT 30.3*      MCV 88   MCH 30.9   MCHC 35.3     CMP:   Recent Labs  Lab 09/03/17  0707   *   CALCIUM 9.7   ALBUMIN 2.2*   PROT 6.4   NA  136   K 3.9   CO2 31*   CL 96   BUN 24*   CREATININE 0.8   ALKPHOS 77   ALT 10   AST 13   BILITOT 0.3       Significant Diagnostics:  None

## 2017-09-03 NOTE — PLAN OF CARE
Problem: Patient Care Overview  Goal: Plan of Care Review  Outcome: Ongoing (interventions implemented as appropriate)  Plan of care reviewed, patient verbalizes understanding. AAOx4. VSS at this time. Pain managed with PRN pain meds throughout shift. Patient currently on trach collar at 8 L with thick secretions. O2 sats above 95%. Tube feeds at 45 ml/hr tolerating well, no complaints of N/V throughout shift. NS at 55 ml/hr for intake of 100 ml/hr. Blood sugar checks q4. Patient remains free of falls/injuries. Patient resting in bed with call light in reach. No acute distress at this time. Will continue to monitor.

## 2017-09-04 LAB
ALBUMIN SERPL BCP-MCNC: 2 G/DL
ALP SERPL-CCNC: 84 U/L
ALT SERPL W/O P-5'-P-CCNC: 11 U/L
ANION GAP SERPL CALC-SCNC: 12 MMOL/L
AST SERPL-CCNC: 12 U/L
BASOPHILS # BLD AUTO: 0 K/UL
BASOPHILS NFR BLD: 0 %
BILIRUB SERPL-MCNC: 0.5 MG/DL
BUN SERPL-MCNC: 26 MG/DL
CALCIUM SERPL-MCNC: 9.6 MG/DL
CHLORIDE SERPL-SCNC: 96 MMOL/L
CO2 SERPL-SCNC: 26 MMOL/L
CREAT SERPL-MCNC: 0.8 MG/DL
DIFFERENTIAL METHOD: ABNORMAL
EOSINOPHIL # BLD AUTO: 0 K/UL
EOSINOPHIL NFR BLD: 0.1 %
ERYTHROCYTE [DISTWIDTH] IN BLOOD BY AUTOMATED COUNT: 16.2 %
EST. GFR  (AFRICAN AMERICAN): >60 ML/MIN/1.73 M^2
EST. GFR  (NON AFRICAN AMERICAN): >60 ML/MIN/1.73 M^2
GLUCOSE SERPL-MCNC: 453 MG/DL
HCT VFR BLD AUTO: 29.8 %
HGB BLD-MCNC: 10.2 G/DL
LYMPHOCYTES # BLD AUTO: 0.4 K/UL
LYMPHOCYTES NFR BLD: 3.5 %
MCH RBC QN AUTO: 30.3 PG
MCHC RBC AUTO-ENTMCNC: 34.2 G/DL
MCV RBC AUTO: 88 FL
MONOCYTES # BLD AUTO: 0.3 K/UL
MONOCYTES NFR BLD: 2.4 %
NEUTROPHILS # BLD AUTO: 10.8 K/UL
NEUTROPHILS NFR BLD: 93.5 %
PLATELET # BLD AUTO: 220 K/UL
PMV BLD AUTO: 10.1 FL
POCT GLUCOSE: 177 MG/DL (ref 70–110)
POCT GLUCOSE: 260 MG/DL (ref 70–110)
POCT GLUCOSE: 326 MG/DL (ref 70–110)
POCT GLUCOSE: 375 MG/DL (ref 70–110)
POCT GLUCOSE: 70 MG/DL (ref 70–110)
POCT GLUCOSE: 71 MG/DL (ref 70–110)
POTASSIUM SERPL-SCNC: 4.1 MMOL/L
PROT SERPL-MCNC: 6.4 G/DL
RBC # BLD AUTO: 3.37 M/UL
SODIUM SERPL-SCNC: 134 MMOL/L
WBC # BLD AUTO: 11.55 K/UL

## 2017-09-04 PROCEDURE — 20600001 HC STEP DOWN PRIVATE ROOM

## 2017-09-04 PROCEDURE — 27000221 HC OXYGEN, UP TO 24 HOURS

## 2017-09-04 PROCEDURE — 36415 COLL VENOUS BLD VENIPUNCTURE: CPT

## 2017-09-04 PROCEDURE — 63600175 PHARM REV CODE 636 W HCPCS: Performed by: STUDENT IN AN ORGANIZED HEALTH CARE EDUCATION/TRAINING PROGRAM

## 2017-09-04 PROCEDURE — 63600175 PHARM REV CODE 636 W HCPCS: Performed by: INTERNAL MEDICINE

## 2017-09-04 PROCEDURE — 25000003 PHARM REV CODE 250: Performed by: INTERNAL MEDICINE

## 2017-09-04 PROCEDURE — 99222 1ST HOSP IP/OBS MODERATE 55: CPT | Mod: ,,, | Performed by: INTERNAL MEDICINE

## 2017-09-04 PROCEDURE — 25000003 PHARM REV CODE 250: Performed by: ANESTHESIOLOGY

## 2017-09-04 PROCEDURE — 25000003 PHARM REV CODE 250: Performed by: STUDENT IN AN ORGANIZED HEALTH CARE EDUCATION/TRAINING PROGRAM

## 2017-09-04 PROCEDURE — 63600175 PHARM REV CODE 636 W HCPCS

## 2017-09-04 PROCEDURE — 85025 COMPLETE CBC W/AUTO DIFF WBC: CPT

## 2017-09-04 PROCEDURE — 80053 COMPREHEN METABOLIC PANEL: CPT

## 2017-09-04 PROCEDURE — A4216 STERILE WATER/SALINE, 10 ML: HCPCS | Performed by: STUDENT IN AN ORGANIZED HEALTH CARE EDUCATION/TRAINING PROGRAM

## 2017-09-04 RX ORDER — INSULIN ASPART 100 [IU]/ML
10 INJECTION, SOLUTION INTRAVENOUS; SUBCUTANEOUS
Status: DISCONTINUED | OUTPATIENT
Start: 2017-09-04 | End: 2017-09-05

## 2017-09-04 RX ORDER — INSULIN ASPART 100 [IU]/ML
10 INJECTION, SOLUTION INTRAVENOUS; SUBCUTANEOUS
Status: DISCONTINUED | OUTPATIENT
Start: 2017-09-04 | End: 2017-09-04

## 2017-09-04 RX ORDER — CEFEPIME HYDROCHLORIDE 2 G/50ML
2 INJECTION, SOLUTION INTRAVENOUS
Status: DISCONTINUED | OUTPATIENT
Start: 2017-09-04 | End: 2017-09-05

## 2017-09-04 RX ORDER — INSULIN ASPART 100 [IU]/ML
10 INJECTION, SOLUTION INTRAVENOUS; SUBCUTANEOUS ONCE
Status: COMPLETED | OUTPATIENT
Start: 2017-09-04 | End: 2017-09-04

## 2017-09-04 RX ADMIN — INSULIN ASPART 10 UNITS: 100 INJECTION, SOLUTION INTRAVENOUS; SUBCUTANEOUS at 08:09

## 2017-09-04 RX ADMIN — INSULIN ASPART 10 UNITS: 100 INJECTION, SOLUTION INTRAVENOUS; SUBCUTANEOUS at 05:09

## 2017-09-04 RX ADMIN — Medication 3 ML: at 06:09

## 2017-09-04 RX ADMIN — ENOXAPARIN SODIUM 40 MG: 100 INJECTION SUBCUTANEOUS at 05:09

## 2017-09-04 RX ADMIN — CEFEPIME HYDROCHLORIDE 2 G: 2 INJECTION, SOLUTION INTRAVENOUS at 02:09

## 2017-09-04 RX ADMIN — INSULIN ASPART 10 UNITS: 100 INJECTION, SOLUTION INTRAVENOUS; SUBCUTANEOUS at 12:09

## 2017-09-04 RX ADMIN — Medication 3 ML: at 09:09

## 2017-09-04 RX ADMIN — OXYCODONE HYDROCHLORIDE 10 MG: 5 SOLUTION ORAL at 12:09

## 2017-09-04 RX ADMIN — CEFEPIME HYDROCHLORIDE 2 G: 2 INJECTION, SOLUTION INTRAVENOUS at 09:09

## 2017-09-04 RX ADMIN — SODIUM CHLORIDE 1.3 UNITS/HR: 9 INJECTION, SOLUTION INTRAVENOUS at 12:09

## 2017-09-04 RX ADMIN — INSULIN ASPART 10 UNITS: 100 INJECTION, SOLUTION INTRAVENOUS; SUBCUTANEOUS at 10:09

## 2017-09-04 RX ADMIN — OXYCODONE HYDROCHLORIDE 10 MG: 5 SOLUTION ORAL at 05:09

## 2017-09-04 RX ADMIN — OXYCODONE HYDROCHLORIDE 10 MG: 5 SOLUTION ORAL at 09:09

## 2017-09-04 RX ADMIN — Medication 3 ML: at 02:09

## 2017-09-04 RX ADMIN — INSULIN DETEMIR 15 UNITS: 100 INJECTION, SOLUTION SUBCUTANEOUS at 08:09

## 2017-09-04 RX ADMIN — SODIUM CHLORIDE: 0.9 INJECTION, SOLUTION INTRAVENOUS at 09:09

## 2017-09-04 NOTE — ASSESSMENT & PLAN NOTE
BG goal 140-180. Resume transition insulin drip at 1u/hr for basal needs.   Will start bolus feeds q6h. Cluster with mealtimes d/w RN.   Continue Novolog 10U for meals. Add 3 units for bolus feeds at mealtimes.   Add in Novolog 3U at MN for bolus feed.      BG AC, MN.     Discharge rec: Ongoing

## 2017-09-04 NOTE — HPI
Reason for Consult: Management of T2DM, Hyperglycemia     Surgical Procedure and Date: trach 8/30/17    Diabetes diagnosis year: 2009    Home Diabetes Medications:  lantus 20 units nightly, novolog sliding scale    How often checking glucose at home? 1-3 x day   BG readings on regimen: ?  Hypoglycemia on the regimen? ?  Missed doses on regimen?  ?    Diabetes Complications include:     Hyperglycemia and Diabetic peripheral neuropathy     Complicating diabetes co morbidities:   Active Cancer      HPI:   Patient is a 47 y.o. male with a diagnosis of DM type 2 who was transferred to [facility] on 8/30/17 for laryngeal mass.  S/p trach and peg on 8/30/17. Had frozen sections performed during this admission which suggested squamous cell carcinoma. ENT planning for laryngectomy following this hospitalization. Hyperglycemia during this admission and endocrine consulted for bg management.

## 2017-09-04 NOTE — SUBJECTIVE & OBJECTIVE
Interval History: NAEON. Remains with high BG levels in 300's despite detemir addition yesterday. Remains on diabetic soft dental diet and impact peptide TF's. Patient asymptomatic during episodes of hyperglycemia. A1C resulted at 9.    Medications:  Continuous Infusions:   sodium chloride 0.9% 55 mL/hr at 09/03/17 0543     Scheduled Meds:   enoxaparin  40 mg Subcutaneous Daily    insulin detemir  15 Units Subcutaneous Daily    insulin detemir  15 Units Subcutaneous QHS    sodium chloride 0.9%  3 mL Intravenous Q8H     PRN Meds:dextrose 50%, dextrose 50%, diphenhydrAMINE, fentaNYL, glucagon (human recombinant), glucose, glucose, hydrALAZINE, HYDROmorphone, insulin aspart, midazolam (PF), ondansetron, oxycodone, oxycodone, rocuronium     Review of patient's allergies indicates:  No Known Allergies  Objective:     Vital Signs (24h Range):  Temp:  [97.8 °F (36.6 °C)-99.2 °F (37.3 °C)] 99.2 °F (37.3 °C)  Pulse:  [] 98  Resp:  [16-22] 16  SpO2:  [94 %-98 %] 95 %  BP: (123-152)/(79-89) 137/79       Date 09/04/17 0700 - 09/05/17 0659   Shift 4700-6821 5657-8787 2717-0544 24 Hour Total   I  N  T  A  K  E   P.O. 100   100    I.V.  (mL/kg) 110  (1.8)   110  (1.8)    Shift Total  (mL/kg) 210  (3.4)   210  (3.4)   O  U  T  P  U  T   Urine  (mL/kg/hr) 1000   1000    Shift Total  (mL/kg) 1000  (16.4)   1000  (16.4)   Weight (kg) 61 61 61 61     Lines/Drains/Airways     Drain                 Gastrostomy/Enterostomy 08/31/17 1900 3 days          Airway                 Surgical Airway 08/31/17 0734 Shiley Cuffed 4 days          Peripheral Intravenous Line                 Peripheral IV - Single Lumen 08/30/17 2100 Left Wrist 4 days         Peripheral IV - Single Lumen 08/31/17 1100 Right Forearm 3 days                Physical Exam  NAD  No resp distress on humidified 02 via trach collar  6.0 DCT (deflated) sutured in place and secured with trach tie  Neck soft    Significant Labs:  ABGs:   Recent Labs  Lab 08/30/17  1817    PH 7.394   PCO2 60.7*   HCO3 37.1*   POCSATURATED 95   BE 12     CBC:   Recent Labs  Lab 09/04/17  0858   WBC 11.55   RBC 3.37*   HGB 10.2*   HCT 29.8*      MCV 88   MCH 30.3   MCHC 34.2     CMP:   Recent Labs  Lab 09/04/17  0858   *   CALCIUM 9.6   ALBUMIN 2.0*   PROT 6.4   *   K 4.1   CO2 26   CL 96   BUN 26*   CREATININE 0.8   ALKPHOS 84   ALT 11   AST 12   BILITOT 0.5       Significant Diagnostics:  None

## 2017-09-04 NOTE — PLAN OF CARE
Problem: Patient Care Overview  Goal: Plan of Care Review  Outcome: Ongoing (interventions implemented as appropriate)  No acute events overnight. Understands and agrees with plan of care. Alert and oriented when awake, uses gestures and mouths words to communicate needs. Slept well between care. #4 shiley trach sutured in place. Mepliex foam dressing in place as well at neck incision.Trach care per nursing and respiratory therapy. Hypertensive overnight, noted to be within patient's baseline since admission. Respirations even and unlabored, pulse ox >95% on 5L 28% via trach collar. Telemetry monitoring.  Tube feeding at goal of 45mL/hour per G tube. IVF at 55mL hour. Good oral intake as well. Pain controlled with PRN oxycodone per G tube. Up with stand-by assist. Received a bath before bed last night. Blood sugar checked before bed, SSI coverage given. Instructed to call for help as needed, call light in reach and answered in person. Bed in lowest position and brake set. Frequent rounds made for safety. See flowsheets for detailed assessment. Emergency trach equipment at bedside. White board in patient's room updated accordingly.

## 2017-09-04 NOTE — ASSESSMENT & PLAN NOTE
bg goal 140-180  Will start transition insulin gtt to better assess basal insulin needs  novolog 10 units ac with moderate dose correction  bg monitoring ac/hs    Discharge rec: on-going

## 2017-09-04 NOTE — PROGRESS NOTES
[facility]  Otorhinolaryngology-Head & Neck Surgery  Progress Note    Subjective:     Post-Op Info:  Procedure(s) (LRB):  TRACHEOTOMY- AWAKE (N/A)  LARYNGOSCOPY (N/A)   4 Days Post-Op  Hospital Day: 6     Interval History: NAEON. Remains with high BG levels in 300-400's despite detemir addition yesterday. Remains on diabetic soft dental diet and impact peptide TF's. Patient asymptomatic during episodes of hyperglycemia. A1C resulted at 9.    Medications:  Continuous Infusions:   sodium chloride 0.9% 55 mL/hr at 09/03/17 0543     Scheduled Meds:   enoxaparin  40 mg Subcutaneous Daily    insulin detemir  15 Units Subcutaneous Daily    insulin detemir  15 Units Subcutaneous QHS    sodium chloride 0.9%  3 mL Intravenous Q8H     PRN Meds:dextrose 50%, dextrose 50%, diphenhydrAMINE, fentaNYL, glucagon (human recombinant), glucose, glucose, hydrALAZINE, HYDROmorphone, insulin aspart, midazolam (PF), ondansetron, oxycodone, oxycodone, rocuronium     Review of patient's allergies indicates:  No Known Allergies  Objective:     Vital Signs (24h Range):  Temp:  [97.8 °F (36.6 °C)-99.2 °F (37.3 °C)] 99.2 °F (37.3 °C)  Pulse:  [] 98  Resp:  [16-22] 16  SpO2:  [94 %-98 %] 95 %  BP: (123-152)/(79-89) 137/79       Date 09/04/17 0700 - 09/05/17 0659   Shift 9243-9230 9787-1742 2282-2582 24 Hour Total   I  N  T  A  K  E   P.O. 100   100    I.V.  (mL/kg) 110  (1.8)   110  (1.8)    Shift Total  (mL/kg) 210  (3.4)   210  (3.4)   O  U  T  P  U  T   Urine  (mL/kg/hr) 1000   1000    Shift Total  (mL/kg) 1000  (16.4)   1000  (16.4)   Weight (kg) 61 61 61 61     Lines/Drains/Airways     Drain                 Gastrostomy/Enterostomy 08/31/17 1900 3 days          Airway                 Surgical Airway 08/31/17 0734 Shiley Cuffed 4 days          Peripheral Intravenous Line                 Peripheral IV - Single Lumen 08/30/17 2100 Left Wrist 4 days         Peripheral IV - Single Lumen 08/31/17 1100 Right Forearm 3  days                Physical Exam  NAD  No resp distress on humidified 02 via trach collar  6.0 DCT (deflated) sutured in place and secured with trach tie  Neck soft    Significant Labs:  ABGs:   Recent Labs  Lab 08/30/17  1812   PH 7.394   PCO2 60.7*   HCO3 37.1*   POCSATURATED 95   BE 12     CBC:   Recent Labs  Lab 09/04/17  0858   WBC 11.55   RBC 3.37*   HGB 10.2*   HCT 29.8*      MCV 88   MCH 30.3   MCHC 34.2     CMP:   Recent Labs  Lab 09/04/17  0858   *   CALCIUM 9.6   ALBUMIN 2.0*   PROT 6.4   *   K 4.1   CO2 26   CL 96   BUN 26*   CREATININE 0.8   ALKPHOS 84   ALT 11   AST 12   BILITOT 0.5       Significant Diagnostics:  None    Assessment/Plan:     Squamous cell carcinoma of larynx    Mr. Esteban is a 46 yo M with DM and HTN who presents as transfer from [facility] with stridor, hoarseness, and known laryngeal mass with CT evidence of bulky laryngeal disease very concerning for malignancy. No immediate airway risk as patient has had slowly progressive stridor and hoarseness fo r past 2 months. Hypercapnea on ABG.     POD 4 s/p DLB and trach (ENT, kaelyn) and PEG (Gen Surg)  Frozen sections c/w invasive SCCa     CT chest with ill-defined pulm nodules, and 1 micronodule (3 mm)     PLAN:  Pan cultures per SICU for fever to 101.2 09/01 with presumptive pseudomonas on respiratory cx; all others NGTD. Pt remains afebrile with no WBC. Will consider monotherapy coverage pending CXR.  Routine trach care                    Change to cuffless POD 5  Permanent bx - invasive keratinizing poorly differentiated carcinoma       will likely need PET/CT as OP  Home anti-HTN meds       HemOnc and Radonc consulted while inpatient   Rad onc: Radiation treatment either as adjuvant to the laryngectomy or as definitive treatment with concurrent chemotherapy anticipated  Cont TF via PEG; Fu nutrition recs  OK for diabetic dental soft and nectar thicks, NO thin liquids or orange juice/similar  juices with high sugar content  SSI (h/o DM2); added Detemir BID as well as 1x Aspart dose this AM for BG control; remains on aspart TID with meals. Will consult endocrine for further recs and home insulin     DISPO: Consult SW for trach and PEG supplies. Discharge pending supplies and trach change.            Tavo Dale MD  Otorhinolaryngology-Head & Neck Surgery  [facility]

## 2017-09-04 NOTE — PLAN OF CARE
Problem: Patient Care Overview  Goal: Plan of Care Review  Outcome: Ongoing (interventions implemented as appropriate)  Plan of care reviewed with pt. Pt AAOx's 4, vital signs stable. Pt uses gestures and pen and paper to communicate. Trache with a #4shiley in place with 02 at 8L. Tube feeds running contiuosly at 45ml/hr.Pt on a diabetic dental soft diet and tolerating well. No complaints of pain. Instructed pt to call nurse for any assistance out of bed. Bed in low and locked position with call light in reach. No acute events at this time. WCTM

## 2017-09-04 NOTE — SUBJECTIVE & OBJECTIVE
Interval HPI:   Overnight events: bg uncontrolled  Eatin%  Nausea: No  Hypoglycemia and intervention: No  Fever: No  TPN and/or TF: yes  If yes, type of TF/TPN and rate: 15cc/hr    PMH, PSH, FH, SH updated and reviewed     Review of Systems   Constitutional: Negative for weight changes.  Eyes: Negative for visual disturbance.  Respiratory: Negative for cough.   Cardiovascular: Negative for chest pain.  Gastrointestinal: Negative for nausea.  Endocrine: Negative for polyuria, polydipsia.  Musculoskeletal: Negative for back pain.  Skin: Negative for rash.  Neurological: Negative for syncope.  Psychiatric/Behavioral: Negative for depression.      PHYSICAL EXAMINATION:  Vitals:    17 0736   BP: 137/79   Pulse: 98   Resp: 16   Temp: 99.2 °F (37.3 °C)     Body mass index is 18.76 kg/m².    Physical Exam   Constitutional:  Well developed, well nourished, NAD.  ENT: trach present; External ears no masses with nose patent; normal hearing.   Neck:  Supple; trachea midline; no thyromegaly.   Cardiovascular: Normal heart sounds, no LE edema.     Lungs:  Normal effort; lungs anterior bilaterally clear to auscultation.  Abdomen: peg present; Soft, no masses,  no hernias.  MS: No clubbing or cyanosis of nails noted; unable to assess gait.  Skin: No rashes, lesions, or ulcers; no nodules.  Psychiatric: Good judgement and insight; normal mood and affect.  Neurological: Cranial nerves are grossly intact. decreased vibration sense in the bilateral lower extremities.

## 2017-09-04 NOTE — ASSESSMENT & PLAN NOTE
Mr. Esteban is a 48 yo M with DM and HTN who presents as transfer from [facility] with stridor, hoarseness, and known laryngeal mass with CT evidence of bulky laryngeal disease very concerning for malignancy. No immediate airway risk as patient has had slowly progressive stridor and hoarseness fo r past 2 months. Hypercapnea on ABG.     POD 4 s/p DLB and trach (ENT, kaelyn) and PEG (Gen Surg)  Frozen sections c/w invasive SCCa     CT chest with ill-defined pulm nodules, and 1 micronodule (3 mm)     PLAN:  Pan cultures per SICU for fever to 101.2 09/01 with presumptive pseudomonas on respiratory cx; all others NGTD. Pt remains afebrile with no WBC. Will consider monotherapy coverage pending CXR.  Routine trach care                   Change to cuffless POD 5  Permanent bx - invasive keratinizing poorly differentiated carcinoma       will likely need PET/CT as OP  Home anti-HTN meds       HemOnc and Radonc consulted while inpatient   Rad onc: Radiation treatment either as adjuvant to the laryngectomy or as definitive treatment with concurrent chemotherapy anticipated  Cont TF via PEG; Fu nutrition recs  OK for diabetic dental soft and nectar thicks, NO thin liquids or orange juice/similar juices with high sugar content  SSI (h/o DM2); added Detemir BID as well as 1x Aspart dose this AM for BG control; remains on aspart TID with meals. Will consult endocrine for further recs and home insulin     DISPO: Consult SW for trach and PEG supplies. Discharge pending supplies and trach change.

## 2017-09-04 NOTE — CONSULTS
[facility]  Endocrinology  Diabetes Consult Note    Consult Requested by: Mk Deleon MD   Reason for admit: Laryngeal mass    Reason for Consult: Management of T2DM, Hyperglycemia     Surgical Procedure and Date: trach 17    Diabetes diagnosis year:     Home Diabetes Medications:  lantus 20 units nightly, novolog sliding scale    How often checking glucose at home? 1-3 x day   BG readings on regimen: ?  Hypoglycemia on the regimen? ?  Missed doses on regimen?  ?    Diabetes Complications include:     Hyperglycemia and Diabetic peripheral neuropathy     Complicating diabetes co morbidities:   Active Cancer      HPI:   Patient is a 47 y.o. male with a diagnosis of DM type 2 who was transferred to [facility] on 17 for laryngeal mass.  S/p trach and peg on 17. Had frozen sections performed during this admission which suggested squamous cell carcinoma. ENT planning for laryngectomy following this hospitalization. Hyperglycemia during this admission and endocrine consulted for bg management.     Interval HPI:   Overnight events: bg uncontrolled  Eatin%  Nausea: No  Hypoglycemia and intervention: No  Fever: No  TPN and/or TF: yes  If yes, type of TF/TPN and rate: 15cc/hr    PMH, PSH, FH, SH updated and reviewed     Review of Systems   Constitutional: Negative for weight changes.  Eyes: Negative for visual disturbance.  Respiratory: Negative for cough.   Cardiovascular: Negative for chest pain.  Gastrointestinal: Negative for nausea.  Endocrine: Negative for polyuria, polydipsia.  Musculoskeletal: Negative for back pain.  Skin: Negative for rash.  Neurological: Negative for syncope.  Psychiatric/Behavioral: Negative for depression.      PHYSICAL EXAMINATION:  Vitals:    17 0736   BP: 137/79   Pulse: 98   Resp: 16   Temp: 99.2 °F (37.3 °C)     Body mass index is 18.76 kg/m².    Physical Exam   Constitutional:  Well developed, well nourished, NAD.  ENT: trach present;  External ears no masses with nose patent; normal hearing.   Neck:  Supple; trachea midline; no thyromegaly.   Cardiovascular: Normal heart sounds, no LE edema.     Lungs:  Normal effort; lungs anterior bilaterally clear to auscultation.  Abdomen: peg present; Soft, no masses,  no hernias.  MS: No clubbing or cyanosis of nails noted; unable to assess gait.  Skin: No rashes, lesions, or ulcers; no nodules.  Psychiatric: Good judgement and insight; normal mood and affect.  Neurological: Cranial nerves are grossly intact. decreased vibration sense in the bilateral lower extremities.      Labs Reviewed and Include     Recent Labs  Lab 09/04/17  0858   *   CALCIUM 9.6   ALBUMIN 2.0*   PROT 6.4   *   K 4.1   CO2 26   CL 96   BUN 26*   CREATININE 0.8   ALKPHOS 84   ALT 11   AST 12   BILITOT 0.5     Lab Results   Component Value Date    WBC 11.55 09/04/2017    HGB 10.2 (L) 09/04/2017    HCT 29.8 (L) 09/04/2017    MCV 88 09/04/2017     09/04/2017     No results for input(s): TSH, FREET4 in the last 168 hours.  Lab Results   Component Value Date    HGBA1C 9.6 (H) 09/03/2017       Nutritional status:   Body mass index is 18.76 kg/m².  Lab Results   Component Value Date    ALBUMIN 2.0 (L) 09/04/2017    ALBUMIN 2.2 (L) 09/03/2017    ALBUMIN 2.4 (L) 09/02/2017     No results found for: PREALBUMIN    Estimated Creatinine Clearance: 97.4 mL/min (based on SCr of 0.8 mg/dL).    Accu-Checks  Recent Labs      09/02/17   2032  09/03/17   0034  09/03/17   0406  09/03/17   0838  09/03/17   1127  09/03/17   1550  09/03/17   1751  09/03/17   2335  09/04/17   0831  09/04/17   1143   POCTGLUCOSE  287*  220*  307*  260*  342*  427*  323*  326*  375*  260*        ASSESSMENT and PLAN    DM (diabetes mellitus), type 2, uncontrolled    bg goal 140-180, bg remains above goal receiving tube feeds and po diet  Will start transition insulin gtt to better assess basal insulin needs  novolog 10 units ac with moderate dose  correction  bg monitoring ac/hs    Discharge rec: on-going          Squamous cell carcinoma of larynx    Management per ent with plans for laryngectomy                 Theodore Garcia MD  Endocrinology  [facility]

## 2017-09-05 PROBLEM — J15.1 PNEUMONIA DUE TO PSEUDOMONAS SPECIES: Status: ACTIVE | Noted: 2017-09-05

## 2017-09-05 PROBLEM — Z78.9 ON ENTERAL NUTRITION: Status: ACTIVE | Noted: 2017-09-05

## 2017-09-05 PROBLEM — C32.9 SQUAMOUS CELL CARCINOMA OF LARYNX: Status: ACTIVE | Noted: 2017-09-05

## 2017-09-05 LAB
BACTERIA SPEC AEROBE CULT: NORMAL
GRAM STN SPEC: NORMAL
POCT GLUCOSE: 109 MG/DL (ref 70–110)
POCT GLUCOSE: 158 MG/DL (ref 70–110)
POCT GLUCOSE: 172 MG/DL (ref 70–110)
POCT GLUCOSE: 357 MG/DL (ref 70–110)
POCT GLUCOSE: 378 MG/DL (ref 70–110)
POCT GLUCOSE: >500 MG/DL (ref 70–110)

## 2017-09-05 PROCEDURE — 92526 ORAL FUNCTION THERAPY: CPT

## 2017-09-05 PROCEDURE — 92597 ORAL SPEECH DEVICE EVAL: CPT

## 2017-09-05 PROCEDURE — 99900022

## 2017-09-05 PROCEDURE — A4216 STERILE WATER/SALINE, 10 ML: HCPCS | Performed by: STUDENT IN AN ORGANIZED HEALTH CARE EDUCATION/TRAINING PROGRAM

## 2017-09-05 PROCEDURE — 20600001 HC STEP DOWN PRIVATE ROOM

## 2017-09-05 PROCEDURE — 99233 SBSQ HOSP IP/OBS HIGH 50: CPT | Mod: ,,, | Performed by: HOSPITALIST

## 2017-09-05 PROCEDURE — 25000003 PHARM REV CODE 250: Performed by: STUDENT IN AN ORGANIZED HEALTH CARE EDUCATION/TRAINING PROGRAM

## 2017-09-05 PROCEDURE — 63600175 PHARM REV CODE 636 W HCPCS: Performed by: INTERNAL MEDICINE

## 2017-09-05 PROCEDURE — 25000003 PHARM REV CODE 250

## 2017-09-05 PROCEDURE — 27000221 HC OXYGEN, UP TO 24 HOURS

## 2017-09-05 PROCEDURE — 63600175 PHARM REV CODE 636 W HCPCS: Performed by: NURSE PRACTITIONER

## 2017-09-05 PROCEDURE — 25000003 PHARM REV CODE 250: Performed by: ANESTHESIOLOGY

## 2017-09-05 PROCEDURE — 63600175 PHARM REV CODE 636 W HCPCS

## 2017-09-05 PROCEDURE — 94761 N-INVAS EAR/PLS OXIMETRY MLT: CPT

## 2017-09-05 PROCEDURE — G9171 VOICE CURRENT STATUS: HCPCS | Mod: CL

## 2017-09-05 PROCEDURE — 63600175 PHARM REV CODE 636 W HCPCS: Performed by: STUDENT IN AN ORGANIZED HEALTH CARE EDUCATION/TRAINING PROGRAM

## 2017-09-05 PROCEDURE — 99232 SBSQ HOSP IP/OBS MODERATE 35: CPT | Mod: ,,, | Performed by: NURSE PRACTITIONER

## 2017-09-05 PROCEDURE — 25000003 PHARM REV CODE 250: Performed by: INTERNAL MEDICINE

## 2017-09-05 PROCEDURE — G9172 VOICE GOAL STATUS: HCPCS | Mod: CK

## 2017-09-05 PROCEDURE — 99900026 HC AIRWAY MAINTENANCE (STAT)

## 2017-09-05 PROCEDURE — 99900035 HC TECH TIME PER 15 MIN (STAT)

## 2017-09-05 RX ORDER — INSULIN ASPART 100 [IU]/ML
10-13 INJECTION, SOLUTION INTRAVENOUS; SUBCUTANEOUS
Status: DISCONTINUED | OUTPATIENT
Start: 2017-09-05 | End: 2017-09-06

## 2017-09-05 RX ORDER — MOXIFLOXACIN HYDROCHLORIDE 400 MG/1
400 TABLET ORAL DAILY
Status: DISCONTINUED | OUTPATIENT
Start: 2017-09-05 | End: 2017-09-06

## 2017-09-05 RX ORDER — INSULIN ASPART 100 [IU]/ML
0-5 INJECTION, SOLUTION INTRAVENOUS; SUBCUTANEOUS
Status: DISCONTINUED | OUTPATIENT
Start: 2017-09-05 | End: 2017-09-06

## 2017-09-05 RX ADMIN — OXYCODONE HYDROCHLORIDE 10 MG: 5 SOLUTION ORAL at 12:09

## 2017-09-05 RX ADMIN — SODIUM CHLORIDE 1 UNITS/HR: 9 INJECTION, SOLUTION INTRAVENOUS at 11:09

## 2017-09-05 RX ADMIN — OXYCODONE HYDROCHLORIDE 10 MG: 5 SOLUTION ORAL at 09:09

## 2017-09-05 RX ADMIN — INSULIN ASPART 5 UNITS: 100 INJECTION, SOLUTION INTRAVENOUS; SUBCUTANEOUS at 12:09

## 2017-09-05 RX ADMIN — Medication 3 ML: at 02:09

## 2017-09-05 RX ADMIN — Medication 3 ML: at 05:09

## 2017-09-05 RX ADMIN — CEFEPIME HYDROCHLORIDE 2 G: 2 INJECTION, SOLUTION INTRAVENOUS at 05:09

## 2017-09-05 RX ADMIN — Medication 3 ML: at 10:09

## 2017-09-05 RX ADMIN — OXYCODONE HYDROCHLORIDE 10 MG: 5 SOLUTION ORAL at 07:09

## 2017-09-05 RX ADMIN — ENOXAPARIN SODIUM 40 MG: 100 INJECTION SUBCUTANEOUS at 05:09

## 2017-09-05 RX ADMIN — SODIUM CHLORIDE: 0.9 INJECTION, SOLUTION INTRAVENOUS at 05:09

## 2017-09-05 RX ADMIN — INSULIN ASPART 10 UNITS: 100 INJECTION, SOLUTION INTRAVENOUS; SUBCUTANEOUS at 09:09

## 2017-09-05 RX ADMIN — OXYCODONE HYDROCHLORIDE 10 MG: 5 SOLUTION ORAL at 05:09

## 2017-09-05 RX ADMIN — MOXIFLOXACIN HYDROCHLORIDE 400 MG: 400 TABLET, FILM COATED ORAL at 12:09

## 2017-09-05 RX ADMIN — INSULIN ASPART 13 UNITS: 100 INJECTION, SOLUTION INTRAVENOUS; SUBCUTANEOUS at 07:09

## 2017-09-05 RX ADMIN — INSULIN ASPART 10 UNITS: 100 INJECTION, SOLUTION INTRAVENOUS; SUBCUTANEOUS at 12:09

## 2017-09-05 NOTE — PT/OT/SLP EVAL
Speech Language Pathology Evaluation  PMSV    Luís Esteban   MRN: [id number]   Admitting Diagnosis: Laryngeal mass    Diet recommendations: Dental soft diet/Nectar thick liquids  · Slow rate  · NO straws  · Upright for PO intake  · Small bites/sips  · Multiple swallows  · Awake/alert for PO intake  · Assistance thickening liquids  · To achieve nectar thick liquid: 4 oz of any liquid to 1 pack of ThickenUp Clear or 6 oz of any liquid to 1 pack of ThickenUp  · Avoid mixed consistencies (soup, cereal with milk, juicy fruits).  · No ice cream, jello, or ice.    PMSV Precautions:  Do not sleep with valve on  Always eat with valve on  Wash with soap and water    SLP Treatment Date: 09/05/17  Speech Start Time: 1319     Speech Stop Time: 1336     Speech Total (min): 17 min       TREATMENT BILLABLE MINUTES:  Treatment Swallowing Dysfunction 9 and Evaluation Use/Fit Voiced Prosthetic 8    Diagnosis: Laryngeal mass      Past Medical History:   Diagnosis Date    Insulin dependent diabetes mellitus      History reviewed. No pertinent surgical history.    Has the patient been evaluated by SLP for swallowing? : Yes  Keep patient NPO?: No   General Precautions: Standard, aspiration, fall, nectar thick                  Subjective:  Pt awake; pleasant  Patient goals: not stated    Pain/Comfort  Pain Rating 1: 0/10  Pain Rating Post-Intervention 1: 0/10     Objective:        Oral Musculature Evaluation  Oral Musculature: WFL  Dentition: present and adequate  Mandibular Strength and Mobility: WFL  Oral Labial Strength and Mobility: WFL  Lingual Strength and Mobility: WFL  Voice Prior to PO Intake: Hoarse/breathy with finger occlusion of trach                   [place] Speaking Valve  Trach size/type: 6, cuffless  Able to phonate around trach: yes, very breathy  Vocal quality with digital finger occlusion: very breathy  O2 sats at rest: 96%  O2 sats with PMSV in place: 96%  Vocal quality with valve in place: very breathy  Back  pressure upon removal: none  Minutes PMSV worn: approx 15 consecutive minutes  Education topics addressed: cleaning valve, one-way technology, anatomy of trach, precautions, removal of valve prior to sleeping    Pt taughtback all PMSV precautions described above. Pt applied and removed PMSV x1 each provided min cues. PMSV remained in place at end of session.     Pt denied knowledge on how to thicken liquids or the importance of thickening; stated has been thickening for each meal with unknown liquid/thickener ratio. SKilled education provided to pt on the importance of thickening liquids, aspiration precautions, cause of dysphagia, and how to thicken liquids; pt taughtback indep. Measuring cup provided to BS. Pt tolerated 3 cup sips nectar thick liquid without s/s of airway compromise. Oral phase WNL. Whiteboard updated with diet recommendations/aspiration precautions. No further questions.                    Assessment:  Luís Esteban is a 47 y.o. male with a SLP diagnosis of Dysphagia and S/P Trach, PMSV Training. He present with continued progress towards goals.  .    Do you have any cultural, spiritual, L[employer] day conflicts, given your current situation?: NO    Discharge recommendations: Discharge Facility/Level Of Care Needs: home health speech therapy     Goals:    SLP Goals        Problem: SLP Goal    Goal Priority Disciplines Outcome   SLP Goal     SLP    Description:  Speech Language Pathology Goals  Goals expected to be met by 9/8    1. Pt will tolerate nectar thick liquids/dental soft diet without overt s/s of aspiration. ONGOING  2. Pt will tolerate solids x10 without overt s/s of aspiration ONGOING    Speech Language Pathology Goals  Goals expected to be met by 9/12   1. Pt will tolerate nectar thick liquids/dental soft diet without overt s/s of aspiration.   2. Pt will tolerate solids x10 without overt s/s of aspiration   3. Pt will tolerate PMSV during all awake hours without s/s of distress to  improve respiration, phonation, and communication.   4. Pt will recall 3/3 PMSV precautions provided min cues to facilitate desensitization and independence.                        Plan:   Patient to be seen Therapy Frequency: 4 x/week   Plan of Care expires: 09/29/17  Plan of Care reviewed with: patient  SLP Follow-up?: Yes            Deja Tellez M.A. CCC-SLP  Speech Language Pathologist  [phone]  9/5/2017

## 2017-09-05 NOTE — PROGRESS NOTES
"[facility]  Endocrinology  Progress Note    Admit Date: 8/30/2017     Reason for Consult: Management of T2DM, Hyperglycemia     Surgical Procedure and Date: trach 8/30/17    Diabetes diagnosis year: 2009    Home Diabetes Medications:  lantus 20 units nightly, novolog sliding scale    How often checking glucose at home? 1-3 x day   BG readings on regimen: ?  Hypoglycemia on the regimen? ?  Missed doses on regimen?  ?    Diabetes Complications include:     Hyperglycemia and Diabetic peripheral neuropathy     Complicating diabetes co morbidities:   Active Cancer      HPI:   Patient is a 47 y.o. male with a diagnosis of DM type 2 who was transferred to [facility] on 8/30/17 for laryngeal mass.  S/p trach and peg on 8/30/17. Had frozen sections performed during this admission which suggested squamous cell carcinoma. ENT planning for laryngectomy following this hospitalization. Hyperglycemia during this admission and endocrine consulted for bg management.         Interval HPI:   Bg trended toward goal with transition insulin drip and Novolog dose increases. Then insulin drip suspended for BG 70 and BGs have trended back up and are uncontrolled again. No hypoglycemia. Tolerating little diet today though he did eat cereal prior to 1130AM spot check. Will start bolus feeds four times daily this afternoon. Low grade fever.    /83 (BP Location: Left arm, Patient Position: Lying)   Pulse 83   Temp 99.1 °F (37.3 °C) (Oral)   Resp 18   Ht 5' 11" (1.803 m)   Wt 61 kg (134 lb 7.7 oz)   SpO2 98%   BMI 18.76 kg/m²       Labs Reviewed and Include    No results for input(s): GLU, CALCIUM, ALBUMIN, PROT, NA, K, CO2, CL, BUN, CREATININE, ALKPHOS, ALT, AST, BILITOT in the last 24 hours.  Lab Results   Component Value Date    WBC 11.55 09/04/2017    HGB 10.2 (L) 09/04/2017    HCT 29.8 (L) 09/04/2017    MCV 88 09/04/2017     09/04/2017     No results for input(s): TSH, FREET4 in the last 168 hours.  Lab " Results   Component Value Date    HGBA1C 9.6 (H) 09/03/2017     Nutritional status:   Body mass index is 18.76 kg/m².  Lab Results   Component Value Date    ALBUMIN 2.0 (L) 09/04/2017    ALBUMIN 2.2 (L) 09/03/2017    ALBUMIN 2.4 (L) 09/02/2017     No results found for: PREALBUMIN    Estimated Creatinine Clearance: 97.4 mL/min (based on SCr of 0.8 mg/dL).    Accu-Checks  Recent Labs      09/03/17   1536  09/03/17   1550  09/03/17   1751  09/03/17   2335  09/04/17   0831  09/04/17   1143  09/04/17   1702  09/04/17   2141  09/04/17   2253  09/05/17   0222   POCTGLUCOSE  >500*  427*  323*  326*  375*  260*  177*  70  71  158*       Current Medications and/or Treatments Impacting Glycemic Control  Immunotherapy:    Immunosuppressants     None        Steroids:   Hormones     None        Pressors:    Autonomic Drugs     Start     Stop Route Frequency Ordered    08/31/17 1123  rocuronium injection 50 mg      -- IV On Call Procedure 08/31/17 1123        Hyperglycemia/Diabetes Medications:   Antihyperglycemics     Start     Stop Route Frequency Ordered    09/04/17 1245  insulin regular (Humulin R) 100 Units in sodium chloride 0.9% 100 mL infusion      -- IV Continuous 09/04/17 1143    09/04/17 1145  insulin aspart pen 10 Units      -- SubQ 3 times daily with meals 09/04/17 1143    09/03/17 1750  insulin aspart pen 1-10 Units      -- SubQ Before meals & nightly PRN 09/03/17 1651          ASSESSMENT and PLAN    DM (diabetes mellitus), type 2, uncontrolled    BG goal 140-180. Resume transition insulin drip at 1u/hr for basal needs.   Will start bolus feeds q6h. Cluster with mealtimes d/w RN.   Continue Novolog 10U for meals. Add 3 units for bolus feeds at mealtimes.   Add in Novolog 3U at MN for bolus feed.      BG AC, MN.     Discharge rec: Ongoing            Squamous cell carcinoma of larynx    S/p laryngectomy           On enteral nutrition    Bolus feeds.   May increase insulin needs.              Caren Brennan  FNP  Endocrinology  [facility]

## 2017-09-05 NOTE — HPI
Mr. Esteban is a 48 yo M with DM and HTN who presents as transfer from [facility] with stridor, hoarseness, and known laryngeal mass with CT evidence of bulky laryngeal disease very concerning for malignancy. No immediate airway risk as patient has had slowly progressive stridor and hoarseness fo r past 2 months. Patient was evaluated by ENT,  s/p laryngectomy and biopsy by Heme/Onc that revealed invasive squamous cell carcinoma state T4a, No, Mo. Tracheostomy tube in place, pending recommendation for out-patient treatment plan from heme/onc and radiology. Medicine is consulted for PNA management as patient's respiratory culture grew pseudomonas and chest xray revealed right lobe consolidation since 09/04. Currently on cefepime 2g Q8H.

## 2017-09-05 NOTE — ASSESSMENT & PLAN NOTE
Nutrition Problem:  Inadequate oral intake    Related to (etiology):   Laryngeal tumor    Signs and Symptoms (as evidenced by):   NPO w/ need for TF to meet nutr needs    Interventions/Recommendations (treatment strategy):  See recs    Nutrition Diagnosis Status:   Improving

## 2017-09-05 NOTE — PLAN OF CARE
Patient is POD #6 from trach and peg, transferred from ICU to [facility] 9/2/2017.  Patient is expected to discharge home with trach supplies and tube feeds tomorrow.  Will continue to follow for needs.    PCP  KATRINA Mandujano  [address] / [place] [address] [phone]      [facility] ([place]) - [place], [place] - [address]  [address] [facility] [place] [address]  Phone: [phone] Fax: [phone]      Extended Emergency Contact Information  Primary Emergency Contact: Lisy Grant   [facility]  Home Phone: [phone]  Relation: Sister         09/05/17 1624   Discharge Reassessment   Assessment Type Discharge Planning Reassessment   Discharge Plan A Home

## 2017-09-05 NOTE — PLAN OF CARE
Problem: Patient Care Overview  Goal: Plan of Care Review  Outcome: Ongoing (interventions implemented as appropriate)  POC reviewed with patient who verbalized understanding. AAOX4. Pt O2 sats stable on 5L 28% trach collar. G tube intact and patent. Pt tolerating diet well and bolus TFs well. Left wrist IV intact and patent. Right FA IV intact and patent. PT voiding via urinal with adequate output. Pt on tele running NSR in the 80s. BG being checked ACHS. Pt on insulin drip @ 1ml/hr. Last BG check was 109. Pt worked well with therapy today. Pain being controlled with PRN pain medication. Pt remained free from falls throughout the shift. VSS. Will continue to monitor.

## 2017-09-05 NOTE — ASSESSMENT & PLAN NOTE
Mr. Esteban is a 48 yo M with DM and HTN who presents as transfer from [facility] with stridor, hoarseness, and known laryngeal mass with CT evidence of bulky laryngeal disease very concerning for malignancy. No immediate airway risk as patient has had slowly progressive stridor and hoarseness fo r past 2 months. Hypercapnea on ABG.     POD 5 s/p DLB and trach (ENT, Pancho) and bedside PEG (Gen Surg, Goldberg)  Frozen sections c/w invasive SCCa     CT chest with ill-defined pulm nodules, and 1 micronodule (3 mm)     PLAN:  Pneumonia   CXR pending today   pseudomonas on respiratory cx : pan-sensitive   Continue cefipime for now, defer to IM (consulted) for outpatient ABX recs  S/p Tracheostomy   Now with 6.0 DCFS   Consult SLP for PMSV  Laryngeal Cancer   Path: invasive keratinizing poorly differentiated carcinoma   HemOnc and Radonc consulted while inpatient    Radtx as adjuvant to the laryngectomy, or radtx as definitive treatment with concurrent chemotherapy   will likely need PET/CT as OP    CV   Home anti-HTN meds       FEN/GI   Cont TF (Impact Peptide 1.5) via PEG   Transition to bolus (Fu nutrition recs)   OK for diabetic dental soft and nectar thicks per SLP (MBSS 8/31/17)   NO thin liquids or orange juice/similar juices with high sugar content  ENDO   SSI (h/o DM2); Endocrine consulted, adjusted meds with correction of BG     DISPO:    Consult SW for trach and PEG supplies.    Discharge pending supplies, and resolution/contro of hyperglycemia (Endo recs) and Pneumonia (IM recs)

## 2017-09-05 NOTE — PLAN OF CARE
SMILEY learned in morning huddle from ENT NP, Radha Oneil, that Pt could potentially be ready to discharge home today or tomorrow and would need trach supplies and tube feeds. NP stated she placed orders for trach supplies, but that she needed to speak to Nutrition to get final recs for Pt's home tube feeds. SMILEY left message for Melany with [facility] requesting a return call ([phone]) regarding Pt's trach supply orders that were placed this morning. SMILEY to continue to follow.     Shirley Sierra, DEYANIRAW

## 2017-09-05 NOTE — SUBJECTIVE & OBJECTIVE
"Interval HPI:   Bg trended toward goal with transition insulin drip and Novolog dose increases. Then insulin drip suspended for BG 70 and BGs have trended back up and are uncontrolled again. No hypoglycemia. Tolerating little diet today though he did eat cereal prior to 1130AM spot check. Will start bolus feeds four times daily this afternoon. Low grade fever.    /83 (BP Location: Left arm, Patient Position: Lying)   Pulse 83   Temp 99.1 °F (37.3 °C) (Oral)   Resp 18   Ht 5' 11" (1.803 m)   Wt 61 kg (134 lb 7.7 oz)   SpO2 98%   BMI 18.76 kg/m²     Labs Reviewed and Include    No results for input(s): GLU, CALCIUM, ALBUMIN, PROT, NA, K, CO2, CL, BUN, CREATININE, ALKPHOS, ALT, AST, BILITOT in the last 24 hours.  Lab Results   Component Value Date    WBC 11.55 09/04/2017    HGB 10.2 (L) 09/04/2017    HCT 29.8 (L) 09/04/2017    MCV 88 09/04/2017     09/04/2017     No results for input(s): TSH, FREET4 in the last 168 hours.  Lab Results   Component Value Date    HGBA1C 9.6 (H) 09/03/2017     Nutritional status:   Body mass index is 18.76 kg/m².  Lab Results   Component Value Date    ALBUMIN 2.0 (L) 09/04/2017    ALBUMIN 2.2 (L) 09/03/2017    ALBUMIN 2.4 (L) 09/02/2017     No results found for: PREALBUMIN    Estimated Creatinine Clearance: 97.4 mL/min (based on SCr of 0.8 mg/dL).    Accu-Checks  Recent Labs      09/03/17   1536  09/03/17   1550  09/03/17   1751  09/03/17   2335  09/04/17   0831  09/04/17   1143  09/04/17   1702  09/04/17   2141  09/04/17   2253  09/05/17   0222   POCTGLUCOSE  >500*  427*  323*  326*  375*  260*  177*  70  71  158*       Current Medications and/or Treatments Impacting Glycemic Control  Immunotherapy:    Immunosuppressants     None        Steroids:   Hormones     None        Pressors:    Autonomic Drugs     Start     Stop Route Frequency Ordered    08/31/17 1123  rocuronium injection 50 mg      -- IV On Call Procedure 08/31/17 1123        Hyperglycemia/Diabetes " Medications:   Antihyperglycemics     Start     Stop Route Frequency Ordered    09/04/17 1245  insulin regular (Humulin R) 100 Units in sodium chloride 0.9% 100 mL infusion      -- IV Continuous 09/04/17 1143    09/04/17 1145  insulin aspart pen 10 Units      -- SubQ 3 times daily with meals 09/04/17 1143    09/03/17 1750  insulin aspart pen 1-10 Units      -- SubQ Before meals & nightly PRN 09/03/17 2358

## 2017-09-05 NOTE — PLAN OF CARE
"Problem: Patient Care Overview  Goal: Plan of Care Review  Plan of care reviewed with patient.  Tracheostomy in place.  Patient unable to verbalize needs but able to make needs known with gestures and communication board.  O2 @ 5 liter via trach collar. Voiding freely in urinal.  Peptide 1.5 @ 45 ml/hr to G-tube; patient complaining of abdominal discomfort and feeling "full".  Patient refused feedings; placed on hold.  Tolerated Dental soft ADA diet..  Patient BG 70; insulin pump placed on hold with repeat BG-71 and 158.  Patient remains free of falls or injuries this shift.  Call light within reach.  No s/s acute distress noted.      "

## 2017-09-05 NOTE — PLAN OF CARE
Problem: SLP Goal  Goal: SLP Goal  Speech Language Pathology Goals  Goals expected to be met by 9/8    1. Pt will tolerate nectar thick liquids/dental soft diet without overt s/s of aspiration. ONGOING  2. Pt will tolerate solids x10 without overt s/s of aspiration ONGOING    Speech Language Pathology Goals  Goals expected to be met by 9/12   1. Pt will tolerate nectar thick liquids/dental soft diet without overt s/s of aspiration.   2. Pt will tolerate solids x10 without overt s/s of aspiration   3. Pt will tolerate PMSV during all awake hours without s/s of distress to improve respiration, phonation, and communication.   4. Pt will recall 3/3 PMSV precautions provided min cues to facilitate desensitization and independence.       PMSV evaluation completed with updated POC.    Deja Tellez M.A. CCC-SLP  Speech Language Pathologist  [phone]  9/5/2017

## 2017-09-05 NOTE — SUBJECTIVE & OBJECTIVE
Past Medical History:   Diagnosis Date    Insulin dependent diabetes mellitus        History reviewed. No pertinent surgical history.    Review of patient's allergies indicates:  No Known Allergies    No current facility-administered medications on file prior to encounter.      No current outpatient prescriptions on file prior to encounter.     Family History     None        Social History Main Topics    Smoking status: Former Smoker    Smokeless tobacco: Never Used    Alcohol use Not on file    Drug use: Unknown    Sexual activity: Not on file     Review of Systems   Constitutional: Positive for activity change, appetite change, fatigue and unexpected weight change. Negative for chills and fever.   HENT: Negative for congestion and nosebleeds.    Eyes: Negative for discharge.   Respiratory: Negative for shortness of breath and wheezing.    Cardiovascular: Negative for chest pain and palpitations.   Gastrointestinal: Negative for abdominal pain, diarrhea and vomiting.   Genitourinary: Negative for difficulty urinating.   Neurological: Negative for dizziness and weakness.     Objective:     Vital Signs (Most Recent):  Temp: 99.1 °F (37.3 °C) (09/05/17 0738)  Pulse: 98 (09/05/17 1019)  Resp: 18 (09/05/17 1019)  BP: 136/83 (09/05/17 0738)  SpO2: 96 % (09/05/17 1019) Vital Signs (24h Range):  Temp:  [98.7 °F (37.1 °C)-99.7 °F (37.6 °C)] 99.1 °F (37.3 °C)  Pulse:  [] 98  Resp:  [16-20] 18  SpO2:  [95 %-98 %] 96 %  BP: (116-140)/(63-83) 136/83     Weight: 61 kg (134 lb 7.7 oz)  Body mass index is 18.76 kg/m².    Physical Exam   Constitutional:   Tracheostomy tube in place, yellowish fluid draining from tube site   HENT:   Head: Normocephalic.   Eyes: Pupils are equal, round, and reactive to light.   Cardiovascular: Normal rate, regular rhythm, normal heart sounds and intact distal pulses.  Exam reveals no gallop and no friction rub.    No murmur heard.  Pulmonary/Chest: Effort normal and breath sounds normal.  No respiratory distress. He has no wheezes.   Abdominal: Soft. He exhibits no distension.   Neurological: He is alert.   Skin: Skin is warm and dry.       Significant Labs:   Recent Lab Results       09/05/17  0902 09/05/17  0222 09/04/17  2253 09/04/17  2141 09/04/17  1702      POCT Glucose 357(H) 158(H) 71 70 177(H)                 09/04/17  1143      POCT Glucose 260(H)           Significant Imaging: CXR: I have reviewed all pertinent results/findings within the past 24 hours and my personal findings are:  right lobe opacity

## 2017-09-05 NOTE — SUBJECTIVE & OBJECTIVE
Interval History: NAEON. glory diabetic soft dental diet and impact peptide TF's. Glu ~ 100's with adjustments per Endocrine; thick/malodorous secretions via trach    Medications:  Continuous Infusions:   sodium chloride 0.9% 55 mL/hr at 09/04/17 2143    insulin (HUMAN R) infusion (adults) Stopped (09/04/17 2153)     Scheduled Meds:   ceFEPime (MAXIPIME) IVPB  2 g Intravenous Q8H    enoxaparin  40 mg Subcutaneous Daily    insulin aspart  10 Units Subcutaneous TIDWM    sodium chloride 0.9%  3 mL Intravenous Q8H     PRN Meds:dextrose 50%, dextrose 50%, diphenhydrAMINE, fentaNYL, glucagon (human recombinant), glucose, glucose, hydrALAZINE, HYDROmorphone, insulin aspart, midazolam (PF), ondansetron, oxycodone, oxycodone, rocuronium     Review of patient's allergies indicates:  No Known Allergies  Objective:     Vital Signs (24h Range):  Temp:  [98.7 °F (37.1 °C)-99.7 °F (37.6 °C)] 99.7 °F (37.6 °C)  Pulse:  [] 90  Resp:  [16-20] 18  SpO2:  [95 %-98 %] 97 %  BP: (116-140)/(63-82) 136/81        Lines/Drains/Airways     Drain                 Gastrostomy/Enterostomy 08/31/17 1900 4 days          Airway                 Surgical Airway 08/31/17 0734 Shiley Cuffed 4 days          Peripheral Intravenous Line                 Peripheral IV - Single Lumen 08/30/17 2100 Left Wrist 5 days         Peripheral IV - Single Lumen 08/31/17 1100 Right Forearm 4 days                Physical Exam    NAD, aao x 3  No resp distress on humidified 02 via trach collar, but with thick discolored malodorous secretions  Exchanged to 6.0 DCFS;   Neck soft    Significant Labs:    CBC    Recent Labs  Lab 09/03/17  0707 09/04/17  0858   WBC 11.35 11.55   HGB 10.7* 10.2*   HCT 30.3* 29.8*   MCV 88 88    220     BMP    Recent Labs  Lab 09/03/17  0707 09/04/17  0858   * 453*    134*   K 3.9 4.1   CL 96 96   CO2 31* 26   BUN 24* 26*   CREATININE 0.8 0.8   CALCIUM 9.7 9.6     COAGS  No results for input(s): PROTIME, INR, PTT in  the last 72 hours.    Results for NELLIE BLOOM (MRN [id number]) as of 9/5/2017 07:04   Ref. Range 9/4/2017 11:43 9/4/2017 17:02 9/4/2017 21:41 9/4/2017 22:53 9/5/2017 02:22   POCT Glucose Latest Ref Range: 70 - 110 mg/dL 260 (H) 177 (H) 70 71 158 (H)       Significant Diagnostics:  CXR 9/4/17- Increasing bilateral lung consolidation consistent with a pneumonia.    CXR 9/5/17 pending    MBSS 8/31 - There is penetration with honey thick liquid and aspiration with thin liquids.

## 2017-09-05 NOTE — CONSULTS
[facility]  Adult Nutrition  Consult Note    SUMMARY     Recommendations    Recommendation/Intervention:   Consult received for home TF recs.   As pt will be eating po, recommend meeting ~75% EEN via TF. Recommend Diabetisource as pt with hx DM and HgbA1c 9.3).   Bolus 250mL (1 can) 5 times daily with 100mL water flushes before and after each bolus feed.   -Provides 1500kcals, 75g protein and 1020mL free water.     RD to monitor.    Goals: TF to meet b/w % EEN/EPN  Nutrition Goal Status: progressing towards goal  Communication of RD Recs: reviewed with RN    Reason for Assessment    Reason for Assessment: RD follow-up  Diagnosis: other (see comments) (tracheal & laryngeal masses s/p bx, trach/PEG 8/31)  Relevent Medical History: DMII, HTN   Interdisciplinary Rounds: did not attend     General Information Comments: Pt continues on trach collar. Diet advanced. Bolus feeds and po intake at this time.    Nutrition Discharge Planning: TF + po intake for optimal nutrition. Formula and amt to be determined pending clinical course    Nutrition Prescription Ordered    Current Diet Order: NPO  Nutrition Order Comments: Bolus feeds started today  Current Nutrition Support Formula Ordered: Impact Peptide 1.5  Current Nutrition Support Rate Ordered: 0 (ml)  Current Nutrition Support Frequency Ordered: 300mL QID        Evaluation of Received Nutrients/Fluid Intake    Enteral Calories (kcal): 1350  Enteral Protein (gm): 85  Enteral (Free Water) Fluid (mL): 693  Free Water Flush Fluid (mL): 1500      Energy Calories Required: not meeting needs  % Kcal Needs: 69    Protein Required: meeting needs  % Protein Needs: 100     IV Fluid (mL): 1320  Total Fluid Intake (mL): 2820  I/O: +I/O overall, -I/O overnight, good UOP, LBM 9/1         Fluid Required: exceed needs  Comments: 50mL residuals 9/2  Tolerance: tolerating  % Intake of Estimated Energy Needs: 75 - 100 %  % Meal Intake: 50%     Nutrition Risk  "Screen     Nutrition Risk Screen: dysphagia or difficulty swallowing    Nutrition/Diet History       Typical Food/Fluid Intake: Pt consuming 50% of meals. Bolus feeds started this morning.  Food Preferences: BRADY        Factors Affecting Nutritional Intake: other (see comments) (None at this time)                Labs/Tests/Procedures/Meds       Pertinent Labs Reviewed: reviewed  Pertinent Labs Comments: POCT Glu , Na 134  Pertinent Medications Reviewed: reviewed  Pertinent Medications Comments: insulin, IVF    Physical Findings    Overall Physical Appearance: loss of muscle mass, other (see comments) (on trach collar)  Tubes: gastrostomy tube     Skin: incision (sx incision)    Anthropometrics    Temp: 99.4 °F (37.4 °C)     Height: 5' 11" (180.3 cm)  Weight Method: Standard Scale  Weight: 61 kg (134 lb 7.7 oz)  Ideal Body Weight (IBW), Male: 172 lb     % Ideal Body Weight, Male (lb): 78.19 lb     BMI (Calculated): 18.8  BMI Grade: 18.5-24.9 - normal     Usual Body Weight (UBW), kg:  (72.3kg UBW)  Weight Change Amount: 25 lb (x 2 mos)                   Estimated/Assessed Needs    Weight Used For Calorie Calculations: 61 kg (134 lb 7.7 oz)      Energy Calorie Requirements (kcal): 1952  Energy Need Method: [place]-St Jeor (x 1.3 (PAL) )        RMR ([place]-St. Jeor Equation): 1502.12        Weight Used For Protein Calculations: 61 kg (134 lb 7.7 oz)  Protein Requirements: 73-86 gms (1.2-1.4 gms/kg)    Fluid Requirements (mL): 1ml/kcal or per MD   RDA Method (mL): 1952     CHO requirements: 50% of total kcals        Assessment and Plan    Dysphagia, oropharyngeal    Nutrition Problem:  Inadequate oral intake    Related to (etiology):   Laryngeal tumor    Signs and Symptoms (as evidenced by):   NPO w/ need for TF to meet nutr needs    Interventions/Recommendations (treatment strategy):  See recs    Nutrition Diagnosis Status:   Improving              Monitor and Evaluation    Food and Nutrient Intake: enteral " nutrition intake, energy intake  Food and Nutrient Adminstration: enteral and parenteral nutrition administration, diet order     Physical Activity and Function: nutrition-related ADLs and IADLs  Anthropometric Measurements: weight, weight change  Biochemical Data, Medical Tests and Procedures: electrolyte and renal panel, glucose/endocrine profile  Nutrition-Focused Physical Findings: overall appearance    Nutrition Risk    Level of Risk: other (see comments) (F/u 2 x weekly)    Nutrition Follow-Up    RD Follow-up?: Yes

## 2017-09-05 NOTE — PLAN OF CARE
SW placed call to [facility] with [facility] ([id number]) who stated she had sent Pt's trach supplies to [facility] and that their [place] location would be able to deliver Pt's portable suction machine to the room. SMILEY notified ENT NP that Nutrition had placed recs for tube feeds.     Shirley Sierra LCSW

## 2017-09-05 NOTE — CONSULTS
[facility] Hospital Medicine  Consult Note    Patient Name: Luís Esteban  MRN: [id number]  Admission Date: 8/30/2017  Hospital Length of Stay: 6 days  Attending Physician: Corrie Ferguson MD  Primary Care Provider: Primary Doctor [facility] Medicine Team: Networked reference to record PCT  Andre Chan MD      Patient information was obtained from patient, past medical records and ER records.     Inpatient consult to [facility] Medicine-General  Consult performed by: ANDRE CHAN  Consult ordered by: ELIJAH TORO  Reason for consult: HCAP        Subjective:     Principal Problem: Laryngeal mass    Chief Complaint:   Chief Complaint   Patient presents with    Shortness of Breath     transfer from [facility] for tracheal mass.         HPI: Mr. Esteban is a 48 yo M with DM and HTN who presents as transfer from [facility] with stridor, hoarseness, and known laryngeal mass with CT evidence of bulky laryngeal disease very concerning for malignancy. No immediate airway risk as patient has had slowly progressive stridor and hoarseness fo r past 2 months. Patient was evaluated by ENT,  s/p laryngectomy and biopsy by Heme/Onc that revealed invasive squamous cell carcinoma state T4a, No, Mo. Tracheostomy tube in place, pending recommendation for out-patient treatment plan from heme/onc and radiology. Medicine is consulted for PNA management as patient's respiratory culture grew pseudomonas and chest xray revealed right lobe consolidation since 09/04. Currently on cefepime 2g Q8H.     Past Medical History:   Diagnosis Date    Insulin dependent diabetes mellitus        History reviewed. No pertinent surgical history.    Review of patient's allergies indicates:  No Known Allergies    No current facility-administered medications on file prior to encounter.      No current outpatient prescriptions on file prior to encounter.     Family History     None        Social  History Main Topics    Smoking status: Former Smoker    Smokeless tobacco: Never Used    Alcohol use Not on file    Drug use: Unknown    Sexual activity: Not on file     Review of Systems   Constitutional: Positive for activity change, appetite change, fatigue and unexpected weight change. Negative for chills and fever.   HENT: Negative for congestion and nosebleeds.    Eyes: Negative for discharge.   Respiratory: Negative for shortness of breath and wheezing.    Cardiovascular: Negative for chest pain and palpitations.   Gastrointestinal: Negative for abdominal pain, diarrhea and vomiting.   Genitourinary: Negative for difficulty urinating.   Neurological: Negative for dizziness and weakness.     Objective:     Vital Signs (Most Recent):  Temp: 99.1 °F (37.3 °C) (09/05/17 0738)  Pulse: 98 (09/05/17 1019)  Resp: 18 (09/05/17 1019)  BP: 136/83 (09/05/17 0738)  SpO2: 96 % (09/05/17 1019) Vital Signs (24h Range):  Temp:  [98.7 °F (37.1 °C)-99.7 °F (37.6 °C)] 99.1 °F (37.3 °C)  Pulse:  [] 98  Resp:  [16-20] 18  SpO2:  [95 %-98 %] 96 %  BP: (116-140)/(63-83) 136/83     Weight: 61 kg (134 lb 7.7 oz)  Body mass index is 18.76 kg/m².    Physical Exam   Constitutional:   Tracheostomy tube in place, yellowish fluid draining from tube site   HENT:   Head: Normocephalic.   Eyes: Pupils are equal, round, and reactive to light.   Cardiovascular: Normal rate, regular rhythm, normal heart sounds and intact distal pulses.  Exam reveals no gallop and no friction rub.    No murmur heard.  Pulmonary/Chest: Effort normal and breath sounds normal. No respiratory distress. He has no wheezes.   Abdominal: Soft. He exhibits no distension.   Neurological: He is alert.   Skin: Skin is warm and dry.       Significant Labs:   Recent Lab Results       09/05/17  0902 09/05/17  0222 09/04/17  2253 09/04/17  2141 09/04/17  1702      POCT Glucose 357(H) 158(H) 71 70 177(H)                 09/04/17  1143      POCT Glucose 260(H)            Significant Imaging: CXR: I have reviewed all pertinent results/findings within the past 24 hours and my personal findings are:  right lobe opacity    Assessment/Plan:     Pneumonia due to Pseudomonas species    48 yo males with hx of laryngeal ca s/p laryngectomy, now with HCAP    -Respiratory culture showing pseudomonas/gram negative rods and right lobe consolidation on chest xray  -Currently on Cefepime 2g q8h  -culture sensitive to cipro, given levofloxacin has good pulmonary penetration, consider levofloxacin if discharging home, 750 mg daily for 10 days            DM (diabetes mellitus), type 2, uncontrolled    On insulin gtt  10 units of aspart TId  -followed by endocrinology, will defer to endo          Squamous cell carcinoma of larynx    S/P laryngectomy  -will defer to primary team             VTE Risk Mitigation         Ordered     enoxaparin injection 40 mg  Daily     Route:  Subcutaneous        08/31/17 1014     Low Risk of VTE  Once      08/30/17 1859              Thank you for your consult. I will follow-up with patient. Please contact us if you have any additional questions.    Bo Cardoza MD  Department of Hospital Medicine   [facility]

## 2017-09-05 NOTE — PROGRESS NOTES
[facility]  Otorhinolaryngology-Head & Neck Surgery  Progress Note    Subjective:     Post-Op Info:  Procedure(s) (LRB):  TRACHEOTOMY- AWAKE (N/A)  LARYNGOSCOPY (N/A)   5 Days Post-Op  Hospital Day: 7     Interval History: NAEON. glory diabetic soft dental diet and impact peptide TF's. Glu ~ 100's with adjustments per Endocrine; thick/malodorous secretions via trach    Medications:  Continuous Infusions:   sodium chloride 0.9% 55 mL/hr at 09/04/17 2143    insulin (HUMAN R) infusion (adults) Stopped (09/04/17 2153)     Scheduled Meds:   ceFEPime (MAXIPIME) IVPB  2 g Intravenous Q8H    enoxaparin  40 mg Subcutaneous Daily    insulin aspart  10 Units Subcutaneous TIDWM    sodium chloride 0.9%  3 mL Intravenous Q8H     PRN Meds:dextrose 50%, dextrose 50%, diphenhydrAMINE, fentaNYL, glucagon (human recombinant), glucose, glucose, hydrALAZINE, HYDROmorphone, insulin aspart, midazolam (PF), ondansetron, oxycodone, oxycodone, rocuronium     Review of patient's allergies indicates:  No Known Allergies  Objective:     Vital Signs (24h Range):  Temp:  [98.7 °F (37.1 °C)-99.7 °F (37.6 °C)] 99.7 °F (37.6 °C)  Pulse:  [] 90  Resp:  [16-20] 18  SpO2:  [95 %-98 %] 97 %  BP: (116-140)/(63-82) 136/81        Lines/Drains/Airways     Drain                 Gastrostomy/Enterostomy 08/31/17 1900 4 days          Airway                 Surgical Airway 08/31/17 0734 Shiley Cuffed 4 days          Peripheral Intravenous Line                 Peripheral IV - Single Lumen 08/30/17 2100 Left Wrist 5 days         Peripheral IV - Single Lumen 08/31/17 1100 Right Forearm 4 days                Physical Exam    NAD, aao x 3  No resp distress on humidified 02 via trach collar, but with thick discolored malodorous secretions  Exchanged to 6.0 DCFS;   Neck soft    Significant Labs:    CBC    Recent Labs  Lab 09/03/17  0707 09/04/17  0858   WBC 11.35 11.55   HGB 10.7* 10.2*   HCT 30.3* 29.8*   MCV 88 88    220      BMP    Recent Labs  Lab 09/03/17  0707 09/04/17  0858   * 453*    134*   K 3.9 4.1   CL 96 96   CO2 31* 26   BUN 24* 26*   CREATININE 0.8 0.8   CALCIUM 9.7 9.6     COAGS  No results for input(s): PROTIME, INR, PTT in the last 72 hours.    Results for NELLIE BLOOM (MRN [id number]) as of 9/5/2017 07:04   Ref. Range 9/4/2017 11:43 9/4/2017 17:02 9/4/2017 21:41 9/4/2017 22:53 9/5/2017 02:22   POCT Glucose Latest Ref Range: 70 - 110 mg/dL 260 (H) 177 (H) 70 71 158 (H)       Significant Diagnostics:  CXR 9/4/17- Increasing bilateral lung consolidation consistent with a pneumonia.    CXR 9/5/17 pending    MBSS 8/31 - There is penetration with honey thick liquid and aspiration with thin liquids.    Assessment/Plan:     Squamous cell carcinoma of larynx    Mr. Bloom is a 48 yo M with DM and HTN who presents as transfer from [facility] with stridor, hoarseness, and known laryngeal mass with CT evidence of bulky laryngeal disease very concerning for malignancy. No immediate airway risk as patient has had slowly progressive stridor and hoarseness fo r past 2 months. Hypercapnea on ABG.     POD 5 s/p DLB and trach (ENT, Pancho) and bedside PEG (Gen Surg, Goldberg)  Frozen sections c/w invasive SCCa     CT chest with ill-defined pulm nodules, and 1 micronodule (3 mm)     PLAN:  Pneumonia   CXR pending today   pseudomonas on respiratory cx : pan-sensitive   Continue cefipime for now, defer to IM (consulted) for outpatient ABX recs  S/p Tracheostomy   Now with 6.0 DCFS   Consult SLP for PMSV  Laryngeal Cancer   Path: invasive keratinizing poorly differentiated carcinoma   HemOnc and Radonc consulted while inpatient    Radtx as adjuvant to the laryngectomy, or radtx as definitive treatment with concurrent chemotherapy   will likely need PET/CT as OP    CV   Home anti-HTN meds       FEN/GI   Cont TF (Impact Peptide 1.5) via PEG   Transition to bolus (Fu nutrition recs)   OK for diabetic dental  soft and nectar thicks per SLP (MBSS 8/31/17)   NO thin liquids or orange juice/similar juices with high sugar content  ENDO   SSI (h/o DM2); Endocrine consulted, adjusted meds with correction of BG     DISPO:    Consult SW for trach and PEG supplies.    Discharge pending supplies, and resolution/contro of hyperglycemia (Endo recs) and Pneumonia (IM recs)            Wiliam Meeks MD  Otorhinolaryngology-Head & Neck Surgery  [facility]

## 2017-09-05 NOTE — PHYSICIAN QUERY
PT Name: Luís Esteban  MR #: [id number]    Physician Query Form - Nutrition Clarification     CDS/: Monie Kramer RN                Contact information: [email or web address]    This form is a permanent document in the medical record.     Query Date: September 5, 2017    By submitting this query, we are merely seeking further clarification of documentation.. Please utilize your independent clinical judgment when addressing the question(s) below.    The Medical record contains the following:   Indicators  Supporting Clinical Findings Location in Medical Record   x % of Estimated Energy Intake over a time frame from p.o., TF, or TPN TF to meet b/w % EEN/EPN    % Intake of Estimated Energy Needs: 75 - 100 %  % Meal Intake: NPO  Nutrition CN 9/1   x Weight Status over a time frame Weight Change Amount: 25 lb (x 2 mos) Nutrition CN 9/1    Subcutaneous Fat and/or Muscle Loss loss of muscle mass Nutrition CN 9/1    Fluid Accumulation or Edema      Reduced  Strength     x Wt / BMI / Usual Body Weight BMI (Calculated): 18.8 Nutrition CN 9/1    Delayed Wound Healing / Failure to Thrive     x Acute or Chronic Illness Obstructing larynx mass.  Squamous cell carcinoma of the larynx OP note 8/31    Medication     x Treatment Impact Peptide 1.5's protein concentration is excessive in meeting this pt's EEN & EPN. For continuous TF, rec Isosource 1.5 w/ a goal rate of 55ml/hr  Nutrition CN 9/1   x Other Factors Affecting Nutritional Intake: difficulty/impaired swallowing, NPO Nutrition CN 9/1     AND / ASPEN Clinical Characteristics (October 2011)  A minimum of two characteristics is recommended for diagnosing either moderate or severe malnutrition   Mild Malnutrition Moderate Malnutrition Severe Malnutrition   Energy Intake from p.o., TF or TPN. < 75% intake of estimated energy needs for less than 7 days < 75% intake of estimated energy needs for greater than 7 days < 50% intake of estimated energy needs for >  5 days   Weight Loss 1-2% in 1 month  5% in 3 months  7.5% in 6 months  10% in 1 year 1-2 % in 1 week  5% in 1 month  7.5% in 3 months  10% in 6 months  20% in 1 year > 2% in 1 week  > 5% in 1 month  > 7.5% in 3 months  > 10% in 6 months  > 20% in 1 year   Physical Findings     None *Mild subcutaneous fat and/or muscle loss  *Mild fluid accumulation  *Stage II decubitus  *Surgical wound or non-healing wound *Mod/severe subcutaneous fat and/or muscle loss  *Mod/severe fluid accumulation  *Stage III or IV decubitus  *Non-healing surgical wound     Provider, please specify diagnosis or diagnoses associated with above clinical findings.    [ ] Mild Protein-Calorie Malnutrition  [ ] Moderate Protein-Calorie Malnutrition  [ ] Severe Protein-Calorie Malnutrition  [ ] Other Nutritional Diagnosis (please specify): ____________________________________  [x] Other: ________Cachexia________________________  [ ] Clinically Undetermined    Please document in your progress notes daily for the duration of treatment until resolved and include in your discharge summary.

## 2017-09-06 LAB
BACTERIA BLD CULT: NORMAL
BACTERIA BLD CULT: NORMAL
POCT GLUCOSE: 120 MG/DL (ref 70–110)
POCT GLUCOSE: 136 MG/DL (ref 70–110)
POCT GLUCOSE: 180 MG/DL (ref 70–110)
POCT GLUCOSE: 285 MG/DL (ref 70–110)
POCT GLUCOSE: 388 MG/DL (ref 70–110)
POCT GLUCOSE: 59 MG/DL (ref 70–110)

## 2017-09-06 PROCEDURE — 63600175 PHARM REV CODE 636 W HCPCS: Performed by: STUDENT IN AN ORGANIZED HEALTH CARE EDUCATION/TRAINING PROGRAM

## 2017-09-06 PROCEDURE — 99233 SBSQ HOSP IP/OBS HIGH 50: CPT | Mod: ,,, | Performed by: NURSE PRACTITIONER

## 2017-09-06 PROCEDURE — 99900031 HC PATIENT EDUCATION (STAT)

## 2017-09-06 PROCEDURE — 25000003 PHARM REV CODE 250: Performed by: STUDENT IN AN ORGANIZED HEALTH CARE EDUCATION/TRAINING PROGRAM

## 2017-09-06 PROCEDURE — 63600175 PHARM REV CODE 636 W HCPCS: Performed by: NURSE PRACTITIONER

## 2017-09-06 PROCEDURE — 99900022

## 2017-09-06 PROCEDURE — 99900026 HC AIRWAY MAINTENANCE (STAT)

## 2017-09-06 PROCEDURE — 27000221 HC OXYGEN, UP TO 24 HOURS

## 2017-09-06 PROCEDURE — 20600001 HC STEP DOWN PRIVATE ROOM

## 2017-09-06 PROCEDURE — 92526 ORAL FUNCTION THERAPY: CPT

## 2017-09-06 PROCEDURE — 63600175 PHARM REV CODE 636 W HCPCS

## 2017-09-06 PROCEDURE — 94761 N-INVAS EAR/PLS OXIMETRY MLT: CPT

## 2017-09-06 PROCEDURE — A4216 STERILE WATER/SALINE, 10 ML: HCPCS | Performed by: STUDENT IN AN ORGANIZED HEALTH CARE EDUCATION/TRAINING PROGRAM

## 2017-09-06 PROCEDURE — 25000003 PHARM REV CODE 250

## 2017-09-06 RX ORDER — LEVOFLOXACIN 750 MG/1
750 TABLET ORAL DAILY
Qty: 10 TABLET | Refills: 0 | Status: SHIPPED | OUTPATIENT
Start: 2017-09-06 | End: 2017-09-16

## 2017-09-06 RX ORDER — INSULIN ASPART 100 [IU]/ML
INJECTION, SOLUTION INTRAVENOUS; SUBCUTANEOUS
Qty: 1 BOX | Refills: 1 | Status: ON HOLD | OUTPATIENT
Start: 2017-09-06 | End: 2017-10-02

## 2017-09-06 RX ORDER — CEFEPIME HYDROCHLORIDE 2 G/50ML
2 INJECTION, SOLUTION INTRAVENOUS
Status: DISCONTINUED | OUTPATIENT
Start: 2017-09-06 | End: 2017-09-06

## 2017-09-06 RX ORDER — INSULIN ASPART 100 [IU]/ML
4 INJECTION, SOLUTION INTRAVENOUS; SUBCUTANEOUS
Status: DISCONTINUED | OUTPATIENT
Start: 2017-09-07 | End: 2017-09-07

## 2017-09-06 RX ORDER — IBUPROFEN 200 MG
16 TABLET ORAL
Status: DISCONTINUED | OUTPATIENT
Start: 2017-09-06 | End: 2017-09-07 | Stop reason: HOSPADM

## 2017-09-06 RX ORDER — INSULIN ASPART 100 [IU]/ML
6-10 INJECTION, SOLUTION INTRAVENOUS; SUBCUTANEOUS
Status: DISCONTINUED | OUTPATIENT
Start: 2017-09-06 | End: 2017-09-07

## 2017-09-06 RX ORDER — INSULIN ASPART 100 [IU]/ML
0-5 INJECTION, SOLUTION INTRAVENOUS; SUBCUTANEOUS
Status: DISCONTINUED | OUTPATIENT
Start: 2017-09-06 | End: 2017-09-07 | Stop reason: HOSPADM

## 2017-09-06 RX ORDER — CIPROFLOXACIN 750 MG/1
750 TABLET, FILM COATED ORAL EVERY 12 HOURS
Status: DISCONTINUED | OUTPATIENT
Start: 2017-09-06 | End: 2017-09-07 | Stop reason: HOSPADM

## 2017-09-06 RX ORDER — GLUCAGON 1 MG
1 KIT INJECTION
Status: DISCONTINUED | OUTPATIENT
Start: 2017-09-06 | End: 2017-09-07 | Stop reason: HOSPADM

## 2017-09-06 RX ORDER — PEN NEEDLE, DIABETIC 30 GX3/16"
NEEDLE, DISPOSABLE MISCELLANEOUS
Qty: 200 EACH | Refills: 1 | Status: SHIPPED | OUTPATIENT
Start: 2017-09-06

## 2017-09-06 RX ORDER — IBUPROFEN 200 MG
24 TABLET ORAL
Status: DISCONTINUED | OUTPATIENT
Start: 2017-09-06 | End: 2017-09-07 | Stop reason: HOSPADM

## 2017-09-06 RX ORDER — OXYCODONE HCL 5 MG/5 ML
10 SOLUTION, ORAL ORAL
Qty: 473 ML | Refills: 0 | Status: ON HOLD | OUTPATIENT
Start: 2017-09-06 | End: 2017-10-02 | Stop reason: HOSPADM

## 2017-09-06 RX ADMIN — Medication 3 ML: at 02:09

## 2017-09-06 RX ADMIN — MOXIFLOXACIN HYDROCHLORIDE 400 MG: 400 TABLET, FILM COATED ORAL at 09:09

## 2017-09-06 RX ADMIN — INSULIN ASPART 10 UNITS: 100 INJECTION, SOLUTION INTRAVENOUS; SUBCUTANEOUS at 12:09

## 2017-09-06 RX ADMIN — INSULIN ASPART 4 UNITS: 100 INJECTION, SOLUTION INTRAVENOUS; SUBCUTANEOUS at 11:09

## 2017-09-06 RX ADMIN — CIPROFLOXACIN HYDROCHLORIDE 750 MG: 750 TABLET, FILM COATED ORAL at 10:09

## 2017-09-06 RX ADMIN — OXYCODONE HYDROCHLORIDE 10 MG: 5 SOLUTION ORAL at 06:09

## 2017-09-06 RX ADMIN — INSULIN DETEMIR 16 UNITS: 100 INJECTION, SOLUTION SUBCUTANEOUS at 12:09

## 2017-09-06 RX ADMIN — INSULIN ASPART 10 UNITS: 100 INJECTION, SOLUTION INTRAVENOUS; SUBCUTANEOUS at 06:09

## 2017-09-06 RX ADMIN — ENOXAPARIN SODIUM 40 MG: 100 INJECTION SUBCUTANEOUS at 06:09

## 2017-09-06 RX ADMIN — CEFEPIME HYDROCHLORIDE 2 G: 2 INJECTION, SOLUTION INTRAVENOUS at 12:09

## 2017-09-06 RX ADMIN — INSULIN ASPART 5 UNITS: 100 INJECTION, SOLUTION INTRAVENOUS; SUBCUTANEOUS at 09:09

## 2017-09-06 RX ADMIN — OXYCODONE HYDROCHLORIDE 10 MG: 5 SOLUTION ORAL at 11:09

## 2017-09-06 RX ADMIN — INSULIN ASPART 3 UNITS: 100 INJECTION, SOLUTION INTRAVENOUS; SUBCUTANEOUS at 12:09

## 2017-09-06 RX ADMIN — INSULIN ASPART 10 UNITS: 100 INJECTION, SOLUTION INTRAVENOUS; SUBCUTANEOUS at 09:09

## 2017-09-06 NOTE — PLAN OF CARE
Mr. Esteban is a 46 yo M with DM and HTN and invasive squamous cell carcinoma s/p laryngectomy on tracheostomy tube, now with HCAP, resp culture showing pseudomonas.     Interval history: Patient remains afebrile. No acute events over night.    A/P:  Pneumonia due to Pseudomonas species     46 yo males with hx of laryngeal ca s/p laryngectomy, now with HCAP     -Respiratory culture showing pseudomonas/gram negative rods and right lobe consolidation on chest xray  -culture sensitive to cipro, given levofloxacin has good pulmonary penetration, consider levofloxacin if discharging home, 750 mg daily for 10 days  -Levofloxacin not an option as no formulary in [facility],  consider ciprofloxacin, a dose of cefepime today       DM (diabetes mellitus), type 2, uncontrolled     On insulin gtt  10 units of aspart TId  -followed by endocrinology, will defer to endo       Squamous cell carcinoma of larynx     S/P laryngectomy  -will defer to primary team      Bo Cardoza MD  Department of Hospital Medicine   [facility]

## 2017-09-06 NOTE — PLAN OF CARE
Problem: Patient Care Overview  Goal: Plan of Care Review  Outcome: Ongoing (interventions implemented as appropriate)  POC reviewed with patient who verbalized understanding. AAOX4. Pt O2 sats stable on 5L 28% trach collar. G tube intact and patent. Pt tolerating diet and bolus TFs well with no nausea. Pt voiding via urinal with adequate output. Pt on tele running NSR in 80s-90s. BG being monitored ACHS. Pts family verbalized they would have a ride to pick Pt up in the morning. Pt walked in room.  Pain being controlled with PRN pain medication. Pt remained free from falls throughout the shift. VSS. Will continue to monitor.

## 2017-09-06 NOTE — DISCHARGE SUMMARY
Otolaryngology - Head and Neck Surgery  Discharge Summary    Admission Date: 8/30/2017  Discharge Date: 9/07/2017    Admission Diagnosis:   1. Stridor    2. Mass of larynx    3. Laryngeal mass    4. Dysphagia, oropharyngeal    5. Sinus tachycardia    6. Squamous cell carcinoma of larynx    7. Uncontrolled type 2 diabetes mellitus with hyperglycemia, with long-term current use of insulin    8. Pneumonia of right lower lobe due to Pseudomonas species        Discharge Diagnosis:   1. Mass of larynx    2. Laryngeal mass    3. Dysphagia, oropharyngeal    4. Sinus tachycardia    5. Squamous cell carcinoma of larynx    6. Tracheostomy dependence    7. Uncontrolled type 2 diabetes mellitus with hyperglycemia, with long-term current use of insulin    8. On enteral nutrition    9. Pneumonia of right lower lobe due to Pseudomonas species        History of Present Illness: Mr. Esteban is a 47-year-old gentleman who   presented to the [facility] ER on 8/30/17 with a complaint of progressive shortness   of breath.  He reported a history of a biopsy of the larynx lesion in January   of this year.  There was concern that this was malignant at that time; however,   no treatment was rendered.  He reported that over the last several days, he has   become increasingly dyspneic, particularly when lying supine.  He was   transferred here from [facility] in [place].  Upon arrival,   he was noted to have hoarseness and inspiratory stridor.  On examination, he   was noted to have a large obstructing larynx mass.  However, his airway was   noted to be stable and had not significantly worsened over the last several   days.  In light of these findings, it was recommended that we proceed to the   Operating Room the following morning for awake tracheostomy and subsequently DL   with biopsy.  He was apprised of the risks, benefits and alternatives to   surgery.  In spite of the risks inherent to surgery, he provided informed    consent.       Procedures:  1.  Awake tracheostomy.  2.  Direct laryngoscopy with biopsy with use of operating telescope.    Operative Findings: Within the endolarynx, there was noted to be an endophytic tumor   occupying the left side.  The glottic inlet was completely obstructed.  Several   representative biopsies of this lesion were taken and sent to Pathology for   frozen section and permanent analysis.  After several moments had elapsed, the   pathologist phoned to the room to report that our specimen was consistent with   squamous cell carcinoma.     Hospital Course: Luís Esteban underwent the aforementioned procedures with Dr. Deleon on 8/31/17. Following surgery, he was brought to the ICU. A PEG tube was inserted at the bedside. On POD # 2, he was transferred to the [facility]. His hospital stay was complicated by uncontrolled hyperglycemia. Endocrine was consulted to assist with blood glucose management. Hospital medicine was consulted to manage his pneumonia, which was present prior to this admission. Once his blood glucose was under control, he was discharged home in stable condition (POD # 6).    Consults: endocrine, social work, SLP, rad onc, med onc, hospital medicine.    Medications:       Medication List      START taking these medications    insulin aspart 100 unit/mL Inpn pen  Commonly known as:  NovoLOG  Inject 6 units with meals, 10 units with meals + bolus Tube feeding, 4 units for just bolus tube feeding. + correction scale.  Replaces:  insulin aspart 100 unit/mL injection     insulin detemir 100 unit/mL (3 mL) Inpn pen  Commonly known as:  LEVEMIR FLEXTOUCH  Inject 16 Units into the skin once daily.  Start taking on:  9/7/2017     levoFLOXacin 750 MG tablet  Commonly known as:  LEVAQUIN  Take 1 tablet (750 mg total) by mouth once daily.     oxycodone 5 mg/5 mL Soln  Commonly known as:  ROXICODONE  10 mLs (10 mg total) by Per G Tube route every 3 (three) hours as needed.     pen needle,  "diabetic 32 gauge x 5/32" Ndle  Commonly known as:  BD ULTRA-FINE OLESYA PEN NEEDLES  To use 6 times daily with insulin injections.        STOP taking these medications    insulin aspart 100 unit/mL injection  Commonly known as:  NOVOLOG  Replaced by:  insulin aspart 100 unit/mL Inpn pen     insulin glargine 100 unit/mL injection  Commonly known as:  LANTUS           Where to Get Your Medications      These medications were sent to [facility] - [place], [place] - [address] [id number] [facility] [address]    Phone:  [phone]   · levoFLOXacin 750 MG tablet  · oxycodone 5 mg/5 mL Soln     You can get these medications from any pharmacy    Bring a paper prescription for each of these medications  · insulin aspart 100 unit/mL Inpn pen  · insulin detemir 100 unit/mL (3 mL) Inpn pen  · pen needle, diabetic 32 gauge x 5/32" Ndle         Disposition: home  Instructions:   Diet general    Lifting restrictions    Order Comments:  No heavy lifting (nothing greater than 20 lbs), no stooping   Call MD for: temperature >100.4    Call MD for: redness, tenderness, or signs of infection (pain, swelling, redness, odor or green/yellow discharge around incision site)    Change dressing (specify)    Order Comments:       Follow up in 1 week with ENT and endocrine.    Call our office at [phone] for any problems or questions  "

## 2017-09-06 NOTE — PROGRESS NOTES
[facility]  Otorhinolaryngology-Head & Neck Surgery  Progress Note    Subjective:     Post-Op Info:  Procedure(s) (LRB):  TRACHEOTOMY- AWAKE (N/A)  LARYNGOSCOPY (N/A)   6 Days Post-Op  Hospital Day: 8     No new subjective & objective note has been filed under this hospital service since the last note was generated.    Assessment/Plan:     Squamous cell carcinoma of larynx    Mr. Esteban is a 48 yo M with DM and HTN who presents as transfer from [facility] with stridor, hoarseness, and known laryngeal mass with CT evidence of bulky laryngeal disease very concerning for malignancy. No immediate airway risk as patient has had slowly progressive stridor and hoarseness fo r past 2 months. Hypercapnea on ABG.     POD 6 s/p DLB and trach (ENT, Pancho) and bedside PEG (Gen Surg, Goldberg)  Frozen sections c/w invasive SCCa  CT chest with ill-defined pulm nodules, and 1 micronodule (3 mm)     PLAN:  Pneumonia   CXR stable, LL airspace disease   pseudomonas on respiratory cx : pan-sensitive   IM rec cefepime while inpatient, Ciprofloxacin as outpatient  S/p Tracheostomy   Trach care for 6.0 DCFS   SLP for PMSV  Laryngeal Cancer   Path: invasive keratinizing poorly differentiated carcinoma   HemOnc and Radonc consulted    Radtx as adjuvant to the laryngectomy, or radtx as definitive treatment with concurrent chemotherapy    will likely need PET/CT as OP    CV   Home anti-HTN meds       FEN/GI   Diabetasource (nutertiion recs) via PEG   OK for diabetic dental soft and nectar thicks per SLP (MBSS 8/31/17)   NO thin liquids or orange juice/similar juices with high sugar content  ENDO   SSI (h/o DM2);    Endocrine consulted, adjusted meds with correction of BG\    Discharge rec: Probable discharge today to home health care. + bolus TF Diabetisource 5x/day.     Recommend to discharge home on Lantus 16 units daily.     Novolog 6 units with meals, 10 units if eating a meal + bolus TF.     Novolog 4  units for bolus TF.  + correction scale. Goal 180, ISF 50 for now.     BG monitoring AC/HS and with bolus TF.     RX printed for insulin and pen needles.     Has meter and supplies at home.     Follow up in endocrine clinic with BG logs on 9/19 at 3PM. Message sent to staff.      DISPO:    Consult SW for trach and PEG supplies.    Discharge pending supplies            Wiliam Meeks MD  Otorhinolaryngology-Head & Neck Surgery  [facility]

## 2017-09-06 NOTE — ASSESSMENT & PLAN NOTE
BG goal 140-180. Plans to d/c home today.  Has basal needs. + hypoglycemia yesterday. On DM diet with bolus TF.   D/c transition insulin infusion.   Start Levemir 16 units daily this AM.   Novolog 6 units with meals, 10 units if eating a meal + bolus TF.   Novolog 4 units for bolus TF.   BG monitoring AC/HS/MN.     Discharge rec: Probable discharge today to home health care. + bolus TF Diabetisource 5x/day.   Recommend to discharge home on Lantus 16 units daily.   Novolog 6 units with meals, 10 units if eating a meal + bolus TF.   Novolog 4 units for bolus TF.  + correction scale. Goal 180, ISF 50 for now.   BG monitoring AC/HS and with bolus TF.   RX printed for insulin and pen needles.   Has meter and supplies at home.   Follow up in endocrine clinic with BG logs on 9/19 at 3PM. Message sent to staff.

## 2017-09-06 NOTE — SUBJECTIVE & OBJECTIVE
Interval History: NAEON. glory diabetic soft dental diet and impact peptide TF's. Glu ~ 100's with adjustments per Endocrine; thick/malodorous secretions via trach slightly improved    Medications:  Continuous Infusions:   sodium chloride 0.9% 55 mL/hr at 09/05/17 1740    insulin (HUMAN R) infusion (adults) Stopped (09/05/17 2150)     Scheduled Meds:   enoxaparin  40 mg Subcutaneous Daily    insulin aspart  10-13 Units Subcutaneous TIDWM    moxifloxacin  400 mg Oral Daily    sodium chloride 0.9%  3 mL Intravenous Q8H     PRN Meds:dextrose 50%, dextrose 50%, diphenhydrAMINE, fentaNYL, glucagon (human recombinant), glucose, glucose, hydrALAZINE, HYDROmorphone, insulin aspart, midazolam (PF), ondansetron, oxycodone, oxycodone, rocuronium     Review of patient's allergies indicates:  No Known Allergies  Objective:     Vital Signs (24h Range):  Temp:  [98.1 °F (36.7 °C)-99.4 °F (37.4 °C)] 98.8 °F (37.1 °C)  Pulse:  [80-98] 87  Resp:  [16-20] 20  SpO2:  [95 %-98 %] 98 %  BP: (123-144)/(74-86) 140/80        Lines/Drains/Airways     Drain                 Gastrostomy/Enterostomy 08/31/17 1900 5 days          Airway                 Surgical Airway 08/31/17 0734 Shiley Cuffed 5 days          Peripheral Intravenous Line                 Peripheral IV - Single Lumen 08/30/17 2100 Left Wrist 6 days         Peripheral IV - Single Lumen 08/31/17 1100 Right Forearm 5 days                Physical Exam    NAD, aao x 3  No resp distress on humidified 02 via trach collar   but with thick discolored malodorous secretions  6.0 DCFS; phonates with PMSV   G-tube  Neck soft    Significant Labs:    CBC    Recent Labs  Lab 09/03/17  0707 09/04/17  0858   WBC 11.35 11.55   HGB 10.7* 10.2*   HCT 30.3* 29.8*   MCV 88 88    220     BMP    Recent Labs  Lab 09/03/17  0707 09/04/17  0858   * 453*    134*   K 3.9 4.1   CL 96 96   CO2 31* 26   BUN 24* 26*   CREATININE 0.8 0.8   CALCIUM 9.7 9.6     COAGS  No results for input(s):  PROTIME, INR, PTT in the last 72 hours.    Results for NELLIE BLOOM (MRN [id number]) as of 9/6/2017 06:50   Ref. Range 9/5/2017 17:46 9/5/2017 19:23 9/5/2017 21:47 9/5/2017 22:35 9/5/2017 23:50   POCT Glucose Latest Ref Range: 70 - 110 mg/dL 109 172 (H) 59 (L) 120 (H) 136 (H)       Significant Diagnostics:  CXR 9/4/17- Increasing bilateral lung consolidation consistent with a pneumonia.    CXR 9/5/17 There is cardiac megaly, moderate edema, and no change.    MBSS 8/31 - There is penetration with honey thick liquid and aspiration with thin liquids.

## 2017-09-06 NOTE — ASSESSMENT & PLAN NOTE
Mr. Esteban is a 48 yo M with DM and HTN who presents as transfer from [facility] with stridor, hoarseness, and known laryngeal mass with CT evidence of bulky laryngeal disease very concerning for malignancy. No immediate airway risk as patient has had slowly progressive stridor and hoarseness fo r past 2 months. Hypercapnea on ABG.     POD 7 s/p DLB and trach (ENT, Pancho) and bedside PEG (Gen Surg, Goldberg)  Frozen sections c/w invasive SCCa  CT chest with ill-defined pulm nodules, and 1 micronodule (3 mm)     PLAN:  Pneumonia   CXR stable, LL airspace disease   pseudomonas on respiratory cx : pan-sensitive   IM rec cefepime while inpatient, Ciprofloxacin as outpatient  S/p Tracheostomy   Trach care for 6.0 DCFS   SLP for PMSV  Laryngeal Cancer   Path: invasive keratinizing poorly differentiated carcinoma   HemOnc and Radonc consulted    Radtx as adjuvant to the laryngectomy, or radtx as definitive treatment with concurrent chemotherapy    will likely need PET/CT as OP    CV   Home anti-HTN meds       FEN/GI   Diabetasource (nutertiion recs) via PEG   OK for diabetic dental soft and nectar thicks per SLP (MBSS 8/31/17)   NO thin liquids or orange juice/similar juices with high sugar content  ENDO   SSI (h/o DM2);    Endocrine consulted, adjusted meds with correction of BG\    Discharge rec: Probable discharge today to home health care. + bolus TF Diabetisource 5x/day.     Recommend to discharge home on Lantus 16 units daily.     Novolog 6 units with meals, 10 units if eating a meal + bolus TF.     Novolog 4 units for bolus TF.  + correction scale. Goal 180, ISF 50 for now.     BG monitoring AC/HS and with bolus TF.     RX printed for insulin and pen needles.     Has meter and supplies at home.     Follow up in endocrine clinic with BG logs on 9/19 at 3PM. Message sent to staff.      DISPO:    Consult SW for trach and PEG supplies.    Discharge pending supplies and family transport, likely today

## 2017-09-06 NOTE — PLAN OF CARE
Orders for Tube Feeds for Home Use:     Diabetisource (or equivalent)  Bolus 250mL (1 can) 5 times daily.  Give an additional 100mL water flushes before and after each bolus feed.

## 2017-09-06 NOTE — PLAN OF CARE
Problem: Patient Care Overview  Goal: Plan of Care Review  Outcome: Ongoing (interventions implemented as appropriate)  During shift patient A and O x 4, appropriate and cooperative with care. PRN pain meds x 1 with good effect. All VSS during shift. Patient continues on trach collar. Suctioned by this RN and respiratory PRN. Bolus feed and water administered as ordered, patient tolerating well. No BM during shift, voiding in urinal without issue. Free from falls/injury during shift.

## 2017-09-06 NOTE — SUBJECTIVE & OBJECTIVE
"Interval HPI:   Overnight events: Transition insulin infusion suspended last night for hypoglycemia following dinner. BG markedly above goal this AM. Probable discharge to home today with Bolus TF and home health.   Eating:  Diabetic dental soft diet ~25-50%.    Nausea: No  Hypoglycemia and intervention: Yes- BG 59 last night following dinner.- transition insulin infusion suspended.   Fever: No  TPN and/or TF: Yes  If yes, type of TF/TPN and rate: Bolus TF QID- Impact peptide 300 cc- plan to change to diabetic source 5x/day .     /76 (BP Location: Left arm, Patient Position: Lying)   Pulse 83   Temp 98.7 °F (37.1 °C) (Oral)   Resp 19   Ht 5' 11" (1.803 m)   Wt 61 kg (134 lb 7.7 oz)   SpO2 96%   BMI 18.76 kg/m²     Labs Reviewed and Include    No results for input(s): GLU, CALCIUM, ALBUMIN, PROT, NA, K, CO2, CL, BUN, CREATININE, ALKPHOS, ALT, AST, BILITOT in the last 24 hours.  Lab Results   Component Value Date    WBC 11.55 09/04/2017    HGB 10.2 (L) 09/04/2017    HCT 29.8 (L) 09/04/2017    MCV 88 09/04/2017     09/04/2017     No results for input(s): TSH, FREET4 in the last 168 hours.  Lab Results   Component Value Date    HGBA1C 9.6 (H) 09/03/2017       Nutritional status:   Body mass index is 18.76 kg/m².  Lab Results   Component Value Date    ALBUMIN 2.0 (L) 09/04/2017    ALBUMIN 2.2 (L) 09/03/2017    ALBUMIN 2.4 (L) 09/02/2017     No results found for: PREALBUMIN    Estimated Creatinine Clearance: 97.4 mL/min (based on SCr of 0.8 mg/dL).    Accu-Checks  Recent Labs      09/04/17   2253  09/05/17   0222  09/05/17   0902  09/05/17   1136  09/05/17   1746  09/05/17   1923  09/05/17   2147  09/05/17   2235  09/05/17   2350  09/06/17   0901   POCTGLUCOSE  71  158*  357*  378*  109  172*  59*  120*  136*  388*       Current Medications and/or Treatments Impacting Glycemic Control  Immunotherapy:    Immunosuppressants     None        Steroids:   Hormones     None        Pressors:    Autonomic Drugs "     Start     Stop Route Frequency Ordered    08/31/17 1123  rocuronium injection 50 mg      -- IV On Call Procedure 08/31/17 1123        Hyperglycemia/Diabetes Medications:   Antihyperglycemics     Start     Stop Route Frequency Ordered    09/05/17 1730  insulin aspart pen 10-13 Units      -- SubQ 3 times daily with meals 09/05/17 1719    09/05/17 1022  insulin aspart pen 0-5 Units      -- SubQ Before meals & nightly PRN 09/05/17 0922    09/04/17 1245  insulin regular (Humulin R) 100 Units in sodium chloride 0.9% 100 mL infusion      -- IV Continuous 09/04/17 1143

## 2017-09-06 NOTE — PT/OT/SLP PROGRESS
Speech Language Pathology  Treatment    Luís Esteban   MRN: [id number]   Admitting Diagnosis: Laryngeal mass    Diet recommendations: Solid Diet Level: Dental Soft  Liquid Diet Level: Nectar Thick   · Slow rate  · NO straws  · Upright for PO intake  · Small bites/sips  · Multiple swallows  · Awake/alert for PO intake  · Assistance thickening liquids  · To achieve nectar thick liquid: 4 oz of any liquid to 1 pack of ThickenUp Clear or 6 oz of any liquid to 1 pack of ThickenUp  · Avoid mixed consistencies (soup, cereal with milk, juicy fruits).  · No ice cream, jello, or ice.    PMSV Precautions:  Do not sleep with valve on  Always eat with valve on  Wash with soap and water      SLP Treatment Date: 09/06/17  Speech Start Time: 1130     Speech Stop Time: 1142     Speech Total (min): 12 min       TREATMENT BILLABLE MINUTES:  Treatment Swallowing Dysfunction 12    Has the patient been evaluated by SLP for swallowing? : Yes  Keep patient NPO?: No   General Precautions: Standard, aspiration, fall, nectar thick          Subjective:  Pt awoke easily; pleasant    Pain/Comfort  Pain Rating 1: 0/10  Pain Rating Post-Intervention 1: 0/10    Objective:   Pt indep recalled all PMSV precautions and safe swallow precautions. Pt indep applied PMSV; was off upon entry to room as pt was asleep. Pt with continued breathy phonation; 02 96%. Pt admits to getting all needs met by supplementing verbal expression with gestures and mouthing words.      With PMSV in place, pt tolerated 2 oz nectar thick liquid and 1 saltine cracker without s/s of airway compromise. Oral phase cb mild slowness to mastication that pt states normal 2/2 missing dentition. Pt indep recalled liquid/thickener ratio to achieve nectar consistency. Skilled education provided on aspiration precautions and diet recommendations. Whiteboard updated with diet recommendations/aspiration precautions. PMSV remained in place at end of session. No further questions.                                        Assessment:  Luís Esteban is a 47 y.o. male with a medical diagnosis of Laryngeal mass and presents with PMSV trials; oropharyngeal dysphagia. continued progress towards goals.      Discharge recommendations: Discharge Facility/Level Of Care Needs: home health speech therapy     Goals:    SLP Goals        Problem: SLP Goal    Goal Priority Disciplines Outcome   SLP Goal     SLP    Description:  Speech Language Pathology Goals  Goals expected to be met by 9/8    1. Pt will tolerate nectar thick liquids/dental soft diet without overt s/s of aspiration. ONGOING  2. Pt will tolerate solids x10 without overt s/s of aspiration ONGOING    Speech Language Pathology Goals  Goals expected to be met by 9/12   1. Pt will tolerate nectar thick liquids/dental soft diet without overt s/s of aspiration.   2. Pt will tolerate solids x10 without overt s/s of aspiration   3. Pt will tolerate PMSV during all awake hours without s/s of distress to improve respiration, phonation, and communication.   4. Pt will recall 3/3 PMSV precautions provided min cues to facilitate desensitization and independence.                        Plan:   Patient to be seen Therapy Frequency: 5 x/week   Plan of Care expires: 09/29/17  Plan of Care reviewed with: patient  SLP Follow-up?: Yes           Deja Tellez M.A. CCC-SLP  Speech Language Pathologist  [phone]  9/6/2017

## 2017-09-06 NOTE — DISCHARGE INSTRUCTIONS
Diet:    Diabetic soft diet with nectar thick liquids  · Slow rate  · NO straws  · Upright for PO intake  · Small bites/sips  · Multiple swallows  · Awake/alert for PO intake  · Assistance thickening liquids  · To achieve nectar thick liquid: 4 oz of any liquid to 1 pack of ThickenUp Clear or 6 oz of any liquid to 1 pack of ThickenUp  · Avoid mixed consistencies (soup, cereal with milk, juicy fruits).  · No ice cream, jello, or ice.    Supplement diet with diabetisource (or equivalent)  Bolus 250mL (1 can) 5 times daily.  Give an additional 100mL water flushes before and after each bolus feed.         Activity: No heavy lifting or strenuous activity.     Trach Care: Change inner cannula daily and as needed. Trach care and cleaning twice daily. Suction daily and as needed.    PEG Care: Clean around PEG tube site twice daily and as needed. Keep dry.      Please contact our office if you develop fever, chills, or if you have any problems or concerns. If after hours, ask for the ENT resident on call.  [phone]

## 2017-09-06 NOTE — ASSESSMENT & PLAN NOTE
Mr. Esteban is a 48 yo M with DM and HTN who presents as transfer from [facility] with stridor, hoarseness, and known laryngeal mass with CT evidence of bulky laryngeal disease very concerning for malignancy. No immediate airway risk as patient has had slowly progressive stridor and hoarseness fo r past 2 months. Hypercapnea on ABG.     POD 6 s/p DLB and trach (ENT, Pancho) and bedside PEG (Gen Surg, Goldberg)  Frozen sections c/w invasive SCCa  CT chest with ill-defined pulm nodules, and 1 micronodule (3 mm)     PLAN:  Pneumonia   CXR stable, LL airspace disease   pseudomonas on respiratory cx : pan-sensitive   IM rec Levofloxacin 750 QD x 10 d; started moxifloxacin as Levo not on formulary  S/p Tracheostomy   6.0 DCFS   SLP for PMSV  Laryngeal Cancer   Path: invasive keratinizing poorly differentiated carcinoma   HemOnc and Radonc consulted    Radtx as adjuvant to the laryngectomy, or radtx as definitive treatment with concurrent chemotherapy    will likely need PET/CT as OP    CV   Home anti-HTN meds       FEN/GI   Bolus TF (Impact Peptide 1.5) via PEG   OK for diabetic dental soft and nectar thicks per SLP (MBSS 8/31/17)   NO thin liquids or orange juice/similar juices with high sugar content  ENDO   SSI (h/o DM2); Endocrine consulted, adjusted meds with correction of BG     DISPO:    Consult SW for trach and PEG supplies.    Discharge pending supplies, and resolution/contro of hyperglycemia (Endo recs) and Pneumonia (IM recs)

## 2017-09-06 NOTE — PROGRESS NOTES
"[facility]  Endocrinology  Progress Note    Admit Date: 8/30/2017     Reason for Consult: Management of T2DM, Hyperglycemia     Surgical Procedure and Date: trach 8/30/17    Diabetes diagnosis year: 2009    Home Diabetes Medications:  lantus 20 units nightly, novolog sliding scale    How often checking glucose at home? 1-3 x day   BG readings on regimen: ?  Hypoglycemia on the regimen? ?  Missed doses on regimen?  ?    Diabetes Complications include:     Hyperglycemia and Diabetic peripheral neuropathy     Complicating diabetes co morbidities:   Active Cancer      HPI:   Patient is a 47 y.o. male with a diagnosis of DM type 2 who was transferred to [facility] on 8/30/17 for laryngeal mass.  S/p trach and peg on 8/30/17. Had frozen sections performed during this admission which suggested squamous cell carcinoma. ENT planning for laryngectomy following this hospitalization. Hyperglycemia during this admission and endocrine consulted for bg management.         Interval HPI:   Overnight events: Transition insulin infusion suspended last night for hypoglycemia following dinner. BG markedly above goal this AM. Probable discharge to home today with Bolus TF and home health.   Eating:  Diabetic dental soft diet ~25-50%.    Nausea: No  Hypoglycemia and intervention: Yes- BG 59 last night following dinner.- transition insulin infusion suspended.   Fever: No  TPN and/or TF: Yes  If yes, type of TF/TPN and rate: Bolus TF QID- Impact peptide 300 cc- plan to change to diabetic source 5x/day .     /76 (BP Location: Left arm, Patient Position: Lying)   Pulse 83   Temp 98.7 °F (37.1 °C) (Oral)   Resp 19   Ht 5' 11" (1.803 m)   Wt 61 kg (134 lb 7.7 oz)   SpO2 96%   BMI 18.76 kg/m²       Labs Reviewed and Include    No results for input(s): GLU, CALCIUM, ALBUMIN, PROT, NA, K, CO2, CL, BUN, CREATININE, ALKPHOS, ALT, AST, BILITOT in the last 24 hours.  Lab Results   Component Value Date    WBC 11.55 " 09/04/2017    HGB 10.2 (L) 09/04/2017    HCT 29.8 (L) 09/04/2017    MCV 88 09/04/2017     09/04/2017     No results for input(s): TSH, FREET4 in the last 168 hours.  Lab Results   Component Value Date    HGBA1C 9.6 (H) 09/03/2017       Nutritional status:   Body mass index is 18.76 kg/m².  Lab Results   Component Value Date    ALBUMIN 2.0 (L) 09/04/2017    ALBUMIN 2.2 (L) 09/03/2017    ALBUMIN 2.4 (L) 09/02/2017     No results found for: PREALBUMIN    Estimated Creatinine Clearance: 97.4 mL/min (based on SCr of 0.8 mg/dL).    Accu-Checks  Recent Labs      09/04/17   2253  09/05/17   0222  09/05/17   0902  09/05/17   1136  09/05/17   1746  09/05/17   1923  09/05/17   2147  09/05/17   2235  09/05/17   2350  09/06/17   0901   POCTGLUCOSE  71  158*  357*  378*  109  172*  59*  120*  136*  388*       Current Medications and/or Treatments Impacting Glycemic Control  Immunotherapy:    Immunosuppressants     None        Steroids:   Hormones     None        Pressors:    Autonomic Drugs     Start     Stop Route Frequency Ordered    08/31/17 1123  rocuronium injection 50 mg      -- IV On Call Procedure 08/31/17 1123        Hyperglycemia/Diabetes Medications:   Antihyperglycemics     Start     Stop Route Frequency Ordered    09/05/17 1730  insulin aspart pen 10-13 Units      -- SubQ 3 times daily with meals 09/05/17 1719    09/05/17 1022  insulin aspart pen 0-5 Units      -- SubQ Before meals & nightly PRN 09/05/17 0922    09/04/17 1245  insulin regular (Humulin R) 100 Units in sodium chloride 0.9% 100 mL infusion      -- IV Continuous 09/04/17 1143          ASSESSMENT and PLAN    DM (diabetes mellitus), type 2, uncontrolled    BG goal 140-180. Plans to d/c home today.  Has basal needs. + hypoglycemia yesterday. On DM diet with bolus TF.   D/c transition insulin infusion.   Start Levemir 16 units daily this AM.   Novolog 6 units with meals, 10 units if eating a meal + bolus TF.   Novolog 4 units for bolus TF.   BG  monitoring AC/HS/MN.     ADDENDUM 1600: unsure if patient will be discharged today as he is still waiting on a ride home. Currently receiving TF at 6 AM, noon, 1800, and MN. No changes to TF orders while admitted.  Will add Novolog 4 units tonight at MN with TF bolus. Hold if TF bolus held or if BG < 100. Discussed nursing staff.     Discharge rec: Probable discharge today to home health care. + bolus TF Diabetisource 5x/day.   Recommend to discharge home on Lantus 16 units daily.   Novolog 6 units with meals, 10 units if eating a meal + bolus TF.   Novolog 4 units for bolus TF.  + correction scale. Goal 180, ISF 50 for now.   BG monitoring AC/HS and with bolus TF.   RX printed for insulin and pen needles given to patient   Has meter and supplies at home.   Follow up in endocrine clinic with BG logs on 9/19 at 3PM. Message sent to staff for appointment.             Squamous cell carcinoma of larynx    S/p laryngectomy           On enteral nutrition    Bolus feeds.   Impacts insulin needs.          Pneumonia due to Pseudomonas species    Infection can increase BG readings.   On antibiotics per primary team.             Spent more than 30 minutes at the bedside discussing plan of care for discharge and follow up. Logs with written instructions provided.        Krista Garcia NP  Endocrinology  [facility]

## 2017-09-06 NOTE — PLAN OF CARE
SW was informed in morning huddle that Pt was on an insulin drip and likely wouldn't be discharge ready until tomorrow or Friday.  However, SMILEY later received a call from ENT Resident who stated Pt was off the insulin drip and could possibly discharge today if everything had been set up. SW confirmed with Pt's nurse that Pt's trach supplies had been delivered to his room. SW is attempting to find a home tube feed provider. SMILEY reached out to [facility] with [facility] who stated their [place] office would have to submit for authorization and would then need a [place] Md to follow Pt. SMILEY informed CM, Marjorie Perry, of this. Pt reportedly does not have a PCP. SMILEY and CM to continue to research options.     Shirley Sierra, [facility]    Update: 2:18pm - DUTCH spoke to Pt and set him up with a [place] PCP. SMILEY sent all necessary information to [facility] via [facility] to initiate referral.

## 2017-09-06 NOTE — PLAN OF CARE
SW was informed by Ghazala at [facility] ([phone]) that Pt was approved for his home tube feeds and he had been taught. However, they would not be able to deliver to Pt until tomorrow. SMILEY asked Pt's nurse to please sent Pt home with enough formula to get him through until tomorrow afternoon. SMILEY also asked nurse to please see if Respiratory could come in to teach Pt trach care and how to use his home suction, etc. Prior to discharge, Pt's nurse agreed to do so. SMILEY saw ENT Resident, Dr. Dale, in the [place]orton and informed him of above.     Shirley Sierra LCSW

## 2017-09-06 NOTE — PLAN OF CARE
Problem: SLP Goal  Goal: SLP Goal  Speech Language Pathology Goals  Goals expected to be met by 9/8    1. Pt will tolerate nectar thick liquids/dental soft diet without overt s/s of aspiration. ONGOING  2. Pt will tolerate solids x10 without overt s/s of aspiration ONGOING    Speech Language Pathology Goals  Goals expected to be met by 9/12   1. Pt will tolerate nectar thick liquids/dental soft diet without overt s/s of aspiration.   2. Pt will tolerate solids x10 without overt s/s of aspiration   3. Pt will tolerate PMSV during all awake hours without s/s of distress to improve respiration, phonation, and communication.   4. Pt will recall 3/3 PMSV precautions provided min cues to facilitate desensitization and independence.        Pt with continued progress towards goals.    Deja Tellez M.A. CCC-SLP  Speech Language Pathologist  [phone]  9/6/2017

## 2017-09-07 ENCOUNTER — TELEPHONE (OUTPATIENT)
Dept: ENDOCRINOLOGY | Facility: CLINIC | Age: 47
End: 2017-09-07

## 2017-09-07 VITALS
DIASTOLIC BLOOD PRESSURE: 78 MMHG | TEMPERATURE: 98 F | OXYGEN SATURATION: 94 % | HEIGHT: 71 IN | SYSTOLIC BLOOD PRESSURE: 132 MMHG | BODY MASS INDEX: 18.83 KG/M2 | HEART RATE: 90 BPM | RESPIRATION RATE: 16 BRPM | WEIGHT: 134.5 LBS

## 2017-09-07 LAB
POCT GLUCOSE: 193 MG/DL (ref 70–110)
POCT GLUCOSE: 397 MG/DL (ref 70–110)

## 2017-09-07 PROCEDURE — 63600175 PHARM REV CODE 636 W HCPCS

## 2017-09-07 PROCEDURE — 63600175 PHARM REV CODE 636 W HCPCS: Performed by: NURSE PRACTITIONER

## 2017-09-07 PROCEDURE — 27000221 HC OXYGEN, UP TO 24 HOURS

## 2017-09-07 PROCEDURE — 25000003 PHARM REV CODE 250

## 2017-09-07 PROCEDURE — 94761 N-INVAS EAR/PLS OXIMETRY MLT: CPT

## 2017-09-07 PROCEDURE — 92609 USE OF SPEECH DEVICE SERVICE: CPT

## 2017-09-07 PROCEDURE — 99900026 HC AIRWAY MAINTENANCE (STAT)

## 2017-09-07 PROCEDURE — 92526 ORAL FUNCTION THERAPY: CPT

## 2017-09-07 RX ORDER — INSULIN ASPART 100 [IU]/ML
6 INJECTION, SOLUTION INTRAVENOUS; SUBCUTANEOUS
Status: DISCONTINUED | OUTPATIENT
Start: 2017-09-08 | End: 2017-09-07 | Stop reason: HOSPADM

## 2017-09-07 RX ORDER — INSULIN ASPART 100 [IU]/ML
8-13 INJECTION, SOLUTION INTRAVENOUS; SUBCUTANEOUS
Status: DISCONTINUED | OUTPATIENT
Start: 2017-09-07 | End: 2017-09-07 | Stop reason: HOSPADM

## 2017-09-07 RX ADMIN — CIPROFLOXACIN HYDROCHLORIDE 750 MG: 750 TABLET, FILM COATED ORAL at 09:09

## 2017-09-07 RX ADMIN — OXYCODONE HYDROCHLORIDE 10 MG: 5 SOLUTION ORAL at 06:09

## 2017-09-07 RX ADMIN — INSULIN ASPART 5 UNITS: 100 INJECTION, SOLUTION INTRAVENOUS; SUBCUTANEOUS at 10:09

## 2017-09-07 NOTE — NURSING
Peripheral IV noted to be  and patient declining reinsertion due to pending discharge in am. Overnight coverage contacted and made aware, IVF ashlee'd.

## 2017-09-07 NOTE — PLAN OF CARE
Mr. Esteban is a 46 yo M with DM and HTN and invasive squamous cell carcinoma s/p laryngectomy on tracheostomy tube, now with HCAP, resp culture showing pseudomonas.      Interval history: Patient remains afebrile. No acute events over night.     Pneumonia due to Pseudomonas species       46 yo males with hx of laryngeal ca s/p laryngectomy, now with HCAP  -Respiratory culture showing pseudomonas/gram negative rods and right lobe consolidation on chest xray  -Ciprofloxacin 750 mg, BID for 10 days.   -patient pending discharge       I will sign off. Please contact if you have any questions. Thank you for your consult.    Bo Cardoza MD  Department of Hospital Medicine   [facility]

## 2017-09-07 NOTE — PT/OT/SLP PROGRESS
Speech Language Pathology  Treatment    Luís Esteban   MRN: [id number]   Admitting Diagnosis: Squamous cell carcinoma of larynx    Diet recommendations: Solid Diet Level: Dental Soft  Liquid Diet Level: Nectar Thick   · Slow rate  · NO straws  · Upright for PO intake  · Small bites/sips  · Multiple swallows  · Awake/alert for PO intake  · Assistance thickening liquids  · To achieve nectar thick liquid: 4 oz of any liquid to 1 pack of ThickenUp Clear or 6 oz of any liquid to 1 pack of ThickenUp  · Avoid mixed consistencies (soup, cereal with milk, juicy fruits).  · No ice cream, jello, or ice.    PMSV Precautions:  Do not sleep with valve on  Always eat with valve on  Wash with soap and water      SLP Treatment Date: 09/07/17  Speech Start Time: 0912     Speech Stop Time: 0933     Speech Total (min): 21 min       TREATMENT BILLABLE MINUTES:  Treatment Swallowing Dysfunction 12 and Therapeutic Speech Device 9    Has the patient been evaluated by SLP for swallowing? : Yes  Keep patient NPO?: No   General Precautions: Standard, aspiration, fall, nectar thick          Subjective:  Pt awake; pleasant    Pain/Comfort  Pain Rating 1: 0/10  Pain Rating Post-Intervention 1: 0/10    Objective:   Upon entry to room, pt requesting different food for breakfast; indep applied PMSV. Provided cereal with milk. Education provided to pt on the need to thicken milk prior to pouring into cereal. Pt indep thickened liquid using measuring cup to nectar consistency. Pt tolerated 2 bites dental soft/nectar thick liquid mixed consistency (cereal and milk) without s/s of airway compromise; 02 97%. Oral phase cb mild excess to mastication likely 2/2 dentition rather than function.  Skilled education provided on aspiration precautions and diet recommendations, including on where to purchase thickener after DC; written instructions provided on rec'd liquid consistency (nectar). Pt indep taughtback all PMSV precautions and safe swallow  precautions; min cue provided to remember consistency name 'nectar.' Pt admits to getting all needs met by supplementing verbal expression with gestures and mouthing words; phonation remains breathy.  PMSV remained in place throughout entire session and after session completed. RN in room providing education on diabetic diet; SLP supported as appropriate. Pt requesting additional PMSV. Whiteboard updated with diet recommendations/aspiration precautions. PMSV remained in place at end of session. No further questions.                                       Assessment:  Luís Esteban is a 47 y.o. male with a medical diagnosis of Squamous cell carcinoma of larynx and presents with PMSV trials; oropharyngeal dysphagia. continued progress towards goals.      Discharge recommendations: Discharge Facility/Level Of Care Needs: home health speech therapy     Goals:    SLP Goals        Problem: SLP Goal    Goal Priority Disciplines Outcome   SLP Goal     SLP    Description:  Speech Language Pathology Goals  Goals expected to be met by 9/8    1. Pt will tolerate nectar thick liquids/dental soft diet without overt s/s of aspiration. ONGOING  2. Pt will tolerate solids x10 without overt s/s of aspiration ONGOING    Speech Language Pathology Goals  Goals expected to be met by 9/12   1. Pt will tolerate nectar thick liquids/dental soft diet without overt s/s of aspiration.   2. Pt will tolerate solids x10 without overt s/s of aspiration   3. Pt will tolerate PMSV during all awake hours without s/s of distress to improve respiration, phonation, and communication.   4. Pt will recall 3/3 PMSV precautions provided min cues to facilitate desensitization and independence.                        Plan:   Patient to be seen Therapy Frequency: 5 x/week   Plan of Care expires: 09/29/17  Plan of Care reviewed with: patient  SLP Follow-up?: Yes           Deja Tellez M.A. CCC-SLP  Speech Language Pathologist  [phone]  [phone]  9/7/2017

## 2017-09-07 NOTE — PLAN OF CARE
Problem: Patient Care Overview  Goal: Plan of Care Review  Outcome: Ongoing (interventions implemented as appropriate)  Discharge instructions discussed with patient. Verbalizes understanding. Paperwork and prescriptions given to patient. Medications reconciled. IV d/c'd during PM shift. Tolerated well. Paperwork and instruction on Trach care and Tube Feeds discussed with patient and family members. Equipment reviewed and questions answered. VSS on RA. No acute distress noted. Awaiting transport.

## 2017-09-07 NOTE — PLAN OF CARE
Patient discharging home with trach supplies and tube feeds with "[employer]/[facility].  Follow up appts on AVS.    Future Appointments  Date Time Provider Department Center   9/14/2017 10:30 AM Ryann M. Thad, NP [facility] [address]   9/19/2017 3:00 PM Krista M. Misiak, NP [facility] [address]     And with Apurva Meyer NP - 9/25/2017 @ 8:20am at [facility] in [place].       09/07/17 1112   Final Note   Assessment Type Final Discharge Note   Discharge Disposition Home   Hospital Follow Up  Appt(s) scheduled? Yes   Right Care Referral Info   Post Acute Recommendation Other  (trach supplies)

## 2017-09-07 NOTE — PROGRESS NOTES
[facility]  Otorhinolaryngology-Head & Neck Surgery  Progress Note    Subjective:     Post-Op Info:  Procedure(s) (LRB):  TRACHEOTOMY- AWAKE (N/A)  LARYNGOSCOPY (N/A)   7 Days Post-Op  Hospital Day: 9     Interval History: NAEON. glory diabetic soft dental diet and bolus TF's. Glu controlled with adjustments per Endocrine; thick/malodorous secretions via trach     Medications:  Continuous Infusions:     Scheduled Meds:   ciprofloxacin HCl  750 mg Oral Q12H    enoxaparin  40 mg Subcutaneous Daily    insulin aspart  4 Units Subcutaneous Q24H    insulin aspart  6-10 Units Subcutaneous TIDWM    insulin detemir  16 Units Subcutaneous Daily    sodium chloride 0.9%  3 mL Intravenous Q8H     PRN Meds:dextrose 50%, dextrose 50%, diphenhydrAMINE, fentaNYL, glucagon (human recombinant), glucose, glucose, hydrALAZINE, HYDROmorphone, insulin aspart, midazolam (PF), ondansetron, oxycodone, oxycodone, rocuronium     Review of patient's allergies indicates:  No Known Allergies  Objective:     Vital Signs (24h Range):  Temp:  [98.3 °F (36.8 °C)-99.2 °F (37.3 °C)] 98.5 °F (36.9 °C)  Pulse:  [] 89  Resp:  [16-20] 16  SpO2:  [92 %-98 %] 95 %  BP: (118-139)/(67-78) 128/76        Lines/Drains/Airways     Drain                 Gastrostomy/Enterostomy 08/31/17 1900 6 days          Airway                 Surgical Airway 08/31/17 0734 Shiley Cuffed 6 days          Peripheral Intravenous Line                 Peripheral IV - Single Lumen 08/30/17 2100 Left Wrist 7 days                Physical Exam    NAD, aao x 3  No resp distress on humidified 02 via trach collar   with thick discolored malodorous secretions  6.0 DCFS; phonates with PMSV   G-tube  Neck soft    Significant Labs:    CBC    Recent Labs  Lab 09/04/17  0858   WBC 11.55   HGB 10.2*   HCT 29.8*   MCV 88        BMP    Recent Labs  Lab 09/04/17  0858   *   *   K 4.1   CL 96   CO2 26   BUN 26*   CREATININE 0.8   CALCIUM 9.6      COAGS  No results for input(s): PROTIME, INR, PTT in the last 72 hours.    Results for NELLIE BLOOM (MRN [id number]) as of 9/7/2017 06:45   Ref. Range 9/5/2017 23:50 9/6/2017 09:01 9/6/2017 12:44 9/6/2017 18:11 9/6/2017 23:05   POCT Glucose Latest Ref Range: 70 - 110 mg/dL 136 (H) 388 (H) 285 (H) 180 (H) 193 (H)     Significant Diagnostics:  CXR 9/4/17- Increasing bilateral lung consolidation consistent with a pneumonia.    CXR 9/5/17 There is cardiac megaly, moderate edema, and no change.    MBSS 8/31 - There is penetration with honey thick liquid and aspiration with thin liquids.    Assessment/Plan:     * Squamous cell carcinoma of larynx    Mr. Bloom is a 48 yo M with DM and HTN who presents as transfer from [facility] with stridor, hoarseness, and known laryngeal mass with CT evidence of bulky laryngeal disease very concerning for malignancy. No immediate airway risk as patient has had slowly progressive stridor and hoarseness fo r past 2 months. Hypercapnea on ABG.     POD 7 s/p DLB and trach (ENT, [facility]anch[facility]) and bedside PEG (Gen Surg, [facility])  Frozen sections c/w invasive SCCa  CT chest with ill-defined pulm nodules, and 1 micronodule (3 mm)     PLAN:  Pneumonia (Present on admission)   CXR stable, LL airspace disease   pseudomonas on respiratory cx : pan-sensitive   IM rec cefepime while inpatient, Ciprofloxacin as outpatient  S/p Tracheostomy   Trach care for 6.0 DCFS   SLP for PMSV  Laryngeal Cancer   Path: invasive keratinizing poorly differentiated carcinoma   HemOnc and Radonc consulted    Radtx as adjuvant to the laryngectomy, or radtx as definitive treatment with concurrent chemotherapy    will likely need PET/CT as OP    CV   Home anti-HTN meds       FEN/GI   Diabetasource (nutertion recs) via PEG   OK for diabetic dental soft and nectar thicks per SLP (MBSS 8/31/17)   NO thin liquids or orange juice/similar juices with high sugar content  ENDO   SSI (h/o DM2);    Endocrine  consulted, adjusted meds with correction of BG\    Discharge rec: Probable discharge today to home health care. + bolus TF Diabetisource 5x/day.     Recommend to discharge home on Lantus 16 units daily.     Novolog 6 units with meals, 10 units if eating a meal + bolus TF.     Novolog 4 units for bolus TF.  + correction scale. Goal 180, ISF 50 for now.     BG monitoring AC/HS and with bolus TF.     RX printed for insulin and pen needles.     Has meter and supplies at home.     Follow up in endocrine clinic with BG logs on 9/19 at 3PM. Message sent to staff.      DISPO:    Consult  for trach and PEG supplies.    Discharge pending supplies and family transport, likely today            Wiliam Meeks MD  Otorhinolaryngology-Head & Neck Surgery  [facility]

## 2017-09-07 NOTE — SUBJECTIVE & OBJECTIVE
"Interval HPI:       /78 (BP Location: Left arm, Patient Position: Lying)   Pulse 90   Temp 97.7 °F (36.5 °C) (Oral)   Resp 16   Ht 5' 11" (1.803 m)   Wt 61 kg (134 lb 7.7 oz)   SpO2 (!) 94%   BMI 18.76 kg/m²     Labs Reviewed and Include    No results for input(s): GLU, CALCIUM, ALBUMIN, PROT, NA, K, CO2, CL, BUN, CREATININE, ALKPHOS, ALT, AST, BILITOT in the last 24 hours.  Lab Results   Component Value Date    WBC 11.55 09/04/2017    HGB 10.2 (L) 09/04/2017    HCT 29.8 (L) 09/04/2017    MCV 88 09/04/2017     09/04/2017     No results for input(s): TSH, FREET4 in the last 168 hours.  Lab Results   Component Value Date    HGBA1C 9.6 (H) 09/03/2017       Nutritional status:   Body mass index is 18.76 kg/m².  Lab Results   Component Value Date    ALBUMIN 2.0 (L) 09/04/2017    ALBUMIN 2.2 (L) 09/03/2017    ALBUMIN 2.4 (L) 09/02/2017     No results found for: PREALBUMIN    Estimated Creatinine Clearance: 97.4 mL/min (based on SCr of 0.8 mg/dL).    Accu-Checks  Recent Labs      09/05/17   1136  09/05/17   1746  09/05/17   1923  09/05/17   2147  09/05/17   2235  09/05/17   2350  09/06/17   0901  09/06/17   1244  09/06/17   1811  09/06/17   2305   POCTGLUCOSE  378*  109  172*  59*  120*  136*  388*  285*  180*  193*       Current Medications and/or Treatments Impacting Glycemic Control  Immunotherapy:    Immunosuppressants     None        Steroids:   Hormones     None        Pressors:    Autonomic Drugs     Start     Stop Route Frequency Ordered    08/31/17 1123  rocuronium injection 50 mg      -- IV On Call Procedure 08/31/17 1123        Hyperglycemia/Diabetes Medications:   Antihyperglycemics     Start     Stop Route Frequency Ordered    09/07/17 0000  insulin aspart pen 4 Units      -- SubQ Every 24 hours (non-standard times) 09/06/17 1620    09/06/17 1130  insulin aspart pen 6-10 Units      -- SubQ 3 times daily with meals 09/06/17 0936    09/06/17 1036  insulin aspart pen 0-5 Units      -- SubQ " Before meals & nightly PRN 09/06/17 0936    09/06/17 1030  insulin detemir pen 16 Units      -- SubQ Daily 09/06/17 0936

## 2017-09-07 NOTE — PLAN OF CARE
Problem: SLP Goal  Goal: SLP Goal  Speech Language Pathology Goals  Goals expected to be met by 9/8    1. Pt will tolerate nectar thick liquids/dental soft diet without overt s/s of aspiration. ONGOING  2. Pt will tolerate solids x10 without overt s/s of aspiration ONGOING    Speech Language Pathology Goals  Goals expected to be met by 9/12   1. Pt will tolerate nectar thick liquids/dental soft diet without overt s/s of aspiration.   2. Pt will tolerate solids x10 without overt s/s of aspiration   3. Pt will tolerate PMSV during all awake hours without s/s of distress to improve respiration, phonation, and communication.   4. Pt will recall 3/3 PMSV precautions provided min cues to facilitate desensitization and independence.        Pt with continued progress towards goals.    Deja Tellez M.A. CCC-SLP  Speech Language Pathologist  [phone]  9/7/2017

## 2017-09-07 NOTE — PLAN OF CARE
Problem: Patient Care Overview  Goal: Plan of Care Review  Outcome: Ongoing (interventions implemented as appropriate)  During shift patient A and O x 4, appropriate and cooperative with care. PRN pain meds x 1 with good effect. All VSS during shift. Patient continues on trach collar. Suctioned by this RN and respiratory PRN. Bolus feed and water administered as ordered, patient tolerating well. No BM during shift, voiding in urinal without issue. Free from falls/injury during shift. Plan for discharge on upcoming shift.

## 2017-09-07 NOTE — ASSESSMENT & PLAN NOTE
Mr. Esteban is a 48 yo M with DM and HTN who presents as transfer from [facility] with stridor, hoarseness, and known laryngeal mass with CT evidence of bulky laryngeal disease very concerning for malignancy. No immediate airway risk as patient has had slowly progressive stridor and hoarseness fo r past 2 months. Hypercapnea on ABG.     POD 7 s/p DLB and trach (ENT, [facility]) and bedside PEG (Gen Surg, [facility])  Frozen sections c/w invasive SCCa  CT chest with ill-defined pulm nodules, and 1 micronodule (3 mm)     PLAN:  Pneumonia present on admission.    CXR stable, LL airspace disease   pseudomonas on respiratory cx : pan-sensitive   IM rec cefepime while inpatient, Ciprofloxacin as outpatient  S/p Tracheostomy   Trach care for 6.0 DCFS   SLP for PMSV  Laryngeal Cancer   Path: invasive keratinizing poorly differentiated carcinoma   HemOnc and Radonc consulted    Radtx as adjuvant to the laryngectomy, or radtx as definitive treatment with concurrent chemotherapy    will likely need PET/CT as OP    CV   Home anti-HTN meds       FEN/GI   Diabetasource (nutertiion recs) via PEG   OK for diabetic dental soft and nectar thicks per SLP (MBSS 8/31/17)   NO thin liquids or orange juice/similar juices with high sugar content  ENDO   SSI (h/o DM2);    Endocrine consulted, adjusted meds with correction of BG\    Discharge rec: Probable discharge today to home health care. + bolus TF Diabetisource 5x/day.     Recommend to discharge home on Lantus 16 units daily.     Novolog 6 units with meals, 10 units if eating a meal + bolus TF.     Novolog 4 units for bolus TF.  + correction scale. Goal 180, ISF 50 for now.     BG monitoring AC/HS and with bolus TF.     RX printed for insulin and pen needles.     Has meter and supplies at home.     Follow up in endocrine clinic with BG logs on 9/19 at 3PM. Message sent to staff.      DISPO:    Consult SW for trach and PEG supplies.    Discharge pending supplies and family  transport, likely today

## 2017-09-07 NOTE — SUBJECTIVE & OBJECTIVE
Interval History: NAEON. lgory diabetic soft dental diet and bolus TF's. Glu controlled with adjustments per Endocrine; thick/malodorous secretions via trach     Medications:  Continuous Infusions:     Scheduled Meds:   ciprofloxacin HCl  750 mg Oral Q12H    enoxaparin  40 mg Subcutaneous Daily    insulin aspart  4 Units Subcutaneous Q24H    insulin aspart  6-10 Units Subcutaneous TIDWM    insulin detemir  16 Units Subcutaneous Daily    sodium chloride 0.9%  3 mL Intravenous Q8H     PRN Meds:dextrose 50%, dextrose 50%, diphenhydrAMINE, fentaNYL, glucagon (human recombinant), glucose, glucose, hydrALAZINE, HYDROmorphone, insulin aspart, midazolam (PF), ondansetron, oxycodone, oxycodone, rocuronium     Review of patient's allergies indicates:  No Known Allergies  Objective:     Vital Signs (24h Range):  Temp:  [98.3 °F (36.8 °C)-99.2 °F (37.3 °C)] 98.5 °F (36.9 °C)  Pulse:  [] 89  Resp:  [16-20] 16  SpO2:  [92 %-98 %] 95 %  BP: (118-139)/(67-78) 128/76        Lines/Drains/Airways     Drain                 Gastrostomy/Enterostomy 08/31/17 1900 6 days          Airway                 Surgical Airway 08/31/17 0734 Shiley Cuffed 6 days          Peripheral Intravenous Line                 Peripheral IV - Single Lumen 08/30/17 2100 Left Wrist 7 days                Physical Exam    NAD, aao x 3  No resp distress on humidified 02 via trach collar   with thick discolored malodorous secretions  6.0 DCFS; phonates with PMSV   G-tube  Neck soft    Significant Labs:    CBC    Recent Labs  Lab 09/04/17  0858   WBC 11.55   HGB 10.2*   HCT 29.8*   MCV 88        BMP    Recent Labs  Lab 09/04/17  0858   *   *   K 4.1   CL 96   CO2 26   BUN 26*   CREATININE 0.8   CALCIUM 9.6     COAGS  No results for input(s): PROTIME, INR, PTT in the last 72 hours.    Results for NELLIE BLOOM (MRN [id number]) as of 9/7/2017 06:45   Ref. Range 9/5/2017 23:50 9/6/2017 09:01 9/6/2017 12:44 9/6/2017 18:11 9/6/2017 23:05    POCT Glucose Latest Ref Range: 70 - 110 mg/dL 136 (H) 388 (H) 285 (H) 180 (H) 193 (H)     Significant Diagnostics:  CXR 9/4/17- Increasing bilateral lung consolidation consistent with a pneumonia.    CXR 9/5/17 There is cardiac megaly, moderate edema, and no change.    MBSS 8/31 - There is penetration with honey thick liquid and aspiration with thin liquids.

## 2017-09-07 NOTE — TELEPHONE ENCOUNTER
----- Message from MAYO Wang, TIMP sent at 9/7/2017  3:26 PM CDT -----  Contact: [facility]  Ok the npi is [phone], thanks.  ----- Message -----  From: Shirley Rincon  Sent: 9/7/2017   3:10 PM  To: Krista Garcia, NP    [phone]   [facility] pharmacy     novolog &    levemir   Scripts      NEED ALLAN NUMBER TO PROCESS      Please call them thanks

## 2017-09-08 ENCOUNTER — TELEPHONE (OUTPATIENT)
Dept: ENDOCRINOLOGY | Facility: CLINIC | Age: 47
End: 2017-09-08

## 2017-09-08 NOTE — TELEPHONE ENCOUNTER
----- Message from Loree Carpenter sent at 9/7/2017  4:11 PM CDT -----  Contact: Pt's sister Pat  Pt's sister is calling in regards to pt's prescriptions he left the hospital with.Per sister [facility] states that they need a code because  isn't in the system there. [facility] number is  [phone].    Pt's sister can be reached at [phone].    Thank you.

## 2017-09-12 DIAGNOSIS — C32.9 SQUAMOUS CELL CARCINOMA OF LARYNX: Primary | ICD-10-CM

## 2017-09-14 ENCOUNTER — OFFICE VISIT (OUTPATIENT)
Dept: OTOLARYNGOLOGY | Facility: CLINIC | Age: 47
End: 2017-09-14
Payer: MEDICAID

## 2017-09-14 ENCOUNTER — CLINICAL SUPPORT (OUTPATIENT)
Dept: SPEECH THERAPY | Facility: HOSPITAL | Age: 47
End: 2017-09-14
Payer: MEDICAID

## 2017-09-14 VITALS
DIASTOLIC BLOOD PRESSURE: 64 MMHG | WEIGHT: 132.25 LBS | BODY MASS INDEX: 18.45 KG/M2 | SYSTOLIC BLOOD PRESSURE: 102 MMHG | HEART RATE: 78 BPM

## 2017-09-14 DIAGNOSIS — C32.9 LARYNGEAL CANCER: ICD-10-CM

## 2017-09-14 DIAGNOSIS — C32.9 SQUAMOUS CELL CARCINOMA OF LARYNX: Primary | ICD-10-CM

## 2017-09-14 DIAGNOSIS — Z93.0 TRACHEOSTOMY DEPENDENCE: ICD-10-CM

## 2017-09-14 DIAGNOSIS — R49.0 DYSPHONIA: Primary | ICD-10-CM

## 2017-09-14 PROBLEM — R06.1 STRIDOR: Status: RESOLVED | Noted: 2017-08-30 | Resolved: 2017-09-14

## 2017-09-14 PROCEDURE — G9171 VOICE CURRENT STATUS: HCPCS | Mod: GN,CL | Performed by: SPEECH-LANGUAGE PATHOLOGIST

## 2017-09-14 PROCEDURE — 92524 BEHAVRAL QUALIT ANALYS VOICE: CPT | Mod: GN | Performed by: SPEECH-LANGUAGE PATHOLOGIST

## 2017-09-14 PROCEDURE — 99999 PR PBB SHADOW E&M-EST. PATIENT-LVL III: CPT | Mod: PBBFAC,,, | Performed by: NURSE PRACTITIONER

## 2017-09-14 PROCEDURE — G9172 VOICE GOAL STATUS: HCPCS | Mod: GN,CN | Performed by: SPEECH-LANGUAGE PATHOLOGIST

## 2017-09-14 PROCEDURE — 99213 OFFICE O/P EST LOW 20 MIN: CPT | Mod: PBBFAC | Performed by: NURSE PRACTITIONER

## 2017-09-14 PROCEDURE — 99024 POSTOP FOLLOW-UP VISIT: CPT | Mod: ,,, | Performed by: NURSE PRACTITIONER

## 2017-09-14 PROCEDURE — G9173 VOICE D/C STATUS: HCPCS | Mod: GN,CL | Performed by: SPEECH-LANGUAGE PATHOLOGIST

## 2017-09-14 NOTE — PROGRESS NOTES
Subjective:       Patient ID: Luís Esteban is a 47 y.o. male.    Chief Complaint: post op/ ankle and feet swelling since surgery    Treatment History:  1) DL with biopsy, [facility], June  2) Awake trach with DL and biopsy, Dr. Deleon, 8/31/17    HPI     Luís Esteban presents with the above treatment history. He has been doing well since being discharged home last week. His pain is well controlled. He reports frequent cough and increased mucus production. He is doing well with his tube feeds. He states that his left foot began to swell several days ago. It is painful and red. He notes that the right foot is swollen as well, but is not bothering him as much. His family is with him today.    He was transferred to [facility] from [facility] with stridor, hoarseness, and known laryngeal mass. Patient reports SOB, orthopnea, and hoarseness for past 2 months. He also reports about 2 months ago he was seen by ENT who performed biopsy of vocal cord mass showing benign lesion. Patient reports he was supposed to follow-up, but hasn't been able to recently. Endorses 20 pound weight loss in past 2-3 months. Also reports some difficulty eating certain foods, but no issue otherwise with swallowing. Patient was smoker, but quit 16 years ago. CT scan on disc from OSH showed laryngeal mass with airway narrowing. He was admitted to the ICU and underwent awake trach with biopsy 2 weeks ago. Path revealed SCC.    Past Medical History:   Diagnosis Date    Insulin dependent diabetes mellitus        History reviewed. No pertinent surgical history.      Current Outpatient Prescriptions:     insulin aspart (NOVOLOG) 100 unit/mL InPn pen, Inject 6 units with meals, 10 units with meals + bolus Tube feeding, 4 units for just bolus tube feeding. + correction scale., Disp: 1 Box, Rfl: 1    insulin detemir (LEVEMIR FLEXTOUCH) 100 unit/mL (3 mL) SubQ InPn pen, Inject 16 Units into the skin once daily., Disp: 1 Box, Rfl:  "1    levoFLOXacin (LEVAQUIN) 750 MG tablet, Take 1 tablet (750 mg total) by mouth once daily., Disp: 10 tablet, Rfl: 0    oxycodone (ROXICODONE) 5 mg/5 mL Soln, 10 mLs (10 mg total) by Per G Tube route every 3 (three) hours as needed., Disp: 473 mL, Rfl: 0    pen needle, diabetic (BD ULTRA-FINE OLESYA PEN NEEDLES) 32 gauge x 5/32" Ndle, To use 6 times daily with insulin injections., Disp: 200 each, Rfl: 1    Review of patient's allergies indicates:  No Known Allergies    Social History     Social History    Marital status: Single     Spouse name: N/A    Number of children: N/A    Years of education: N/A     Occupational History    Not on file.     Social History Main Topics    Smoking status: Former Smoker    Smokeless tobacco: Never Used    Alcohol use Not on file    Drug use: Unknown    Sexual activity: Not on file     Other Topics Concern    Not on file     Social History Narrative    No narrative on file       History reviewed. No pertinent family history.      Review of Systems   Constitutional: Positive for unexpected weight change. Negative for appetite change, chills, diaphoresis, fatigue and fever.   HENT: Positive for trouble swallowing and voice change. Negative for congestion, dental problem, drooling, ear discharge, ear pain, facial swelling, hearing loss, mouth sores, nosebleeds, postnasal drip, rhinorrhea, sinus pressure, sneezing, sore throat and tinnitus.    Eyes: Negative for pain, discharge, redness and itching.   Respiratory: Positive for cough. Negative for shortness of breath.    Cardiovascular: Negative for chest pain.   Gastrointestinal: Negative for abdominal distention, abdominal pain, diarrhea, nausea and vomiting.   Endocrine: Negative for cold intolerance and heat intolerance.   Genitourinary: Negative for difficulty urinating.   Musculoskeletal: Negative for neck pain and neck stiffness.   Skin: Negative for rash.   Neurological: Negative for dizziness, weakness and " headaches.   Hematological: Negative for adenopathy.       Objective:      Physical Exam   Constitutional: He is oriented to person, place, and time. He is cooperative. He has a sickly appearance. No distress.   Neck:       Cardiovascular: Normal rate.    Pulmonary/Chest: Effort normal. No respiratory distress.   Musculoskeletal:        Right foot: There is swelling.        Left foot: There is tenderness and swelling.        Feet:    Neurological: He is alert and oriented to person, place, and time.   Vitals reviewed.      Assessment:       1. Squamous cell carcinoma of larynx        Plan:       Problem List Items Addressed This Visit        Oncology    Squamous cell carcinoma of larynx - Primary     Luís Esteban is scheduled for TL with ALT free flap with Dr. Deleon and Dr. Roes on 9/22/17. Dr. Deleon was present during this visit.    We had a long discussion regarding surgery and what to expect while in the hospital. Laryngectomy bootcamp education materials and binder provided to patient. Time was allowed for questions and they were answered to patient's and family's satisfaction. Impact Peptide 5 days pre op recommended.    We will send pt to the ER today for bilateral lower leg edema,redness, and pain.           Other Visit Diagnoses    None.

## 2017-09-14 NOTE — PROGRESS NOTES
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 Luís Esteban, age 47, was referred by Dr. Mk Deleon, head and neck surgical oncologist, for pre-operative counseling prior to anticipated total laryngectomy with BND and free flap reconstruction scheduled 9/22/17 with Deepak Deleon and [place].  He was accompanied by his sister Vicki and adoptive aunt Suraj.      MEDICAL HISTORY:  Past Medical History:   Diagnosis Date    Insulin dependent diabetes mellitus     Kidney failure     Laryngeal cancer     Tracheostomy dependence      DM reported as uncontrolled by other providers he has recently seen.    SURGICAL HISTORY:  Past Surgical History:   Procedure Laterality Date    DIRECT LARYNGOBRONCHOSCOPY  08/31/2017       FAMILY HISTORY:  No family history on file.    SOCIAL HISTORY and SUPPORT:  Mr. Esteban lives with family in [place], [place].   He is on disability due to kidney failure.  His sister reported that their father is a forman and Mr. Esteban works with him "when he is willing."  Primarily, he spends his time playing video games and watching TV.   There is a large extended network of family and friends who support Mr. Esteban, including his brother Harvey MICHEL (who has independently researched trach care and other aspects of Mr. Statons care to help himself provided needed care to Mr. Esteban), 4 sisters (Vicki, Lisy, Edwina, and Merry), two maternal aunts (Clari and Cooper), adoptive aunt Suraj, and Mr. Esteban's mother.  Vicki advised not to communicate with Mrs. Esteban re: information about Mr. Avila care b/c she "doesn't give good information."  For instance, she was apparently confused about information she received about today's appointments and passed along erroneous information causing " them to miss one of their appointments today.    Tobacco:  Former smoker per EMR.  ETOH:  No.    BEHAVIOR:  Mr. Esteban presented with subdued social interaction, not inappropriate for the situation and setting.  His trach was in place, but had extravasation of greenish mucous.  Dr. Deleon had reported that he was having some increased work of breathing with the PMSV in place, so had removed it, giving relief; but Mr. Esteban had it in place for most of the visit.  He answered questions readily, but did not ask questions.    HEARING:  Mr. Esteban reported that his hearing was good.    EVALUATION:  Oral Peripheral:  Informal assessment revealed structures and functioning within normal limits for speech and swallowing purposes.     Speech intelligibility was judged as good.    Voice was moderately dysphonic.    Swallowing was not directly assessed.  Mr. Esteban reported that he is eating, but has a g-tube.  Per MBSS 9/1/17 while inpatient, he is to use a dental soft consistency diet with nectar-thick liquids.    Reading/Writing:  Mr. Esteban reported that these were not easy for him.  He denied that he uses the computer.    COUNSELING:  Mr. Esteban was asked to tell his understanding of what the proposed surgery would entail. He was able to tell that the cancer would be cut out.    Physical and/or lifestyle changes that are typical results of total laryngectomy were discussed with Mr. Esteban and his family. These included   1. Changes in anatomy and the permanent removal of the larynx and separation of the respiratory and digestive tracts.   2. Changes in the mechanisms of taste, smell, sniffing, sneezing, nose blowing, blowing, and activities heretofore requiring effortful closure of the vocal cords (e.g., effortful physical work, defecation, coughing).   3. Precautions required for bathing/showering and shampooing, shaving, and activities in or near water.   4. Changes in airway filtration and  "effects of dry/cold air, humidity, and use of HMEs (with LaryTube or base plate).   5. Alaryngeal communication methods, including use of artificial larynx immediately post-op with an oral adapter, optional neck placement of artificial larynx once surgically cleared, traditional esophageal speech, and tracheoesophageal puncture with prosthesis (TEP) and esophageal voicing powered by lung air.   6. Rescue breathing for neck-breathers (brochure for caregivers and DVD to be shared by family with local first responders were provided).   7. General hygiene needs for stoma (and TEP when applicable).   8. Changes in other activities.   9. Possible post-op need for other therapies such as PT and/or OT.     Luís Esteban was provided with a packet of materials (incorporated into his binder with materials from other disciplines that will follow him through "boot camp") and these were reviewed. These included   1. Contact information for the International Association of Laryngectomees as well as a recent copy of the IAL Newsletter.   2. Rescue Breathing for Laryngecomtees brochure and DVD of the same name to be shared with the patient's local first responders, as well as cards/decals for wallet and car identifying Luís Esteban as a neck-breather.   3. Medic-Alert contact information.   4. Educational literature: Why Didn't They Tell Me?: Questions and Answers for the Laryngectomee, Life as a Laryngectomee, and First Steps.   5. Do's and Don'ts for Family and Friends handout   6. Illustrations depicting pre-surgical, post-TL, and post-TEP anatomy.   7. Other resources, including websites, brochures, catalogues, and relay service information.     The general course of therapeutic intervention was discussed including that Mr. Esteban would see the acute-care SLPs while in hospital, likely receive home health upon discharge, and would be to able to come back to this clinic as an outpatient as needed once home health " services were completed.     Luís Esteban and his family verbalized understanding of the above topics and asked appropriate questions. The clinician provided her contact information for any future questions or need for follow-up.       IMPRESSIONS:  This 47 y.o. man appears to present with  1.  Likely somewhat limited understanding and expectations related to the proposed surgery scheduled for 9/22.  Family appears to have better understanding and to be equipped to read provided materials independently.  2.  Good family/friend support for this procedure and post-surgical needs.  3.  Not an appropriate candidate for primary placement of TEP based on other medical history and reported noncompliance with DM management as well as distance from center.  Recommend emphasis on AL training initially with possible esophageal speech training.  4.  History of   Past Medical History:   Diagnosis Date    Insulin dependent diabetes mellitus        G codes:  Voice  Current status: FCM:  LEVEL 3: Voice is functional for communication, but is consistently distracting and interferes with communication by drawing attention to itself. Participation in vocational, avocational, and social activities is limited most of the time.   - CL   Projected status:  FCM:  LEVEL 1: The individual is unable to use voice to communicate. Alternative means for communicating are used all of the time. The individual cannot participate invocational, avocational, and social activities requiring voice.   - CN   Discharge status:  FCM:  LEVEL 3: Voice is functional for communication, but is consistently distracting and interferes with communication by drawing attention to itself. Participation in vocational, avocational, and social activities is limited most of the time.   - CL        RECOMMENDATIONS:  It is felt that Luís Esteban would benefit from  1.  Further review with family/friends of the materials provided and follow-up  "with clinician as needed for clarification or additional information.  2.  Visitation with a laryngectomee "ambassador."  He declined this, saying he knows 9 persons who have had this surgery.  3.  Continuation of plans for surgery as proposed by Dr. Deleon.  Do not recommend primary TEP placement.  4.  Use of HME cassettes as soon as possible post-op to facilitate optimal pulmonary rehab.  5.  Inpatient post-op ST for AL training and stoma care and other education as soon as Dr. Deleon feels patient is ready.  6.  Consider likely need for Home Health ST upon discharge for continued AL training, stoma care training, and any other related education/training.  7.  Return to outpatient therapy as directed by Dr. Deleon.  I will be available to the patient/family by phone or as an adjunct to his surgical post-op visits while he is in home health care.      Long-term goals:  Luís Esteban will  1.  Be independent in stoma and/or TEP care.  2.  Be able to use an alaryngeal mode of communication effectively and with % intelligibility.  3.  Resume a regular diet.    Short-term objectives:  Luís Esteban will  1.  Further review with family/friends of the materials provided and follow-up with clinician as needed for clarification or additional information.  2.  Visitation with a laryngectomee "ambassador."  He declined this, saying he knows 9 persons who have had this surgery.  3.  Continuation of plans for surgery as proposed by Dr. Deleon.  4.  Use of HME cassettes as soon as possible post-op to facilitate optimal pulmonary rehab.  5.  Inpatient post-op ST for AL training and stoma care and other education as soon as Dr. Deleon feels patient is ready.  6.  Consider likely need for Home Health ST upon discharge for continued AL training, stoma care training, and any other related education/training.  7.  Return to outpatient therapy as directed by Dr. Deleon.  I will be available to the patient/family by " phone or as an adjunct to his surgical post-op visits while he is in home health care.    Physician's Comments:   I agree with the above recommendations    Physician's Name:  Mk Deleon M.D.

## 2017-09-14 NOTE — ASSESSMENT & PLAN NOTE
Luís Esteban is scheduled for TL with ALT free flap with Dr. Deleon and Dr. Rose on 9/22/17. Dr. Deleon was present during this visit.    We had a long discussion regarding surgery and what to expect while in the hospital. Laryngectomy bootcamp education materials and binder provided to patient. Time was allowed for questions and they were answered to patient's and family's satisfaction. Impact Peptide 5 days pre op recommended.    We will send pt to the ER today for bilateral lower leg edema,redness, and pain.

## 2017-09-15 NOTE — PATIENT INSTRUCTIONS
"RECOMMENDATIONS:  It is felt that Luís Esteban would benefit from  1.  Further review with family/friends of the materials provided and follow-up with clinician as needed for clarification or additional information.  2.  Visitation with a laryngectomee "ambassador."  He declined this, saying he knows 9 persons who have had this surgery.  3.  Continuation of plans for surgery as proposed by Dr. Deleon.  4.  Use of HME cassettes as soon as possible post-op to facilitate optimal pulmonary rehab.  5.  Inpatient post-op ST for AL training and stoma care and other education as soon as Dr. Deleon feels patient is ready.  6.  Consider likely need for Home Health ST upon discharge for continued AL training, stoma care training, and any other related education/training.  7.  Return to outpatient therapy as directed by Dr. Deleon.  I will be available to the patient/family by phone or as an adjunct to his surgical post-op visits while he is in home health care.      "

## 2017-09-21 ENCOUNTER — TELEPHONE (OUTPATIENT)
Dept: OTOLARYNGOLOGY | Facility: CLINIC | Age: 47
End: 2017-09-21

## 2017-09-21 NOTE — PRE-PROCEDURE INSTRUCTIONS
Phone call to pt's sister, Lisy, today with pre-op instructions given including NPO after MN, medications to take/hold the morning of surgery, arrival procedure and location, shower with antibacterial soap. Sisters questions answered and concerns addressed; verbalized understanding.

## 2017-09-22 ENCOUNTER — ANESTHESIA EVENT (OUTPATIENT)
Dept: SURGERY | Facility: HOSPITAL | Age: 47
End: 2017-09-22
Payer: MEDICAID

## 2017-09-22 ENCOUNTER — HOSPITAL ENCOUNTER (INPATIENT)
Facility: HOSPITAL | Age: 47
LOS: 10 days | End: 2017-10-02
Attending: OTOLARYNGOLOGY | Admitting: OTOLARYNGOLOGY
Payer: MEDICAID

## 2017-09-22 ENCOUNTER — ANESTHESIA (OUTPATIENT)
Dept: SURGERY | Facility: HOSPITAL | Age: 47
End: 2017-09-22
Payer: MEDICAID

## 2017-09-22 DIAGNOSIS — Z78.9 ON ENTERAL NUTRITION: ICD-10-CM

## 2017-09-22 DIAGNOSIS — E83.51 HYPOCALCEMIA: ICD-10-CM

## 2017-09-22 DIAGNOSIS — E89.0 HYPOTHYROIDISM, POSTSURGICAL: ICD-10-CM

## 2017-09-22 DIAGNOSIS — E11.65 UNCONTROLLED TYPE 2 DIABETES MELLITUS WITH HYPERGLYCEMIA, WITH LONG-TERM CURRENT USE OF INSULIN: ICD-10-CM

## 2017-09-22 DIAGNOSIS — R13.12 DYSPHAGIA, OROPHARYNGEAL: ICD-10-CM

## 2017-09-22 DIAGNOSIS — C32.9 LARYNGEAL CANCER: ICD-10-CM

## 2017-09-22 DIAGNOSIS — Z79.4 UNCONTROLLED TYPE 2 DIABETES MELLITUS WITH HYPERGLYCEMIA, WITH LONG-TERM CURRENT USE OF INSULIN: ICD-10-CM

## 2017-09-22 DIAGNOSIS — E10.649 HYPOGLYCEMIA DUE TO TYPE 1 DIABETES MELLITUS: ICD-10-CM

## 2017-09-22 DIAGNOSIS — E10.65 TYPE 1 DIABETES MELLITUS WITH HYPERGLYCEMIA: Chronic | ICD-10-CM

## 2017-09-22 DIAGNOSIS — E89.2 POST-SURGICAL HYPOPARATHYROIDISM: ICD-10-CM

## 2017-09-22 DIAGNOSIS — Z90.02 S/P LARYNGECTOMY: Primary | ICD-10-CM

## 2017-09-22 LAB
ALBUMIN SERPL BCP-MCNC: 2.2 G/DL
ALLENS TEST: ABNORMAL
ALP SERPL-CCNC: 60 U/L
ALT SERPL W/O P-5'-P-CCNC: 8 U/L
ANION GAP SERPL CALC-SCNC: 10 MMOL/L
AST SERPL-CCNC: 21 U/L
BASOPHILS # BLD AUTO: 0.01 K/UL
BASOPHILS NFR BLD: 0.1 %
BILIRUB SERPL-MCNC: 0.7 MG/DL
BUN SERPL-MCNC: 23 MG/DL
CALCIUM SERPL-MCNC: 7.9 MG/DL
CHLORIDE SERPL-SCNC: 102 MMOL/L
CO2 SERPL-SCNC: 26 MMOL/L
CREAT SERPL-MCNC: 0.7 MG/DL
DELSYS: ABNORMAL
DIFFERENTIAL METHOD: ABNORMAL
EOSINOPHIL # BLD AUTO: 0 K/UL
EOSINOPHIL NFR BLD: 0.1 %
ERYTHROCYTE [DISTWIDTH] IN BLOOD BY AUTOMATED COUNT: 17 %
ERYTHROCYTE [SEDIMENTATION RATE] IN BLOOD BY WESTERGREN METHOD: 16 MM/H
EST. GFR  (AFRICAN AMERICAN): >60 ML/MIN/1.73 M^2
EST. GFR  (NON AFRICAN AMERICAN): >60 ML/MIN/1.73 M^2
FIO2: 40
FLOW: 60
GLUCOSE SERPL-MCNC: 130 MG/DL (ref 70–110)
GLUCOSE SERPL-MCNC: 142 MG/DL
GLUCOSE SERPL-MCNC: 163 MG/DL (ref 70–110)
GLUCOSE SERPL-MCNC: 177 MG/DL (ref 70–110)
GLUCOSE SERPL-MCNC: 215 MG/DL (ref 70–110)
GLUCOSE SERPL-MCNC: 223 MG/DL (ref 70–110)
GLUCOSE SERPL-MCNC: 234 MG/DL (ref 70–110)
HCO3 UR-SCNC: 24.9 MMOL/L (ref 24–28)
HCO3 UR-SCNC: 25.8 MMOL/L (ref 24–28)
HCO3 UR-SCNC: 26.7 MMOL/L (ref 24–28)
HCO3 UR-SCNC: 28.8 MMOL/L (ref 24–28)
HCO3 UR-SCNC: 28.9 MMOL/L (ref 24–28)
HCO3 UR-SCNC: 29.1 MMOL/L (ref 24–28)
HCO3 UR-SCNC: 31.7 MMOL/L (ref 24–28)
HCT VFR BLD AUTO: 24.3 %
HCT VFR BLD CALC: 22 %PCV (ref 36–54)
HCT VFR BLD CALC: 23 %PCV (ref 36–54)
HCT VFR BLD CALC: 24 %PCV (ref 36–54)
HCT VFR BLD CALC: 26 %PCV (ref 36–54)
HGB BLD-MCNC: 8.4 G/DL
LACTATE SERPL-SCNC: 0.7 MMOL/L
LYMPHOCYTES # BLD AUTO: 1.3 K/UL
LYMPHOCYTES NFR BLD: 8.3 %
MCH RBC QN AUTO: 30.8 PG
MCHC RBC AUTO-ENTMCNC: 34.6 G/DL
MCV RBC AUTO: 89 FL
MODE: ABNORMAL
MONOCYTES # BLD AUTO: 0.7 K/UL
MONOCYTES NFR BLD: 4.7 %
NEUTROPHILS # BLD AUTO: 13.4 K/UL
NEUTROPHILS NFR BLD: 86.3 %
PCO2 BLDA: 30.4 MMHG (ref 35–45)
PCO2 BLDA: 31.8 MMHG (ref 35–45)
PCO2 BLDA: 34.1 MMHG (ref 35–45)
PCO2 BLDA: 34.9 MMHG (ref 35–45)
PCO2 BLDA: 36.6 MMHG (ref 35–45)
PCO2 BLDA: 41.3 MMHG (ref 35–45)
PCO2 BLDA: 43 MMHG (ref 35–45)
PH SMN: 7.37 [PH] (ref 7.35–7.45)
PH SMN: 7.49 [PH] (ref 7.35–7.45)
PH SMN: 7.51 [PH] (ref 7.35–7.45)
PH SMN: 7.52 [PH] (ref 7.35–7.45)
PH SMN: 7.53 [PH] (ref 7.35–7.45)
PH SMN: 7.54 [PH] (ref 7.35–7.45)
PH SMN: 7.54 [PH] (ref 7.35–7.45)
PLATELET # BLD AUTO: 444 K/UL
PMV BLD AUTO: 8.3 FL
PO2 BLDA: 100 MMHG (ref 80–100)
PO2 BLDA: 157 MMHG (ref 80–100)
PO2 BLDA: 181 MMHG (ref 80–100)
PO2 BLDA: 181 MMHG (ref 80–100)
PO2 BLDA: 313 MMHG (ref 80–100)
PO2 BLDA: 411 MMHG (ref 80–100)
PO2 BLDA: 95 MMHG (ref 80–100)
POC BE: 0 MMOL/L
POC BE: 3 MMOL/L
POC BE: 4 MMOL/L
POC BE: 6 MMOL/L
POC BE: 6 MMOL/L
POC BE: 7 MMOL/L
POC BE: 8 MMOL/L
POC IONIZED CALCIUM: 1.06 MMOL/L (ref 1.06–1.42)
POC IONIZED CALCIUM: 1.06 MMOL/L (ref 1.06–1.42)
POC IONIZED CALCIUM: 1.07 MMOL/L (ref 1.06–1.42)
POC IONIZED CALCIUM: 1.07 MMOL/L (ref 1.06–1.42)
POC IONIZED CALCIUM: 1.08 MMOL/L (ref 1.06–1.42)
POC IONIZED CALCIUM: 1.13 MMOL/L (ref 1.06–1.42)
POC SATURATED O2: 100 % (ref 95–100)
POC SATURATED O2: 98 % (ref 95–100)
POC SATURATED O2: 99 % (ref 95–100)
POC TCO2: 26 MMOL/L (ref 23–27)
POC TCO2: 27 MMOL/L (ref 23–27)
POC TCO2: 28 MMOL/L (ref 23–27)
POC TCO2: 30 MMOL/L (ref 23–27)
POC TCO2: 33 MMOL/L (ref 23–27)
POCT GLUCOSE: 342 MG/DL (ref 70–110)
POCT GLUCOSE: 440 MG/DL (ref 70–110)
POTASSIUM BLD-SCNC: 3.5 MMOL/L (ref 3.5–5.1)
POTASSIUM BLD-SCNC: 3.7 MMOL/L (ref 3.5–5.1)
POTASSIUM BLD-SCNC: 3.8 MMOL/L (ref 3.5–5.1)
POTASSIUM BLD-SCNC: 4 MMOL/L (ref 3.5–5.1)
POTASSIUM BLD-SCNC: 4.4 MMOL/L (ref 3.5–5.1)
POTASSIUM BLD-SCNC: 4.4 MMOL/L (ref 3.5–5.1)
POTASSIUM SERPL-SCNC: 4.2 MMOL/L
PROT SERPL-MCNC: 5.7 G/DL
PTH-INTACT SERPL-MCNC: <5 PG/ML
RBC # BLD AUTO: 2.73 M/UL
SAMPLE: ABNORMAL
SITE: ABNORMAL
SODIUM BLD-SCNC: 135 MMOL/L (ref 136–145)
SODIUM BLD-SCNC: 136 MMOL/L (ref 136–145)
SODIUM BLD-SCNC: 137 MMOL/L (ref 136–145)
SODIUM BLD-SCNC: 137 MMOL/L (ref 136–145)
SODIUM SERPL-SCNC: 138 MMOL/L
SP02: 100
WBC # BLD AUTO: 15.46 K/UL

## 2017-09-22 PROCEDURE — 27201423 OPTIME MED/SURG SUP & DEVICES STERILE SUPPLY: Performed by: OTOLARYNGOLOGY

## 2017-09-22 PROCEDURE — 99900017 HC EXTUBATION W/PARAMETERS (STAT)

## 2017-09-22 PROCEDURE — 63600175 PHARM REV CODE 636 W HCPCS: Performed by: ANESTHESIOLOGY

## 2017-09-22 PROCEDURE — 20000000 HC ICU ROOM

## 2017-09-22 PROCEDURE — C9399 UNCLASSIFIED DRUGS OR BIOLOG: HCPCS | Performed by: NURSE ANESTHETIST, CERTIFIED REGISTERED

## 2017-09-22 PROCEDURE — 37000008 HC ANESTHESIA 1ST 15 MINUTES: Performed by: OTOLARYNGOLOGY

## 2017-09-22 PROCEDURE — 63600175 PHARM REV CODE 636 W HCPCS: Performed by: NURSE ANESTHETIST, CERTIFIED REGISTERED

## 2017-09-22 PROCEDURE — 63600175 PHARM REV CODE 636 W HCPCS: Performed by: OTOLARYNGOLOGY

## 2017-09-22 PROCEDURE — 27201037 HC PRESSURE MONITORING SET UP

## 2017-09-22 PROCEDURE — 0GTK0ZZ RESECTION OF THYROID GLAND, OPEN APPROACH: ICD-10-PCS | Performed by: OTOLARYNGOLOGY

## 2017-09-22 PROCEDURE — C1729 CATH, DRAINAGE: HCPCS | Performed by: OTOLARYNGOLOGY

## 2017-09-22 PROCEDURE — 36000708 HC OR TIME LEV III 1ST 15 MIN: Performed by: OTOLARYNGOLOGY

## 2017-09-22 PROCEDURE — 83970 ASSAY OF PARATHORMONE: CPT

## 2017-09-22 PROCEDURE — 25000003 PHARM REV CODE 250: Performed by: OTOLARYNGOLOGY

## 2017-09-22 PROCEDURE — 88305 TISSUE EXAM BY PATHOLOGIST: CPT

## 2017-09-22 PROCEDURE — 82962 GLUCOSE BLOOD TEST: CPT | Performed by: OTOLARYNGOLOGY

## 2017-09-22 PROCEDURE — 25000003 PHARM REV CODE 250: Performed by: NURSE ANESTHETIST, CERTIFIED REGISTERED

## 2017-09-22 PROCEDURE — 38724 REMOVAL OF LYMPH NODES NECK: CPT | Mod: RT,,, | Performed by: OTOLARYNGOLOGY

## 2017-09-22 PROCEDURE — 25000003 PHARM REV CODE 250: Performed by: ANESTHESIOLOGY

## 2017-09-22 PROCEDURE — 36620 INSERTION CATHETER ARTERY: CPT | Mod: 59,,, | Performed by: ANESTHESIOLOGY

## 2017-09-22 PROCEDURE — 80053 COMPREHEN METABOLIC PANEL: CPT

## 2017-09-22 PROCEDURE — 60240 REMOVAL OF THYROID: CPT | Mod: 51,,, | Performed by: OTOLARYNGOLOGY

## 2017-09-22 PROCEDURE — 85025 COMPLETE CBC W/AUTO DIFF WBC: CPT

## 2017-09-22 PROCEDURE — 31360 REMOVAL OF LARYNX: CPT | Mod: ,,, | Performed by: OTOLARYNGOLOGY

## 2017-09-22 PROCEDURE — 15734 MUSCLE-SKIN GRAFT TRUNK: CPT | Mod: 51,,, | Performed by: OTOLARYNGOLOGY

## 2017-09-22 PROCEDURE — 27000221 HC OXYGEN, UP TO 24 HOURS

## 2017-09-22 PROCEDURE — 07T10ZZ RESECTION OF RIGHT NECK LYMPHATIC, OPEN APPROACH: ICD-10-PCS | Performed by: OTOLARYNGOLOGY

## 2017-09-22 PROCEDURE — 83605 ASSAY OF LACTIC ACID: CPT

## 2017-09-22 PROCEDURE — 88307 TISSUE EXAM BY PATHOLOGIST: CPT | Mod: 26,,,

## 2017-09-22 PROCEDURE — 0CJS8ZZ INSPECTION OF LARYNX, VIA NATURAL OR ARTIFICIAL OPENING ENDOSCOPIC: ICD-10-PCS | Performed by: OTOLARYNGOLOGY

## 2017-09-22 PROCEDURE — D9220A PRA ANESTHESIA: Mod: ANES,,, | Performed by: ANESTHESIOLOGY

## 2017-09-22 PROCEDURE — C1769 GUIDE WIRE: HCPCS | Performed by: OTOLARYNGOLOGY

## 2017-09-22 PROCEDURE — 0KXH0ZZ TRANSFER RIGHT THORAX MUSCLE, OPEN APPROACH: ICD-10-PCS | Performed by: OTOLARYNGOLOGY

## 2017-09-22 PROCEDURE — 37000009 HC ANESTHESIA EA ADD 15 MINS: Performed by: OTOLARYNGOLOGY

## 2017-09-22 PROCEDURE — 07T20ZZ RESECTION OF LEFT NECK LYMPHATIC, OPEN APPROACH: ICD-10-PCS | Performed by: OTOLARYNGOLOGY

## 2017-09-22 PROCEDURE — 88331 PATH CONSLTJ SURG 1 BLK 1SPC: CPT | Mod: 26,,,

## 2017-09-22 PROCEDURE — 36000709 HC OR TIME LEV III EA ADD 15 MIN: Performed by: OTOLARYNGOLOGY

## 2017-09-22 PROCEDURE — 94761 N-INVAS EAR/PLS OXIMETRY MLT: CPT

## 2017-09-22 PROCEDURE — 94002 VENT MGMT INPAT INIT DAY: CPT

## 2017-09-22 PROCEDURE — 88305 TISSUE EXAM BY PATHOLOGIST: CPT | Mod: 26,,,

## 2017-09-22 PROCEDURE — 88331 PATH CONSLTJ SURG 1 BLK 1SPC: CPT

## 2017-09-22 PROCEDURE — 88309 TISSUE EXAM BY PATHOLOGIST: CPT | Mod: 26,,,

## 2017-09-22 PROCEDURE — D9220A PRA ANESTHESIA: Mod: CRNA,,, | Performed by: NURSE ANESTHETIST, CERTIFIED REGISTERED

## 2017-09-22 PROCEDURE — 0CTS0ZZ RESECTION OF LARYNX, OPEN APPROACH: ICD-10-PCS | Performed by: OTOLARYNGOLOGY

## 2017-09-22 PROCEDURE — 63600175 PHARM REV CODE 636 W HCPCS

## 2017-09-22 PROCEDURE — 94150 VITAL CAPACITY TEST: CPT

## 2017-09-22 PROCEDURE — 38724 REMOVAL OF LYMPH NODES NECK: CPT | Mod: 51,LT,, | Performed by: OTOLARYNGOLOGY

## 2017-09-22 RX ORDER — ROCURONIUM BROMIDE 10 MG/ML
INJECTION, SOLUTION INTRAVENOUS
Status: DISCONTINUED | OUTPATIENT
Start: 2017-09-22 | End: 2017-09-22

## 2017-09-22 RX ORDER — CALCIUM GLUCONATE 98 MG/ML
INJECTION, SOLUTION INTRAVENOUS
Status: DISCONTINUED | OUTPATIENT
Start: 2017-09-22 | End: 2017-09-22

## 2017-09-22 RX ORDER — SODIUM CHLORIDE 0.9 % (FLUSH) 0.9 %
3 SYRINGE (ML) INJECTION EVERY 8 HOURS
Status: DISCONTINUED | OUTPATIENT
Start: 2017-09-22 | End: 2017-09-22 | Stop reason: HOSPADM

## 2017-09-22 RX ORDER — SODIUM CHLORIDE 9 MG/ML
INJECTION, SOLUTION INTRAVENOUS CONTINUOUS
Status: DISCONTINUED | OUTPATIENT
Start: 2017-09-22 | End: 2017-09-22

## 2017-09-22 RX ORDER — HEPARIN SODIUM 1000 [USP'U]/ML
INJECTION, SOLUTION INTRAVENOUS; SUBCUTANEOUS
Status: DISCONTINUED | OUTPATIENT
Start: 2017-09-22 | End: 2017-09-22 | Stop reason: HOSPADM

## 2017-09-22 RX ORDER — CEFAZOLIN SODIUM 2 G/50ML
2 SOLUTION INTRAVENOUS
Status: DISCONTINUED | OUTPATIENT
Start: 2017-09-22 | End: 2017-09-22

## 2017-09-22 RX ORDER — VASOPRESSIN 20 [USP'U]/ML
INJECTION, SOLUTION INTRAMUSCULAR; SUBCUTANEOUS
Status: DISCONTINUED | OUTPATIENT
Start: 2017-09-22 | End: 2017-09-22

## 2017-09-22 RX ORDER — LIDOCAINE HYDROCHLORIDE AND EPINEPHRINE 10; 10 MG/ML; UG/ML
INJECTION, SOLUTION INFILTRATION; PERINEURAL
Status: DISCONTINUED | OUTPATIENT
Start: 2017-09-22 | End: 2017-09-22 | Stop reason: HOSPADM

## 2017-09-22 RX ORDER — HYDROMORPHONE HYDROCHLORIDE 1 MG/ML
0.2 INJECTION, SOLUTION INTRAMUSCULAR; INTRAVENOUS; SUBCUTANEOUS EVERY 5 MIN PRN
Status: DISCONTINUED | OUTPATIENT
Start: 2017-09-22 | End: 2017-09-22

## 2017-09-22 RX ORDER — FENTANYL CITRATE 50 UG/ML
INJECTION, SOLUTION INTRAMUSCULAR; INTRAVENOUS
Status: DISCONTINUED | OUTPATIENT
Start: 2017-09-22 | End: 2017-09-22

## 2017-09-22 RX ORDER — INSULIN ASPART 100 [IU]/ML
1-10 INJECTION, SOLUTION INTRAVENOUS; SUBCUTANEOUS EVERY 6 HOURS PRN
Status: DISCONTINUED | OUTPATIENT
Start: 2017-09-22 | End: 2017-09-24

## 2017-09-22 RX ORDER — ONDANSETRON 2 MG/ML
4 INJECTION INTRAMUSCULAR; INTRAVENOUS EVERY 12 HOURS PRN
Status: DISCONTINUED | OUTPATIENT
Start: 2017-09-22 | End: 2017-10-02 | Stop reason: HOSPADM

## 2017-09-22 RX ORDER — LEVOTHYROXINE SODIUM ANHYDROUS 100 UG/5ML
55 INJECTION, POWDER, LYOPHILIZED, FOR SOLUTION INTRAVENOUS DAILY
Status: DISCONTINUED | OUTPATIENT
Start: 2017-09-23 | End: 2017-09-25

## 2017-09-22 RX ORDER — PANTOPRAZOLE SODIUM 40 MG/1
40 FOR SUSPENSION ORAL DAILY
Status: DISCONTINUED | OUTPATIENT
Start: 2017-09-25 | End: 2017-09-26

## 2017-09-22 RX ORDER — PANTOPRAZOLE SODIUM 40 MG/10ML
40 INJECTION, POWDER, LYOPHILIZED, FOR SOLUTION INTRAVENOUS DAILY
Status: COMPLETED | OUTPATIENT
Start: 2017-09-23 | End: 2017-09-25

## 2017-09-22 RX ORDER — LIDOCAINE HYDROCHLORIDE 40 MG/ML
INJECTION, SOLUTION RETROBULBAR
Status: DISCONTINUED | OUTPATIENT
Start: 2017-09-22 | End: 2017-09-22 | Stop reason: HOSPADM

## 2017-09-22 RX ORDER — DIPHENHYDRAMINE HYDROCHLORIDE 50 MG/ML
25 INJECTION INTRAMUSCULAR; INTRAVENOUS EVERY 6 HOURS PRN
Status: DISCONTINUED | OUTPATIENT
Start: 2017-09-22 | End: 2017-10-02 | Stop reason: HOSPADM

## 2017-09-22 RX ORDER — METHYLENE BLUE 5 MG/ML
INJECTION INTRAVENOUS
Status: DISCONTINUED | OUTPATIENT
Start: 2017-09-22 | End: 2017-09-22 | Stop reason: HOSPADM

## 2017-09-22 RX ORDER — SODIUM CHLORIDE 9 MG/ML
INJECTION, SOLUTION INTRAVENOUS CONTINUOUS PRN
Status: DISCONTINUED | OUTPATIENT
Start: 2017-09-22 | End: 2017-09-22

## 2017-09-22 RX ORDER — HYDRALAZINE HYDROCHLORIDE 20 MG/ML
10 INJECTION INTRAMUSCULAR; INTRAVENOUS EVERY 4 HOURS PRN
Status: DISCONTINUED | OUTPATIENT
Start: 2017-09-22 | End: 2017-10-02 | Stop reason: HOSPADM

## 2017-09-22 RX ORDER — HYDROMORPHONE HYDROCHLORIDE 1 MG/ML
1 INJECTION, SOLUTION INTRAMUSCULAR; INTRAVENOUS; SUBCUTANEOUS EVERY 4 HOURS PRN
Status: DISCONTINUED | OUTPATIENT
Start: 2017-09-22 | End: 2017-10-02 | Stop reason: HOSPADM

## 2017-09-22 RX ORDER — INSULIN ASPART 100 [IU]/ML
1-10 INJECTION, SOLUTION INTRAVENOUS; SUBCUTANEOUS EVERY 6 HOURS PRN
Status: DISCONTINUED | OUTPATIENT
Start: 2017-09-22 | End: 2017-09-23

## 2017-09-22 RX ORDER — PHENYLEPHRINE HYDROCHLORIDE 10 MG/ML
INJECTION INTRAVENOUS
Status: DISCONTINUED | OUTPATIENT
Start: 2017-09-22 | End: 2017-09-22

## 2017-09-22 RX ORDER — LIDOCAINE HYDROCHLORIDE 10 MG/ML
1 INJECTION, SOLUTION EPIDURAL; INFILTRATION; INTRACAUDAL; PERINEURAL ONCE
Status: DISCONTINUED | OUTPATIENT
Start: 2017-09-22 | End: 2017-09-22 | Stop reason: HOSPADM

## 2017-09-22 RX ORDER — HYDROMORPHONE HYDROCHLORIDE 1 MG/ML
0.5 INJECTION, SOLUTION INTRAMUSCULAR; INTRAVENOUS; SUBCUTANEOUS EVERY 4 HOURS PRN
Status: DISCONTINUED | OUTPATIENT
Start: 2017-09-22 | End: 2017-10-02 | Stop reason: HOSPADM

## 2017-09-22 RX ORDER — PROPOFOL 10 MG/ML
VIAL (ML) INTRAVENOUS
Status: DISCONTINUED | OUTPATIENT
Start: 2017-09-22 | End: 2017-09-22

## 2017-09-22 RX ORDER — MIDAZOLAM HYDROCHLORIDE 1 MG/ML
INJECTION, SOLUTION INTRAMUSCULAR; INTRAVENOUS
Status: DISCONTINUED | OUTPATIENT
Start: 2017-09-22 | End: 2017-09-22

## 2017-09-22 RX ORDER — LIDOCAINE HYDROCHLORIDE 10 MG/ML
1 INJECTION, SOLUTION EPIDURAL; INFILTRATION; INTRACAUDAL; PERINEURAL ONCE
Status: COMPLETED | OUTPATIENT
Start: 2017-09-22 | End: 2017-09-22

## 2017-09-22 RX ORDER — DEXAMETHASONE SODIUM PHOSPHATE 4 MG/ML
8 INJECTION, SOLUTION INTRA-ARTICULAR; INTRALESIONAL; INTRAMUSCULAR; INTRAVENOUS; SOFT TISSUE
Status: DISCONTINUED | OUTPATIENT
Start: 2017-09-22 | End: 2017-09-22

## 2017-09-22 RX ORDER — METHYLENE BLUE 10 MG/ML
INJECTION INTRAVENOUS
Status: DISCONTINUED | OUTPATIENT
Start: 2017-09-22 | End: 2017-09-22 | Stop reason: HOSPADM

## 2017-09-22 RX ORDER — GLUCAGON 1 MG
1 KIT INJECTION
Status: DISCONTINUED | OUTPATIENT
Start: 2017-09-22 | End: 2017-09-25

## 2017-09-22 RX ADMIN — LIDOCAINE HYDROCHLORIDE 1 MG: 10 INJECTION, SOLUTION EPIDURAL; INFILTRATION; INTRACAUDAL; PERINEURAL at 08:09

## 2017-09-22 RX ADMIN — HYDROMORPHONE HYDROCHLORIDE 0.5 MG: 1 INJECTION, SOLUTION INTRAMUSCULAR; INTRAVENOUS; SUBCUTANEOUS at 10:09

## 2017-09-22 RX ADMIN — INSULIN HUMAN 5 UNITS: 100 INJECTION, SOLUTION PARENTERAL at 09:09

## 2017-09-22 RX ADMIN — PHENYLEPHRINE HYDROCHLORIDE 100 MCG: 10 INJECTION INTRAVENOUS at 04:09

## 2017-09-22 RX ADMIN — CALCIUM GLUCONATE 500 MG: 94 INJECTION, SOLUTION INTRAVENOUS at 05:09

## 2017-09-22 RX ADMIN — FENTANYL CITRATE 50 MCG: 50 INJECTION, SOLUTION INTRAMUSCULAR; INTRAVENOUS at 03:09

## 2017-09-22 RX ADMIN — PHENYLEPHRINE HYDROCHLORIDE 100 MCG: 10 INJECTION INTRAVENOUS at 11:09

## 2017-09-22 RX ADMIN — ROCURONIUM BROMIDE 20 MG: 10 INJECTION, SOLUTION INTRAVENOUS at 02:09

## 2017-09-22 RX ADMIN — INSULIN HUMAN 5 UNITS: 100 INJECTION, SOLUTION PARENTERAL at 10:09

## 2017-09-22 RX ADMIN — SODIUM CHLORIDE 3 G: 9 INJECTION, SOLUTION INTRAVENOUS at 04:09

## 2017-09-22 RX ADMIN — PROPOFOL 100 MG: 10 INJECTION, EMULSION INTRAVENOUS at 11:09

## 2017-09-22 RX ADMIN — ROCURONIUM BROMIDE 10 MG: 10 INJECTION, SOLUTION INTRAVENOUS at 04:09

## 2017-09-22 RX ADMIN — SODIUM CHLORIDE 3 G: 9 INJECTION, SOLUTION INTRAVENOUS at 01:09

## 2017-09-22 RX ADMIN — ROCURONIUM BROMIDE 40 MG: 10 INJECTION, SOLUTION INTRAVENOUS at 11:09

## 2017-09-22 RX ADMIN — PHENYLEPHRINE HYDROCHLORIDE 100 MCG: 10 INJECTION INTRAVENOUS at 01:09

## 2017-09-22 RX ADMIN — ONDANSETRON 4 MG: 2 INJECTION INTRAMUSCULAR; INTRAVENOUS at 09:09

## 2017-09-22 RX ADMIN — FENTANYL CITRATE 100 MCG: 50 INJECTION, SOLUTION INTRAMUSCULAR; INTRAVENOUS at 04:09

## 2017-09-22 RX ADMIN — HYDROMORPHONE HYDROCHLORIDE 0.2 MG: 1 INJECTION, SOLUTION INTRAMUSCULAR; INTRAVENOUS; SUBCUTANEOUS at 07:09

## 2017-09-22 RX ADMIN — ROCURONIUM BROMIDE 10 MG: 10 INJECTION, SOLUTION INTRAVENOUS at 03:09

## 2017-09-22 RX ADMIN — ROCURONIUM BROMIDE 10 MG: 10 INJECTION, SOLUTION INTRAVENOUS at 12:09

## 2017-09-22 RX ADMIN — FENTANYL CITRATE 100 MCG: 50 INJECTION, SOLUTION INTRAMUSCULAR; INTRAVENOUS at 11:09

## 2017-09-22 RX ADMIN — SODIUM CHLORIDE, SODIUM GLUCONATE, SODIUM ACETATE, POTASSIUM CHLORIDE, MAGNESIUM CHLORIDE, SODIUM PHOSPHATE, DIBASIC, AND POTASSIUM PHOSPHATE: .53; .5; .37; .037; .03; .012; .00082 INJECTION, SOLUTION INTRAVENOUS at 02:09

## 2017-09-22 RX ADMIN — SODIUM CHLORIDE 3 G: 9 INJECTION, SOLUTION INTRAVENOUS at 11:09

## 2017-09-22 RX ADMIN — PHENYLEPHRINE HYDROCHLORIDE 200 MCG: 10 INJECTION INTRAVENOUS at 12:09

## 2017-09-22 RX ADMIN — PHENYLEPHRINE HYDROCHLORIDE 100 MCG: 10 INJECTION INTRAVENOUS at 12:09

## 2017-09-22 RX ADMIN — FENTANYL CITRATE 50 MCG: 50 INJECTION, SOLUTION INTRAMUSCULAR; INTRAVENOUS at 04:09

## 2017-09-22 RX ADMIN — HYDRALAZINE HYDROCHLORIDE 10 MG: 20 INJECTION INTRAMUSCULAR; INTRAVENOUS at 08:09

## 2017-09-22 RX ADMIN — ROCURONIUM BROMIDE 10 MG: 10 INJECTION, SOLUTION INTRAVENOUS at 05:09

## 2017-09-22 RX ADMIN — MIDAZOLAM HYDROCHLORIDE 2 MG: 1 INJECTION, SOLUTION INTRAMUSCULAR; INTRAVENOUS at 10:09

## 2017-09-22 RX ADMIN — SODIUM CHLORIDE, SODIUM GLUCONATE, SODIUM ACETATE, POTASSIUM CHLORIDE, MAGNESIUM CHLORIDE, SODIUM PHOSPHATE, DIBASIC, AND POTASSIUM PHOSPHATE: .53; .5; .37; .037; .03; .012; .00082 INJECTION, SOLUTION INTRAVENOUS at 11:09

## 2017-09-22 RX ADMIN — INSULIN ASPART 3 UNITS: 100 INJECTION, SOLUTION INTRAVENOUS; SUBCUTANEOUS at 10:09

## 2017-09-22 RX ADMIN — PHENYLEPHRINE HYDROCHLORIDE 200 MCG: 10 INJECTION INTRAVENOUS at 04:09

## 2017-09-22 RX ADMIN — SODIUM CHLORIDE 2 UNITS/HR: 9 INJECTION, SOLUTION INTRAVENOUS at 12:09

## 2017-09-22 RX ADMIN — FENTANYL CITRATE 100 MCG: 50 INJECTION, SOLUTION INTRAMUSCULAR; INTRAVENOUS at 05:09

## 2017-09-22 RX ADMIN — SODIUM CHLORIDE: 0.9 INJECTION, SOLUTION INTRAVENOUS at 09:09

## 2017-09-22 RX ADMIN — VASOPRESSIN 1 UNITS: 20 INJECTION INTRAVENOUS at 04:09

## 2017-09-22 RX ADMIN — SUGAMMADEX 120 MG: 100 INJECTION, SOLUTION INTRAVENOUS at 06:09

## 2017-09-22 RX ADMIN — SODIUM CHLORIDE: 0.9 INJECTION, SOLUTION INTRAVENOUS at 10:09

## 2017-09-22 RX ADMIN — FENTANYL CITRATE 100 MCG: 50 INJECTION, SOLUTION INTRAMUSCULAR; INTRAVENOUS at 12:09

## 2017-09-22 RX ADMIN — PROPOFOL 30 MG: 10 INJECTION, EMULSION INTRAVENOUS at 12:09

## 2017-09-22 NOTE — PROGRESS NOTES
Pt came up from the OR with a 6.0 Shiley Trach. Pt placed on the vent at the documented settings. Will continue to monitor.

## 2017-09-22 NOTE — INTERVAL H&P NOTE
The patient has been examined and the H&P has been reviewed:    I concur with the findings and no changes have occurred since H&P was written.   Plan to proceed with TL, BND, free flap.     Anesthesia/Surgery risks, benefits and alternative options discussed and understood by patient/family.          Active Hospital Problems    Diagnosis  POA    Laryngeal cancer [C32.9]  Yes      Resolved Hospital Problems    Diagnosis Date Resolved POA   No resolved problems to display.

## 2017-09-22 NOTE — BRIEF OP NOTE
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]    Procedure(s) (LRB):  DISSECTION-NECK (Right)  LARYNGOSCOPY (N/A)  FLAP-ROTATION (N/A)  LARYNGECTOMY (N/A)  DISSECTION-NECK (Left)    Anesthesia: General    Description of Procedure: total laryngectomy c primary closure, bilateral neck dissection (II-IV, VI), total thyroidectomy, R pectoralis major flap    Description of the findings of the procedure: frozen margin negative    Estimated Blood Loss: 400 mL         Specimens:   Specimen (12h ago through future)    Start     Ordered    09/22/17 1644  Specimen to Pathology - Surgery  Once     Comments:  Pre-op Diagnosis: 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 Right neck dissection level 2B, permanent in formalin;2. Right neck dissection level 2A, permanent in formalin; 3. Right neck dissection level 3, permanent in formalin; 4. Right neck dissection level 4, permanent in formalin; 5. Left neck dissection level 2A, permanent in formalin; 6. Left neck dissection level 2B, permanent in formalin; 7. Left neck dissection level 3, permanent in formalin; 8. Left neck dissection level 4, permanent in formalin; 9. Tissue at strap muscle, frozen sent; 10. Total laryngectomy, total thyroidectomy, right paratracheal upper mediastinal (opened on back table)-permanent; 11. Left pharyngeal margin-frozen sent      09/22/17 0468

## 2017-09-22 NOTE — ANESTHESIA PREPROCEDURE EVALUATION
09/22/2017  Luís Esteban is a 47 y.o., male.    Anesthesia Evaluation         Review of Systems  Anesthesia Hx:  No problems with previous Anesthesia   Social:  Non-Smoker    Hematology/Oncology:        Current/Recent Cancer.   Cardiovascular:   Exercise tolerance: good Denies Hypertension.  Denies CAD.     Denies Angina.  Functional Capacity Can you climb two flights of stairs? ==> Yes    Pulmonary:   Denies Asthma.  Denies Recent URI.  Denies Sleep Apnea.    Renal/:  Renal/ Normal     Hepatic/GI:   Denies PUD. Denies Hiatal Hernia.  Denies GERD. Denies Liver Disease.  Denies Hepatitis.    Neurological:   Denies CVA. Denies Seizures.    Endocrine:   Diabetes Denies Hypothyroidism.        Physical Exam  General:  Well nourished    Airway/Jaw/Neck:  Airway Findings: Pre-Existing Airway Tube(s): Tracheostomy tube General Airway Assessment: Adult  TM Distance: Normal, at least 6 cm  Jaw/Neck Findings:  Neck Findings:     Eyes/Ears/Nose:  EYES/EARS/NOSE FINDINGS: Normal   Dental:  Dental Findings:   Chest/Lungs:  Chest/Lungs Findings: Clear to auscultation     Heart/Vascular:  Heart Findings: Rate: Normal  Rhythm: Regular Rhythm  Sounds: Normal        Mental Status:  Mental Status Findings:  Alert and Oriented         Anesthesia Plan  Type of Anesthesia, risks & benefits discussed:  Anesthesia Type:  general  Patient's Preference: Proceed with anesthesia understanding that the risks are very small but could be serious or life threatening.  Intra-op Monitoring Plan: standard ASA monitors  Intra-op Monitoring Plan Comments:   Post Op Pain Control Plan:   Post Op Pain Control Plan Comments:   Induction:   IV  Beta Blocker:  Patient is not currently on a Beta-Blocker (No further documentation required).       Informed Consent: Patient understands risks and agrees with Anesthesia plan.  Questions answered.  Anesthesia consent signed with patient.  ASA Score: 3     Day of Surgery Review of History & Physical: I have interviewed and examined the patient. I have reviewed the patient's H&P dated:            Ready For Surgery From Anesthesia Perspective.

## 2017-09-22 NOTE — TRANSFER OF CARE
"Anesthesia Transfer of Care Note    Patient: Luís Esteban    Procedure(s) Performed: Procedure(s) (LRB):  DISSECTION-NECK (Right)  LARYNGOSCOPY (N/A)  FLAP-ROTATION (N/A)  LARYNGECTOMY (N/A)  DISSECTION-NECK (Left)    Patient location: ICU    Anesthesia Type: general    Transport from OR: Transported from OR intubated on 100% O2 by AMBU with adequate controlled ventilation. Upon arrival to PACU/ICU, patient attached to ventilator and auscultated to confirm bilateral breath sounds and adequate TV. Continuous ECG monitoring in transport. Continuous SpO2 monitoring in transport. Continuos invasive BP monitoring in transport    Post pain: adequate analgesia    Post assessment: no apparent anesthetic complications and tolerated procedure well    Post vital signs: stable    Level of consciousness: sedated    Nausea/Vomiting: no nausea/vomiting    Complications: none    Transfer of care protocol was followedComments: Pt is trached and once to ICU was connected to vent.        Last vitals:   Visit Vitals  BP (!) 145/87   Pulse 78   Temp 37.1 °C (98.8 °F) (Oral)   Resp (!) 29   Ht 5' 11" (1.803 m)   Wt 59.9 kg (132 lb)   SpO2 100%   BMI 18.41 kg/m²     "

## 2017-09-22 NOTE — ANESTHESIA PROCEDURE NOTES
Arterial    Diagnosis: Laryngeal cancer    Patient location during procedure: done in OR  Procedure start time: 9/22/2017 11:10 AM  Timeout: 9/22/2017 11:10 AM  Procedure end time: 9/22/2017 11:13 AM  Staffing  Anesthesiologist: JI CANALES  Resident/CRNA: FREDDIE STOCKTON  Performed: resident/CRNA   Anesthesiologist was present at the time of the procedure.  Preanesthetic Checklist  Completed: patient identified, site marked, surgical consent, pre-op evaluation, timeout performed, IV checked, risks and benefits discussed, monitors and equipment checked and anesthesia consent givenArterial  Skin Prep: chlorhexidine gluconate  Local Infiltration: none  Orientation: right  Location: radial  Catheter Size: 20 G  Catheter placement by Anatomical landmarks. Heme positive aspiration all ports.Insertion Attempts: 1  Assessment  Dressing: secured with tape and tegaderm  Patient: Tolerated well

## 2017-09-22 NOTE — PROGRESS NOTES
Dr Brannon called and made aware pt blood glugose-440. New telephone orders noted for 5units of regular insulin IVP now and recheck blood glucose in 30 min.

## 2017-09-22 NOTE — PROGRESS NOTES
Blood glucose repeated and resulted -342. Dr Brannon called and made aware. Order noted to repeat 5units Regular insulin IVP.

## 2017-09-23 LAB
ANION GAP SERPL CALC-SCNC: 11 MMOL/L
BASOPHILS # BLD AUTO: 0.01 K/UL
BASOPHILS NFR BLD: 0.1 %
BUN SERPL-MCNC: 18 MG/DL
CA-I BLDV-SCNC: 1.04 MMOL/L
CALCIUM SERPL-MCNC: 8 MG/DL
CHLORIDE SERPL-SCNC: 100 MMOL/L
CO2 SERPL-SCNC: 26 MMOL/L
CREAT SERPL-MCNC: 0.7 MG/DL
DIFFERENTIAL METHOD: ABNORMAL
EOSINOPHIL # BLD AUTO: 0 K/UL
EOSINOPHIL NFR BLD: 0 %
ERYTHROCYTE [DISTWIDTH] IN BLOOD BY AUTOMATED COUNT: 17 %
EST. GFR  (AFRICAN AMERICAN): >60 ML/MIN/1.73 M^2
EST. GFR  (NON AFRICAN AMERICAN): >60 ML/MIN/1.73 M^2
GLUCOSE SERPL-MCNC: 262 MG/DL
HCT VFR BLD AUTO: 25.8 %
HGB BLD-MCNC: 9.1 G/DL
LACTATE SERPL-SCNC: 0.7 MMOL/L
LACTATE SERPL-SCNC: 1 MMOL/L
LACTATE SERPL-SCNC: 1.2 MMOL/L
LYMPHOCYTES # BLD AUTO: 0.7 K/UL
LYMPHOCYTES NFR BLD: 4.2 %
MAGNESIUM SERPL-MCNC: 1.4 MG/DL
MCH RBC QN AUTO: 31.3 PG
MCHC RBC AUTO-ENTMCNC: 35.3 G/DL
MCV RBC AUTO: 89 FL
MONOCYTES # BLD AUTO: 0.7 K/UL
MONOCYTES NFR BLD: 4.4 %
NEUTROPHILS # BLD AUTO: 14.5 K/UL
NEUTROPHILS NFR BLD: 91.1 %
PHOSPHATE SERPL-MCNC: 3.9 MG/DL
PLATELET # BLD AUTO: 456 K/UL
PMV BLD AUTO: 8.6 FL
POCT GLUCOSE: 147 MG/DL (ref 70–110)
POCT GLUCOSE: 200 MG/DL (ref 70–110)
POCT GLUCOSE: 274 MG/DL (ref 70–110)
POTASSIUM SERPL-SCNC: 4.2 MMOL/L
PTH-INTACT SERPL-MCNC: <5 PG/ML
RBC # BLD AUTO: 2.91 M/UL
SODIUM SERPL-SCNC: 137 MMOL/L
WBC # BLD AUTO: 15.93 K/UL

## 2017-09-23 PROCEDURE — 63600175 PHARM REV CODE 636 W HCPCS: Performed by: ANESTHESIOLOGY

## 2017-09-23 PROCEDURE — 20000000 HC ICU ROOM

## 2017-09-23 PROCEDURE — 85025 COMPLETE CBC W/AUTO DIFF WBC: CPT

## 2017-09-23 PROCEDURE — 25000003 PHARM REV CODE 250: Performed by: ANESTHESIOLOGY

## 2017-09-23 PROCEDURE — 25000003 PHARM REV CODE 250: Performed by: OTOLARYNGOLOGY

## 2017-09-23 PROCEDURE — 83735 ASSAY OF MAGNESIUM: CPT

## 2017-09-23 PROCEDURE — 27000221 HC OXYGEN, UP TO 24 HOURS

## 2017-09-23 PROCEDURE — 94761 N-INVAS EAR/PLS OXIMETRY MLT: CPT

## 2017-09-23 PROCEDURE — 97802 MEDICAL NUTRITION INDIV IN: CPT

## 2017-09-23 PROCEDURE — 63600175 PHARM REV CODE 636 W HCPCS: Performed by: OTOLARYNGOLOGY

## 2017-09-23 PROCEDURE — 99900026 HC AIRWAY MAINTENANCE (STAT)

## 2017-09-23 PROCEDURE — 99900022

## 2017-09-23 PROCEDURE — 63600175 PHARM REV CODE 636 W HCPCS

## 2017-09-23 PROCEDURE — 83605 ASSAY OF LACTIC ACID: CPT | Mod: 91

## 2017-09-23 PROCEDURE — 83970 ASSAY OF PARATHORMONE: CPT

## 2017-09-23 PROCEDURE — 80048 BASIC METABOLIC PNL TOTAL CA: CPT

## 2017-09-23 PROCEDURE — 25000003 PHARM REV CODE 250

## 2017-09-23 PROCEDURE — 94760 N-INVAS EAR/PLS OXIMETRY 1: CPT

## 2017-09-23 PROCEDURE — 82330 ASSAY OF CALCIUM: CPT

## 2017-09-23 PROCEDURE — 84100 ASSAY OF PHOSPHORUS: CPT

## 2017-09-23 PROCEDURE — C9113 INJ PANTOPRAZOLE SODIUM, VIA: HCPCS

## 2017-09-23 RX ORDER — POTASSIUM CHLORIDE 7.45 MG/ML
40 INJECTION INTRAVENOUS
Status: DISCONTINUED | OUTPATIENT
Start: 2017-09-23 | End: 2017-10-02 | Stop reason: HOSPADM

## 2017-09-23 RX ORDER — MAGNESIUM SULFATE HEPTAHYDRATE 40 MG/ML
2 INJECTION, SOLUTION INTRAVENOUS
Status: DISCONTINUED | OUTPATIENT
Start: 2017-09-23 | End: 2017-10-02 | Stop reason: HOSPADM

## 2017-09-23 RX ORDER — POTASSIUM CHLORIDE 7.45 MG/ML
80 INJECTION INTRAVENOUS
Status: DISCONTINUED | OUTPATIENT
Start: 2017-09-23 | End: 2017-10-02 | Stop reason: HOSPADM

## 2017-09-23 RX ORDER — MAGNESIUM SULFATE HEPTAHYDRATE 40 MG/ML
4 INJECTION, SOLUTION INTRAVENOUS
Status: DISCONTINUED | OUTPATIENT
Start: 2017-09-23 | End: 2017-10-02 | Stop reason: HOSPADM

## 2017-09-23 RX ORDER — POTASSIUM CHLORIDE 7.45 MG/ML
60 INJECTION INTRAVENOUS
Status: DISCONTINUED | OUTPATIENT
Start: 2017-09-23 | End: 2017-10-02 | Stop reason: HOSPADM

## 2017-09-23 RX ORDER — CALCIUM CARBONATE 1250 MG/5ML
1000 SUSPENSION ORAL 3 TIMES DAILY
Status: DISCONTINUED | OUTPATIENT
Start: 2017-09-23 | End: 2017-10-02 | Stop reason: HOSPADM

## 2017-09-23 RX ORDER — CALCITRIOL 0.25 UG/1
0.25 CAPSULE ORAL DAILY
Status: DISCONTINUED | OUTPATIENT
Start: 2017-09-23 | End: 2017-09-26

## 2017-09-23 RX ADMIN — HYDROMORPHONE HYDROCHLORIDE 1 MG: 1 INJECTION, SOLUTION INTRAMUSCULAR; INTRAVENOUS; SUBCUTANEOUS at 08:09

## 2017-09-23 RX ADMIN — INSULIN ASPART 4 UNITS: 100 INJECTION, SOLUTION INTRAVENOUS; SUBCUTANEOUS at 04:09

## 2017-09-23 RX ADMIN — MAGNESIUM SULFATE IN WATER 4 G: 40 INJECTION, SOLUTION INTRAVENOUS at 05:09

## 2017-09-23 RX ADMIN — HYDROMORPHONE HYDROCHLORIDE 0.5 MG: 1 INJECTION, SOLUTION INTRAMUSCULAR; INTRAVENOUS; SUBCUTANEOUS at 02:09

## 2017-09-23 RX ADMIN — CALCIUM CARBONATE 1000 MG: 1250 SUSPENSION ORAL at 02:09

## 2017-09-23 RX ADMIN — PANTOPRAZOLE SODIUM 40 MG: 40 INJECTION, POWDER, FOR SOLUTION INTRAVENOUS at 08:09

## 2017-09-23 RX ADMIN — LEVOTHYROXINE SODIUM ANHYDROUS 55 MCG: 100 INJECTION, POWDER, LYOPHILIZED, FOR SOLUTION INTRAVENOUS at 08:09

## 2017-09-23 RX ADMIN — AMPICILLIN SODIUM AND SULBACTAM SODIUM 3 G: 2; 1 INJECTION, POWDER, FOR SOLUTION INTRAMUSCULAR; INTRAVENOUS at 11:09

## 2017-09-23 RX ADMIN — CALCIUM CARBONATE 1000 MG: 1250 SUSPENSION ORAL at 09:09

## 2017-09-23 RX ADMIN — CALCIUM GLUCONATE 1 G: 94 INJECTION, SOLUTION INTRAVENOUS at 09:09

## 2017-09-23 RX ADMIN — AMPICILLIN SODIUM AND SULBACTAM SODIUM 3 G: 2; 1 INJECTION, POWDER, FOR SOLUTION INTRAMUSCULAR; INTRAVENOUS at 05:09

## 2017-09-23 RX ADMIN — INSULIN ASPART 6 UNITS: 100 INJECTION, SOLUTION INTRAVENOUS; SUBCUTANEOUS at 10:09

## 2017-09-23 RX ADMIN — HYDROMORPHONE HYDROCHLORIDE 0.5 MG: 1 INJECTION, SOLUTION INTRAMUSCULAR; INTRAVENOUS; SUBCUTANEOUS at 01:09

## 2017-09-23 RX ADMIN — HYDROMORPHONE HYDROCHLORIDE 0.5 MG: 1 INJECTION, SOLUTION INTRAMUSCULAR; INTRAVENOUS; SUBCUTANEOUS at 08:09

## 2017-09-23 RX ADMIN — INSULIN ASPART 4 UNITS: 100 INJECTION, SOLUTION INTRAVENOUS; SUBCUTANEOUS at 05:09

## 2017-09-23 RX ADMIN — CALCITRIOL 0.25 MCG: 0.25 CAPSULE, LIQUID FILLED ORAL at 11:09

## 2017-09-23 NOTE — PT/OT/SLP EVAL
Speech-Language Pathology Note    Laryngectomy pulmonary kit delivered to room. Formal evaluation to be completed tomorrow, 9/24/17.     CONSUELO Nettles, CCC-SLP  [phone]  9/23/2017

## 2017-09-23 NOTE — SUBJECTIVE & OBJECTIVE
Interval History: NAEON. POD#1 s/p TL, bilateral neck dissections, and right pec flap. Doing well this morning, no significant pain. Breathing well.     Medications:  Continuous Infusions:   Scheduled Meds:   calcitRIOL  0.25 mcg Per G Tube Daily    calcium carbonate  1,000 mg Per G Tube TID    levothyroxine  55 mcg Intravenous Daily    pantoprazole  40 mg Intravenous Daily    [START ON 9/25/2017] pantoprazole  40 mg Per NG tube Daily     PRN Meds:calcium gluconate IVPB, calcium gluconate IVPB, calcium gluconate IVPB, dextrose 50%, diphenhydrAMINE, glucagon (human recombinant), hydrALAZINE, HYDROmorphone, HYDROmorphone, insulin aspart, magnesium sulfate IVPB, magnesium sulfate IVPB, ondansetron, potassium chloride **AND** potassium chloride **AND** potassium chloride, promethazine (PHENERGAN) IVPB     Review of patient's allergies indicates:  No Known Allergies  Objective:     Vital Signs (24h Range):  Temp:  [97.7 °F (36.5 °C)-98.9 °F (37.2 °C)] 98.2 °F (36.8 °C)  Pulse:  [] 86  Resp:  [10-29] 17  SpO2:  [98 %-100 %] 100 %  BP: (124-159)/(71-87) 140/74  Arterial Line BP: ()/(60-98) 111/64       Date 09/23/17 0700 - 09/24/17 0659   Shift 1225-4463 8133-7130 [id number] 24 Hour Total   I  N  T  A  K  E   Shift Total  (mL/kg)       O  U  T  P  U  T   Urine  (mL/kg/hr) 90   90    Shift Total  (mL/kg) 90  (1.5)   90  (1.5)   Weight (kg) 60 60 60 60     Lines/Drains/Airways     Drain                 Gastrostomy/Enterostomy 08/31/17 1900 22 days         Closed/Suction Drain 09/22/17 1708 Medial Neck Bulb 15 Fr. less than 1 day         Closed/Suction Drain 09/22/17 1708 Right Neck Bulb 15 Fr. less than 1 day         Closed/Suction Drain 09/22/17 1709 Left Neck Bulb 15 Fr. less than 1 day         Closed/Suction Drain 09/22/17 1709 Right Chest Bulb 15 Fr. less than 1 day         Closed/Suction Drain 09/22/17 1710 Right Chest Bulb 15 Fr. less than 1 day         Urethral Catheter 09/22/17 1100 Temperature  probe;Non-latex 16 Fr. less than 1 day          Airway                 Surgical Airway 08/31/17 0734 Shiley Cuffed 23 days          Arterial Line                 Arterial Line 09/22/17 1110 Right Radial less than 1 day          Peripheral Intravenous Line                 Peripheral IV - Single Lumen 09/22/17 0835 Left Forearm 1 day         Peripheral IV - Single Lumen 09/22/17 1110 Right Hand less than 1 day                Physical Exam  NAD, lying in bed  Neck incisions clean, dry, intact, no erythema or edema  Laryngectomy stoma intact, 6.0 cuffed shiley trach in place  Right chest incision with island dressing in place, mildly saturated. No evidence of hematoma or seroma  HUNTER drains in bilateral necks and right chest holding suction with serosanguinous drainage  Right lateral neck: 84 cc  Right medial neck: 46 cc  Left neck: 126 cc  Right chest 1: 122  Right chest 2: 89    Significant Labs:  CBC:   Recent Labs  Lab 09/23/17  0302   WBC 15.93*   RBC 2.91*   HGB 9.1*   HCT 25.8*   *   MCV 89   MCH 31.3*   MCHC 35.3     CMP:   Recent Labs  Lab 09/22/17  1857 09/23/17  0302   * 262*   CALCIUM 7.9* 8.0*   ALBUMIN 2.2*  --    PROT 5.7*  --     137   K 4.2 4.2   CO2 26 26    100   BUN 23* 18   CREATININE 0.7 0.7   ALKPHOS 60  --    ALT 8*  --    AST 21  --    BILITOT 0.7  --      PTH:   Recent Labs  Lab 09/23/17  0822   PTH <5.0*       Significant Diagnostics:  None

## 2017-09-23 NOTE — PLAN OF CARE
Problem: Patient Care Overview  Goal: Plan of Care Review  Outcome: Ongoing (interventions implemented as appropriate)  1. TF recommendations:    - Continuous: Glucerna 1.5 @ 50 mL/hr to provide 1800 calories, 99 g of protein, 911 mL fluid.   - Bolus: Glucerna 1.5 - 5 cans/day to provide 1780 calories, 98 g of protein, 900 mL fluid.  2. RD to monitor & follow-up.

## 2017-09-23 NOTE — PROGRESS NOTES
[facility]  Otorhinolaryngology-Head & Neck Surgery  Progress Note    Subjective:     Post-Op Info:  Procedure(s) (LRB):  DISSECTION-NECK (Right)  LARYNGOSCOPY (N/A)  FLAP-ROTATION (N/A)  LARYNGECTOMY (N/A)  DISSECTION-NECK (Left)   1 Day Post-Op  Hospital Day: 2     Interval History: NAEON. POD#1 s/p TL, bilateral neck dissections, and right pec flap. Doing well this morning, no significant pain. Breathing well.     Medications:  Continuous Infusions:   Scheduled Meds:   calcitRIOL  0.25 mcg Per G Tube Daily    calcium carbonate  1,000 mg Per G Tube TID    levothyroxine  55 mcg Intravenous Daily    pantoprazole  40 mg Intravenous Daily    [START ON 9/25/2017] pantoprazole  40 mg Per NG tube Daily     PRN Meds:calcium gluconate IVPB, calcium gluconate IVPB, calcium gluconate IVPB, dextrose 50%, diphenhydrAMINE, glucagon (human recombinant), hydrALAZINE, HYDROmorphone, HYDROmorphone, insulin aspart, magnesium sulfate IVPB, magnesium sulfate IVPB, ondansetron, potassium chloride **AND** potassium chloride **AND** potassium chloride, promethazine (PHENERGAN) IVPB     Review of patient's allergies indicates:  No Known Allergies  Objective:     Vital Signs (24h Range):  Temp:  [97.7 °F (36.5 °C)-98.9 °F (37.2 °C)] 98.2 °F (36.8 °C)  Pulse:  [] 86  Resp:  [10-29] 17  SpO2:  [98 %-100 %] 100 %  BP: (124-159)/(71-87) 140/74  Arterial Line BP: ()/(60-98) 111/64       Date 09/23/17 0700 - 09/24/17 0659   Shift 6278-3306 8168-1853 1633-5128 24 Hour Total   I  N  T  A  K  E   Shift Total  (mL/kg)       O  U  T  P  U  T   Urine  (mL/kg/hr) 90   90    Shift Total  (mL/kg) 90  (1.5)   90  (1.5)   Weight (kg) 60 60 60 60     Lines/Drains/Airways     Drain                 Gastrostomy/Enterostomy 08/31/17 1900 22 days         Closed/Suction Drain 09/22/17 1708 Medial Neck Bulb 15 Fr. less than 1 day         Closed/Suction Drain 09/22/17 1708 Right Neck Bulb 15 Fr. less than 1 day          Closed/Suction Drain 09/22/17 1709 Left Neck Bulb 15 Fr. less than 1 day         Closed/Suction Drain 09/22/17 1709 Right Chest Bulb 15 Fr. less than 1 day         Closed/Suction Drain 09/22/17 1710 Right Chest Bulb 15 Fr. less than 1 day         Urethral Catheter 09/22/17 1100 Temperature probe;Non-latex 16 Fr. less than 1 day          Airway                 Surgical Airway 08/31/17 0734 Shiley Cuffed 23 days          Arterial Line                 Arterial Line 09/22/17 1110 Right Radial less than 1 day          Peripheral Intravenous Line                 Peripheral IV - Single Lumen 09/22/17 0835 Left Forearm 1 day         Peripheral IV - Single Lumen 09/22/17 1110 Right Hand less than 1 day                Physical Exam  NAD, lying in bed  Neck incisions clean, dry, intact, no erythema or edema  Laryngectomy stoma intact, 6.0 cuffed shiley trach in place  Right chest incision with island dressing in place, mildly saturated. No evidence of hematoma or seroma  HUNTER drains in bilateral necks and right chest holding suction with serosanguinous drainage  Right lateral neck: 84 cc  Right medial neck: 46 cc  Left neck: 126 cc  Right chest 1: 122  Right chest 2: 89    Significant Labs:  CBC:   Recent Labs  Lab 09/23/17  0302   WBC 15.93*   RBC 2.91*   HGB 9.1*   HCT 25.8*   *   MCV 89   MCH 31.3*   MCHC 35.3     CMP:   Recent Labs  Lab 09/22/17  1857 09/23/17  0302   * 262*   CALCIUM 7.9* 8.0*   ALBUMIN 2.2*  --    PROT 5.7*  --     137   K 4.2 4.2   CO2 26 26    100   BUN 23* 18   CREATININE 0.7 0.7   ALKPHOS 60  --    ALT 8*  --    AST 21  --    BILITOT 0.7  --      PTH:   Recent Labs  Lab 09/23/17  0822   PTH <5.0*       Significant Diagnostics:  None    Assessment/Plan:     Laryngeal cancer    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     ENT:  -keep JPs  -no flap checks   -bacitracin  to wounds  -trach tube in aleksandra stoma  -advance to day 1 of the pathway     Neuro:  - analgesia prn  -anti-emetics prn     Resp:  -humidified 02 via trach collar     CV:  - HDS; d/c art line     Heme/ID:  - Unasyn empiric post op coverage      Renal:  - Martines in place; d/c once UOP increases (defer to SICU)  - Strict I/Os     FEN/GI:  - strict NPO  -will start TF  via G tube per pathway (POD 2)              -f/u nutrition recs  - Replace lytes PRN     Endo:  -postop PTH low, start rocaltrol, calcium carbonate  -q8h ionized ca  -synthroid 55 mcg IV started  -glucose in 200s, continue treatment per SICU     PPX:  -Pt/OT; OOB  - protonix  - Lovenox      Dispo:  - Continue ICU care            Bouchra Chavarria MD  Otorhinolaryngology-Head & Neck Surgery  [facility]

## 2017-09-23 NOTE — H&P
"History & Physical  Surgical Intensive Care    SUBJECTIVE:     Chief Complaint/Reason for Admission: s/p neck dissection, laryngoscopy, rotational pec flap, laryngectomy, neck dissection    History of Present Illness:  Patient is a 47 y.o. male w/ PMH laryngeal cancer, DM2, previous G tube placement who is POD 0 from above listed surgeries. Pt is awake and weaning to TC. HDS off drips. Pain well controlled.     PTA Medications   Medication Sig    insulin aspart (NOVOLOG) 100 unit/mL InPn pen Inject 6 units with meals, 10 units with meals + bolus Tube feeding, 4 units for just bolus tube feeding. + correction scale.    insulin detemir (LEVEMIR FLEXTOUCH) 100 unit/mL (3 mL) SubQ InPn pen Inject 16 Units into the skin once daily.    oxycodone (ROXICODONE) 5 mg/5 mL Soln 10 mLs (10 mg total) by Per G Tube route every 3 (three) hours as needed.    pen needle, diabetic (BD ULTRA-FINE OLESYA PEN NEEDLES) 32 gauge x 5/32" Ndle To use 6 times daily with insulin injections.       Review of patient's allergies indicates:  No Known Allergies    Past Medical History:   Diagnosis Date    Insulin dependent diabetes mellitus     Kidney failure     Laryngeal cancer     Tracheostomy dependence      Past Surgical History:   Procedure Laterality Date    DIRECT LARYNGOBRONCHOSCOPY  08/31/2017     History reviewed. No pertinent family history.  Social History   Substance Use Topics    Smoking status: Former Smoker    Smokeless tobacco: Never Used    Alcohol use No        Review of Systems:  Review of systems not obtained due to patient factors pt non-communicative.    OBJECTIVE:     Vital Signs (Most Recent)  Temp: 97.7 °F (36.5 °C) (09/22/17 1901)  Pulse: 78 (09/22/17 2215)  Resp: 19 (09/22/17 2215)  BP: (!) 141/74 (09/22/17 2200)  SpO2: 100 % (09/22/17 2215)  Ventilator Data (Last 24H):     Vent Mode: Spont  Oxygen Concentration (%):  [] 40  Resp Rate Total:  [15 br/min-26 br/min] 19 br/min  Vt Set:  [500 mL] 500 " mL  PEEP/CPAP:  [5 cmH20] 5 cmH20  Pressure Support:  [0 cmH20-10 cmH20] 10 cmH20  Mean Airway Pressure:  [8.9 wxW19-60 cmH20] 8.9 cmH20    Hemodynamic Parameters (Last 24H):       Physical Exam:  General: well developed, well nourished  HENT: Head:normocephalic, atraumatic. Ears:bilateral TM's and external ear canals normal. Nose: Nares normal. Septum midline. Mucosa normal. No drainage or sinus tenderness., no discharge. Throat: lips, mucosa, and tongue normal; teeth and gums normal and no throat erythema.  Eyes: conjunctivae/corneas clear. PERRL.   Neck: supple, symmetrical, trachea midline, no JVD and thyroid not enlarged, symmetric, no tenderness/mass/nodules  Lungs:  clear to auscultation bilaterally and normal respiratory effort  Cardiovascular: Heart: regular rate and rhythm, S1, S2 normal, no murmur, click, rub or gallop. Chest Wall: no tenderness. Extremities: no cyanosis or edema, or clubbing. Pulses: 2+ and symmetric.  Abdomen/Rectal: Abdomen: soft, non-tender non-distented; bowel sounds normal; no masses,  no organomegaly. Rectal: not examined  Skin: Skin color, texture, turgor normal. No rashes or lesions  Musculoskeletal:no clubbing, cyanosis  Neurologic: Normal strength and tone. No focal numbness or weakness  Psych/Behavioral:  Alert and oriented, appropriate affect.    Lines/Drains:       Peripheral IV - Single Lumen 09/22/17 0835 Left Forearm (Active)   Site Assessment Clean;Dry;Intact;No redness;No swelling;No warmth;No drainage 9/22/2017  7:01 PM   Line Status Flushed;Saline locked 9/22/2017  7:01 PM   Dressing Status Clean;Dry;Intact 9/22/2017  7:01 PM   Dressing Intervention New dressing 9/22/2017  7:01 PM   Dressing Change Due 09/26/17 9/22/2017  7:01 PM   Site Change Due 09/26/17 9/22/2017  7:01 PM   Reason Not Rotated Not due 9/22/2017  7:01 PM   Number of days: 0            Peripheral IV - Single Lumen 09/22/17 1110 Right Hand (Active)   Site Assessment Clean;Dry;Intact;No warmth;No  drainage;No redness;No swelling 9/22/2017  7:01 PM   Line Status Flushed;Saline locked 9/22/2017  7:01 PM   Dressing Status Clean;Dry;Intact 9/22/2017  7:01 PM   Dressing Intervention Dressing reinforced 9/22/2017  7:01 PM   Dressing Change Due 09/26/17 9/22/2017  7:01 PM   Site Change Due 09/26/17 9/22/2017  7:01 PM   Reason Not Rotated Not due 9/22/2017  7:01 PM   Number of days: 0            Arterial Line 09/22/17 1110 Right Radial (Active)   Site Assessment Clean;Dry;Intact;No redness;No swelling;No warmth;No drainage 9/22/2017  7:01 PM   Line Status Pulsatile blood flow 9/22/2017  7:01 PM   Art Line Waveform Appropriate 9/22/2017  7:01 PM   Arterial Line Interventions Zeroed and calibrated;Leveled;Connections checked and tightened;Flushed per protocol;Line pulled back 9/22/2017  7:01 PM   Color/Movement/Sensation Capillary refill less than 3 sec 9/22/2017  7:01 PM   Dressing Type Transparent 9/22/2017  7:01 PM   Dressing Status Clean;Dry;Intact 9/22/2017  7:01 PM   Dressing Intervention New dressing 9/22/2017  7:01 PM   Dressing Change Due 09/27/17 9/22/2017  7:01 PM   Number of days: 0            Closed/Suction Drain 09/22/17 1708 Right Neck Bulb 15 Fr. (Active)   Site Description Healing 9/22/2017  7:01 PM   Dressing Type No dressing 9/22/2017  7:01 PM   Drainage Sanguineous 9/22/2017  7:01 PM   Status To bulb suction 9/22/2017  7:01 PM   Output (mL) 18 mL 9/22/2017 10:00 PM   Number of days: 0            Closed/Suction Drain 09/22/17 1708 Medial Neck Bulb 15 Fr. (Active)   Site Description Healing 9/22/2017  7:01 PM   Dressing Type No dressing 9/22/2017  7:01 PM   Drainage Sanguineous 9/22/2017  7:01 PM   Status To bulb suction 9/22/2017  7:01 PM   Output (mL) 11 mL 9/22/2017 10:00 PM   Number of days: 0            Closed/Suction Drain 09/22/17 1709 Left Neck Bulb 15 Fr. (Active)   Site Description Healing 9/22/2017  7:01 PM   Dressing Type No dressing 9/22/2017  7:01 PM   Drainage Sanguineous 9/22/2017   7:01 PM   Status To bulb suction 9/22/2017  7:01 PM   Output (mL) 40 mL 9/22/2017 10:00 PM   Number of days: 0            Closed/Suction Drain 09/22/17 1709 Right Chest Bulb 15 Fr. (Active)   Site Description Unable to view 9/22/2017  7:01 PM   Dressing Type [place] 9/22/2017  7:01 PM   Dressing Status Intact;New drainage 9/22/2017  7:01 PM   Dressing Intervention Dressing reinforced 9/22/2017  7:01 PM   Drainage Sanguineous 9/22/2017  7:01 PM   Status To bulb suction 9/22/2017  7:01 PM   Output (mL) 30 mL 9/22/2017 10:00 PM   Number of days: 0            Closed/Suction Drain 09/22/17 1710 Right Chest Bulb 15 Fr. (Active)   Site Description Unable to view 9/22/2017  7:01 PM   Dressing Type [place] 9/22/2017  7:01 PM   Dressing Status Intact;Dry 9/22/2017  7:01 PM   Dressing Intervention Dressing reinforced 9/22/2017  7:01 PM   Drainage Sanguineous 9/22/2017  7:01 PM   Status To bulb suction 9/22/2017  7:01 PM   Output (mL) 30 mL 9/22/2017 10:00 PM   Number of days: 0            Gastrostomy/Enterostomy 08/31/17 1900 (Active)   Securement anchored to abdomen w/ adhesive device 9/22/2017  7:01 PM   Interventions Prior to Feeding patency checked;residual checked 9/6/2017  7:50 PM   Suction Setting/Drainage Method dependent drainage 9/6/2017  7:27 AM   Drainage yellow 9/22/2017  9:00 AM   Feeding Type bolus 9/6/2017  7:50 PM   Clamp Status/Tolerance clamped;no abdominal discomfort;no abdominal distention;no emesis;no nausea;no restlessness 9/22/2017  7:01 PM   Feeding Action feeding held 9/22/2017  7:01 PM   Dressing no dressing 9/22/2017  7:01 PM   Insertion Site serous drainage;yellow drainage 9/22/2017  9:00 AM   Site Care outer bumper rotated 9/22/2017  7:01 PM   Flush/Irrigation flushed w/;water 9/6/2017  7:27 AM   Current Rate (mL/hr) 45 mL/hr 9/5/2017  7:38 AM   Goal Rate (mL/hr) 45 mL/hr 9/5/2017  7:38 AM   Intake (mL) 30 mL 9/2/2017  4:00 PM   Water Bolus (mL) 500 mL 9/6/2017  6:26 PM   Tube  "Output(mL)(Include Discarded Residual) 120 mL 9/3/2017 11:25 AM   Tube Feeding Intake (mL) 300 9/6/2017  6:26 PM   Residual Amount (ml) 20 ml 9/6/2017  7:50 PM   Number of days: 22            Urethral Catheter 09/22/17 1100 Temperature probe;Non-latex 16 Fr. (Active)   Site Assessment Clean;Intact 9/22/2017  7:01 PM   Collection Container Urimeter 9/22/2017  7:01 PM   Securement Method secured to top of thigh w/ adhesive device 9/22/2017  7:01 PM   Catheter Care Performed yes 9/22/2017  7:01 PM   Reason for Continuing Urinary Catheterization Post operative 9/22/2017  7:01 PM   CAUTI Prevention Bundle StatLock in place w 1" slack;Intact seal between catheter & drainage tubing;Drainage bag off the floor;Green sheeting clip in use;No dependent loops or kinks;Drainage bag not overfilled (<2/3 full);Drainage bag below bladder 9/22/2017  7:01 PM   Output (mL) 140 mL 9/22/2017 10:00 PM   Number of days: 0       Laboratory  CBC:   Recent Labs  Lab 09/22/17  1857   WBC 15.46*   RBC 2.73*   HGB 8.4*   HCT 24.3*   *   MCV 89   MCH 30.8   MCHC 34.6     CMP:   Recent Labs  Lab 09/22/17  1857   *   CALCIUM 7.9*   ALBUMIN 2.2*   PROT 5.7*      K 4.2   CO2 26      BUN 23*   CREATININE 0.7   ALKPHOS 60   ALT 8*   AST 21   BILITOT 0.7     Cardiac markers: No results for input(s): CKMB, CPKMB, TROPONINT, TROPONINI, MYOGLOBIN in the last 168 hours.  ABGs:   Recent Labs  Lab 09/22/17  2259   PH 7.371   PCO2 43.0   PO2 181*   HCO3 24.9   POCSATURATED 100   BE 0       Chest X-Ray: I personally reviewed the films and findings are:, normal    Diagnostic Results:  No Further    ASSESSMENT/PLAN:       Plan:  Neuro:   -A&Ox3    Pulmonary:   -Wean to TC aggressively  -Pt with laryngeal stoma    Cardiac:  -HDS off pressors  -Lactate 0.7. Will trend    Renal:   -BUN/Cr wnl  -UOP stable  -Continue to monitor    Infectious Disease:   -Afebrile  -Monitor for s/s of infection  -No abx    Hematology/Oncology:  --Trend post op " H/h  -Transfuse PRN  -Monitor for signs of blood loss     Endocrine:  -ISS PRN  -Accuchecks  -May require insulin gtt    Fluids/Electrolytes/Nutrition/GI:   -Replete lytes PRN  -NPO for now   -PEG tube to gravity    Pain Management:   -Dilaudid IV PRN  -Transition to PO once able to take PO    Dispo: Continue ICU care    Nicholas Damon MD  PGY-4 Anesthesiology  P: [phone]

## 2017-09-23 NOTE — CONSULTS
"  [facility]  Adult Nutrition  Consult Note    SUMMARY     Recommendations    1. TF recommendations:    - Continuous: Glucerna 1.5 @ 50 mL/hr to provide 1800 calories, 99 g of protein, 911 mL fluid.   - Bolus: Glucerna 1.5 - 5 cans/day to provide 1780 calories, 98 g of protein, 900 mL fluid.  2. RD to monitor & follow-up.    Goals: Meet % EEN, EPN  Nutrition Goal Status: new  Communication of RD Recs: reviewed with RN    Reason for Assessment    Reason for Assessment: physician consult  Diagnosis: other (see comments) (Laryngeal cancer)  Relevent Medical History: DM   Interdisciplinary Rounds: did not attend     General Information Comments: POD 1 s/p neck dissection, laryngectomy. PEG present. MD requesting bolus/continuousTF recommendations.  Nutrition Discharge Planning: Adequate nutrition via TF.    Nutrition Prescription Ordered    Current Diet Order: NPO    Nutrition/Diet History    Patient Reported Diet/Restrictions/Preferences: other (see comments) (BRADY)     Factors Affecting Nutritional Intake: NPO    Labs/Tests/Procedures/Meds    Pertinent Labs Reviewed: reviewed, pertinent  Pertinent Labs Comments: Gluc 142-453, A1C 9.6  Pertinent Medications Reviewed: reviewed, pertinent  Pertinent Medications Comments: Insulin    Physical Findings    Overall Physical Appearance: underweight, other (see comments) (Trach collar)  Tubes: gastrostomy tube  Oral/Mouth Cavity: WDL  Skin: other (see comments) (Incision/drain in neck)    Anthropometrics    Height: 5' 11" (180.3 cm)  Weight Method: Stated  Weight: 60 kg (132 lb 4.4 oz)    Ideal Body Weight (IBW), Male: 172 lb  % Ideal Body Weight, Male (lb): 76.91 lb     BMI (Calculated): 18.5  BMI Grade: 18.5-24.9 - normal    Estimated/Assessed Needs    Weight Used For Calorie Calculations: 60 kg (132 lb 4.4 oz)      Energy Calorie Requirements (kcal): 5483-7451 kcal/d  Energy Need Method: Kcal/kg (30-35 kcal/kg)     Weight Used For Protein " Calculations: 60 kg (132 lb 4.4 oz)  Protein Requirements: 78-90 g/d (1.3-1.5 g/kg)     Fluid Need Method: other (see comments), RDA Method (Per MD or 1 mL/kcal)     CHO Requirement: 50% total kcals     Assessment and Plan    Increased nutrient needs related to physiological causes as evidenced by s/p laryngectomy.  Status: New    Monitor and Evaluation    Food and Nutrient Intake: enteral nutrition intake  Food and Nutrient Adminstration: enteral and parenteral nutrition administration     Physical Activity and Function: nutrition-related ADLs and IADLs  Anthropometric Measurements: weight change, weight, body mass index  Biochemical Data, Medical Tests and Procedures: lipid profile, inflammatory profile, glucose/endocrine profile, gastrointestinal profile, electrolyte and renal panel  Nutrition-Focused Physical Findings: overall appearance    Nutrition Risk    Level of Risk: other (see comments) (2x/week)    Nutrition Follow-Up    RD Follow-up?: Yes

## 2017-09-23 NOTE — PLAN OF CARE
Problem: Patient Care Overview  Goal: Plan of Care Review  Outcome: Ongoing (interventions implemented as appropriate)  Patient and patient's mother updated on plan of care. No acute events over night. Vital signs stable. PRN 10mg hydralazine given once due to SBP > 160. Patient weaned from ventilator to 35% Trach collar with oxygen saturation ranging from %. Patient is awake and alert; follows commands, and nods appropriately. 5 HUNTER drains sutured and intact with continuously decreasing amount of sanguineous to serosanguineous drainage. Urine output ranging from  ml/hr. PRN Dilaudid 0.5mg and 1mg ordered for pain. No skin breakdown to sacrum, heels, or elbows. All questions and concerns acknowledged and answered. Will continue to monitor closely.

## 2017-09-23 NOTE — NURSING
Patient transferred to SICU room 6065 from 6097. Patient transferred connected to cardiac monitoring and supplemental oxygen. IV pole, ambu bag, and medications brought with the patient, along with the patient's chart and personal belongings. VS stable. Bed in lowest position, call bell in reach, and patient connected to SICU room 6065 monitor. Will continue to monitor closely.

## 2017-09-23 NOTE — ASSESSMENT & PLAN NOTE
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     ENT:  -keep JPs  -no flap checks   -bacitracin to wounds  -trach tube in aleksandra stoma  -advance to day 1 of the pathway     Neuro:  - analgesia prn  -anti-emetics prn     Resp:  -humidified 02 via trach collar     CV:  - HDS; d/c art line     Heme/ID:  - Unasyn empiric post op coverage      Renal:  - Martines in place; d/c once UOP increases (defer to SICU)  - Strict I/Os     FEN/GI:  - strict NPO  -will start TF  via G tube per pathway (POD 2)              -f/u nutrition recs  - Replace lytes PRN     Endo:  -postop PTH low, start rocaltrol, calcium carbonate  -q8h ionized ca  -synthroid 55 mcg IV started  -glucose in 200s, continue treatment per SICU     PPX:  -Pt/OT; OOB  - protonix  - Lovenox      Dispo:  - Continue ICU care

## 2017-09-23 NOTE — PLAN OF CARE
Problem: Patient Care Overview  Goal: Plan of Care Review  Outcome: Ongoing (interventions implemented as appropriate)  AAOx4. VSS throughout shift. No significant events throughout shift. Trach collar with sats . A line removed. 5 HUNTER drains with serosanguinous drainage. Pt and family updated on POC throughout shift. Will continue to monitor very closely. See flowsheet for full assessments.

## 2017-09-24 PROBLEM — E89.0 HYPOTHYROIDISM, POSTSURGICAL: Status: ACTIVE | Noted: 2017-09-24

## 2017-09-24 PROBLEM — E89.2 POST-SURGICAL HYPOPARATHYROIDISM: Status: ACTIVE | Noted: 2017-09-24

## 2017-09-24 LAB
ALBUMIN SERPL BCP-MCNC: 2.1 G/DL
ALP SERPL-CCNC: 75 U/L
ALT SERPL W/O P-5'-P-CCNC: 17 U/L
ANION GAP SERPL CALC-SCNC: 13 MMOL/L
AST SERPL-CCNC: 23 U/L
BASOPHILS # BLD AUTO: 0.01 K/UL
BASOPHILS NFR BLD: 0.1 %
BILIRUB SERPL-MCNC: 0.3 MG/DL
BUN SERPL-MCNC: 22 MG/DL
CA-I BLDV-SCNC: 1.04 MMOL/L
CA-I BLDV-SCNC: 1.06 MMOL/L
CA-I BLDV-SCNC: 1.11 MMOL/L
CALCIUM SERPL-MCNC: 8.5 MG/DL
CHLORIDE SERPL-SCNC: 95 MMOL/L
CO2 SERPL-SCNC: 26 MMOL/L
CREAT SERPL-MCNC: 0.8 MG/DL
DIFFERENTIAL METHOD: ABNORMAL
EOSINOPHIL # BLD AUTO: 0 K/UL
EOSINOPHIL NFR BLD: 0 %
ERYTHROCYTE [DISTWIDTH] IN BLOOD BY AUTOMATED COUNT: 17.2 %
EST. GFR  (AFRICAN AMERICAN): >60 ML/MIN/1.73 M^2
EST. GFR  (NON AFRICAN AMERICAN): >60 ML/MIN/1.73 M^2
GLUCOSE SERPL-MCNC: 414 MG/DL
HCT VFR BLD AUTO: 25.8 %
HGB BLD-MCNC: 8.7 G/DL
LACTATE SERPL-SCNC: 0.8 MMOL/L
LACTATE SERPL-SCNC: 0.9 MMOL/L
LACTATE SERPL-SCNC: 1 MMOL/L
LYMPHOCYTES # BLD AUTO: 0.7 K/UL
LYMPHOCYTES NFR BLD: 4.9 %
MCH RBC QN AUTO: 31.2 PG
MCHC RBC AUTO-ENTMCNC: 33.7 G/DL
MCV RBC AUTO: 93 FL
MONOCYTES # BLD AUTO: 0.7 K/UL
MONOCYTES NFR BLD: 4.9 %
NEUTROPHILS # BLD AUTO: 13.3 K/UL
NEUTROPHILS NFR BLD: 89.9 %
PLATELET # BLD AUTO: 403 K/UL
PMV BLD AUTO: 9.2 FL
POCT GLUCOSE: 239 MG/DL (ref 70–110)
POCT GLUCOSE: 269 MG/DL (ref 70–110)
POCT GLUCOSE: 317 MG/DL (ref 70–110)
POTASSIUM SERPL-SCNC: 4.5 MMOL/L
PROT SERPL-MCNC: 6.4 G/DL
RBC # BLD AUTO: 2.79 M/UL
SODIUM SERPL-SCNC: 134 MMOL/L
WBC # BLD AUTO: 14.73 K/UL

## 2017-09-24 PROCEDURE — 99900022

## 2017-09-24 PROCEDURE — 82330 ASSAY OF CALCIUM: CPT

## 2017-09-24 PROCEDURE — 63600175 PHARM REV CODE 636 W HCPCS

## 2017-09-24 PROCEDURE — 25000003 PHARM REV CODE 250: Performed by: ANESTHESIOLOGY

## 2017-09-24 PROCEDURE — 99900026 HC AIRWAY MAINTENANCE (STAT)

## 2017-09-24 PROCEDURE — 25000003 PHARM REV CODE 250: Performed by: INTERNAL MEDICINE

## 2017-09-24 PROCEDURE — 63600175 PHARM REV CODE 636 W HCPCS: Performed by: OTOLARYNGOLOGY

## 2017-09-24 PROCEDURE — 25000003 PHARM REV CODE 250

## 2017-09-24 PROCEDURE — 83605 ASSAY OF LACTIC ACID: CPT | Mod: 91

## 2017-09-24 PROCEDURE — 63600175 PHARM REV CODE 636 W HCPCS: Performed by: INTERNAL MEDICINE

## 2017-09-24 PROCEDURE — 20000000 HC ICU ROOM

## 2017-09-24 PROCEDURE — 25000003 PHARM REV CODE 250: Performed by: OTOLARYNGOLOGY

## 2017-09-24 PROCEDURE — C9113 INJ PANTOPRAZOLE SODIUM, VIA: HCPCS

## 2017-09-24 PROCEDURE — 80053 COMPREHEN METABOLIC PANEL: CPT

## 2017-09-24 PROCEDURE — 27000221 HC OXYGEN, UP TO 24 HOURS

## 2017-09-24 PROCEDURE — 82330 ASSAY OF CALCIUM: CPT | Mod: 91

## 2017-09-24 PROCEDURE — 94761 N-INVAS EAR/PLS OXIMETRY MLT: CPT

## 2017-09-24 PROCEDURE — 85025 COMPLETE CBC W/AUTO DIFF WBC: CPT

## 2017-09-24 PROCEDURE — 63600175 PHARM REV CODE 636 W HCPCS: Performed by: ANESTHESIOLOGY

## 2017-09-24 PROCEDURE — 99233 SBSQ HOSP IP/OBS HIGH 50: CPT | Mod: ,,, | Performed by: INTERNAL MEDICINE

## 2017-09-24 PROCEDURE — 92597 ORAL SPEECH DEVICE EVAL: CPT

## 2017-09-24 RX ORDER — ENOXAPARIN SODIUM 100 MG/ML
40 INJECTION SUBCUTANEOUS EVERY 24 HOURS
Status: DISCONTINUED | OUTPATIENT
Start: 2017-09-24 | End: 2017-10-02 | Stop reason: HOSPADM

## 2017-09-24 RX ADMIN — INSULIN ASPART 4 UNITS: 100 INJECTION, SOLUTION INTRAVENOUS; SUBCUTANEOUS at 12:09

## 2017-09-24 RX ADMIN — AMPICILLIN SODIUM AND SULBACTAM SODIUM 3 G: 2; 1 INJECTION, POWDER, FOR SOLUTION INTRAMUSCULAR; INTRAVENOUS at 05:09

## 2017-09-24 RX ADMIN — LEVOTHYROXINE SODIUM ANHYDROUS 55 MCG: 100 INJECTION, POWDER, LYOPHILIZED, FOR SOLUTION INTRAVENOUS at 08:09

## 2017-09-24 RX ADMIN — HYDROMORPHONE HYDROCHLORIDE 1 MG: 1 INJECTION, SOLUTION INTRAMUSCULAR; INTRAVENOUS; SUBCUTANEOUS at 07:09

## 2017-09-24 RX ADMIN — SODIUM CHLORIDE 1.5 UNITS/HR: 9 INJECTION, SOLUTION INTRAVENOUS at 10:09

## 2017-09-24 RX ADMIN — AMPICILLIN SODIUM AND SULBACTAM SODIUM 3 G: 2; 1 INJECTION, POWDER, FOR SOLUTION INTRAMUSCULAR; INTRAVENOUS at 11:09

## 2017-09-24 RX ADMIN — INSULIN ASPART 10 UNITS: 100 INJECTION, SOLUTION INTRAVENOUS; SUBCUTANEOUS at 05:09

## 2017-09-24 RX ADMIN — HYDROMORPHONE HYDROCHLORIDE 0.5 MG: 1 INJECTION, SOLUTION INTRAMUSCULAR; INTRAVENOUS; SUBCUTANEOUS at 08:09

## 2017-09-24 RX ADMIN — HYDROMORPHONE HYDROCHLORIDE 0.5 MG: 1 INJECTION, SOLUTION INTRAMUSCULAR; INTRAVENOUS; SUBCUTANEOUS at 06:09

## 2017-09-24 RX ADMIN — AMPICILLIN SODIUM AND SULBACTAM SODIUM 3 G: 2; 1 INJECTION, POWDER, FOR SOLUTION INTRAMUSCULAR; INTRAVENOUS at 12:09

## 2017-09-24 RX ADMIN — HYDROMORPHONE HYDROCHLORIDE 1 MG: 1 INJECTION, SOLUTION INTRAMUSCULAR; INTRAVENOUS; SUBCUTANEOUS at 01:09

## 2017-09-24 RX ADMIN — CALCITRIOL 0.25 MCG: 0.25 CAPSULE, LIQUID FILLED ORAL at 08:09

## 2017-09-24 RX ADMIN — CALCIUM CARBONATE 1000 MG: 1250 SUSPENSION ORAL at 02:09

## 2017-09-24 RX ADMIN — ENOXAPARIN SODIUM 40 MG: 100 INJECTION SUBCUTANEOUS at 05:09

## 2017-09-24 RX ADMIN — INSULIN ASPART 8 UNITS: 100 INJECTION, SOLUTION INTRAVENOUS; SUBCUTANEOUS at 08:09

## 2017-09-24 RX ADMIN — CALCIUM CARBONATE 1000 MG: 1250 SUSPENSION ORAL at 05:09

## 2017-09-24 RX ADMIN — CALCIUM GLUCONATE 1 G: 94 INJECTION, SOLUTION INTRAVENOUS at 11:09

## 2017-09-24 RX ADMIN — PANTOPRAZOLE SODIUM 40 MG: 40 INJECTION, POWDER, FOR SOLUTION INTRAVENOUS at 08:09

## 2017-09-24 RX ADMIN — CALCIUM CARBONATE 1000 MG: 1250 SUSPENSION ORAL at 11:09

## 2017-09-24 NOTE — ASSESSMENT & PLAN NOTE
Agree with weight based dose LT4 55mcg IV daily. Upon discharge can change to equivalent PO dose 100mcg per PEG (dose to be given 1hr apart from TF). Please check FT4 in 4 weeks (~10/22)

## 2017-09-24 NOTE — PLAN OF CARE
Problem: Patient Care Overview  Goal: Plan of Care Review  AAOx4. VSS throughout shift. Insulin drip initiated and titrated according to protocol. Martines removed. Pt updated on POC throughout shift. Will continue to monitor very closely. See flowsheet for full assessments.

## 2017-09-24 NOTE — PT/OT/SLP EVAL
"Speech Language Pathology Evaluation    Luís Esteban   MRN: [id number]   Admitting Diagnosis: s/p laryngectomy        SLP Treatment Date: 09/24/17  Speech Start Time: 0927     Speech Stop Time: 0939     Speech Total (min): 12 min       TREATMENT BILLABLE MINUTES:  Evaluation Use/Fit Voiced Prosthetic 12    Diagnosis: s/p laryngectomy    Past Medical History:   Diagnosis Date    Insulin dependent diabetes mellitus     Kidney failure     Laryngeal cancer     Tracheostomy dependence      Past Surgical History:   Procedure Laterality Date    DIRECT LARYNGOBRONCHOSCOPY  08/31/2017       Has the patient been evaluated by SLP for swallowing? : Yes  Keep patient NPO?: Yes   General Precautions: Standard, aspiration, fall    Current Respiratory Status: laryngectomy (neck breather)     Social Hx: Patient lives with family in [place], [place]    Occupational/hobbies/homemaking: Per Evonne Still's note from Pre-laryngectomy counseling:  He is on disability due to kidney failure.  His sister reported that their father is a forman and Mr. Esteban works with him "when he is willing."  Primarily, he spends his time playing video games and watching TV.    Subjective:  Awake/alert  Patient goals: "to drink something."    Pain/Comfort  Pain Rating 1: 0/10  Pain Rating Post-Intervention 1: 0/10     Objective:        Oral Musculature Evaluation  Oral Musculature: WFL  Dentition: present and adequate  Mandibular Strength and Mobility: WFL  Oral Labial Strength and Mobility: WFL  Lingual Strength and Mobility: WFL     Laryngectomy Evaluation    Date of surgery:9/22/17  Completed pre-op counseling as OP:  Yes  Has TEP : No  Has PEG: Yes  Equipment present in stoma:  trach tube and neck ties  Laryngectomy 'kit' supplied to room and explained in detail: [employer] pulmonary kit (aleksandra tube, aleksandra brushes, HMEs, white board, informational packet, shower guard), AL, medical bracelet, and [employer] consent form.   AL: assembled and explained in " detail, family/pt education provided, patient return demonstrated with 50% intelligibility when utilizing AL.  Pt utilizing dry/erase board to communicate wants/needs with staff and family  Kit labeled: yes  RN notified of kit in room: yes        Assessment:  Luís Esteban is a 47 y.o. male presenting for post laryngectomy care     Discharge recommendations:   continued ST post d/c    Goals:    SLP Goals        Problem: SLP Goal    Goal Priority Disciplines Outcome   SLP Goal     SLP    Description:  Speech Language Pathology Goals  Goals expected to be met by 10/1    1. Pt/family will actively participate in post-laryngectomy education to facilitate Smith and desensitization  2. Pt will communicate contextualized phrases via AL with approximately 60% intelligibility provided min cues to improve communication.  3. Pt/family will perform stoma care x1 per session provided min cues to facilitate Smith.                                 Plan:   Patient to be seen Therapy Frequency: 5 x/week   Plan of Care expires:    Plan of Care reviewed with: patient  SLP Follow-up?: Yes              Gricel Breen CCC-SLP   Speech Language Pathologist  Pager [phone]  09/24/2017

## 2017-09-24 NOTE — ASSESSMENT & PLAN NOTE
BG goal 140-180  BG >400 while NPO and before starting TF, will start intensive insulin gtt w q1hr monitoring.     Dispo: MDI will depend on TF bolus regimen.

## 2017-09-24 NOTE — ANESTHESIA POSTPROCEDURE EVALUATION
"Anesthesia Post Evaluation    Patient: Luís Esteban    Procedure(s) Performed: Procedure(s) (LRB):  DISSECTION-NECK (Right)  LARYNGOSCOPY (N/A)  FLAP-ROTATION (N/A)  LARYNGECTOMY (N/A)  DISSECTION-NECK (Left)    Final Anesthesia Type: general  Patient location during evaluation: ICU  Patient participation: Yes- Able to Participate  Level of consciousness: awake and alert  Post-procedure vital signs: reviewed and stable  Pain management: adequate  Airway patency: patent  PONV status at discharge: No PONV  Anesthetic complications: no      Cardiovascular status: blood pressure returned to baseline and stable  Respiratory status: unassisted  Hydration status: euvolemic  Follow-up not needed.        Visit Vitals  BP (!) 144/71 (BP Location: Right arm, Patient Position: Lying)   Pulse 76   Temp 36.9 °C (98.5 °F) (Oral)   Resp 17   Ht 5' 11" (1.803 m)   Wt 60 kg (132 lb 4.4 oz)   SpO2 100%   BMI 18.45 kg/m²       Pain/Wade Score: Pain Assessment Performed: Yes (9/24/2017  7:00 AM)  Presence of Pain: complains of pain/discomfort (9/24/2017  7:00 AM)  Pain Rating Prior to Med Admin: 8 (9/24/2017  1:29 AM)      "

## 2017-09-24 NOTE — PT/OT/SLP PROGRESS
Occupational Therapy      Luís Esteban  MRN: [id number]    Patient not seen today secondary to patient declining therapy this visit and is gesturing to therapist and nurse to leave the room   . Will follow-up as appropriate     Barry Fried OT  9/24/2017

## 2017-09-24 NOTE — PROGRESS NOTES
[facility]  Otorhinolaryngology-Head & Neck Surgery  Progress Note    Subjective:     Post-Op Info:  Procedure(s) (LRB):  DISSECTION-NECK (Right)  LARYNGOSCOPY (N/A)  FLAP-ROTATION (N/A)  LARYNGECTOMY (N/A)  DISSECTION-NECK (Left)   2 Days Post-Op  Hospital Day: 3     Interval History: NAEON. Doing well this morning, no significant pain. Breathing well. Per nursing, refusing to get out of bed, refusing most nursing services.    Medications:  Continuous Infusions:   insulin (HUMAN R) infusion (adults) 1.5 Units/hr (09/24/17 1005)     Scheduled Meds:   ampicillin-sulbactim (UNASYN) IVPB  3 g Intravenous Q6H    calcitRIOL  0.25 mcg Per G Tube Daily    calcium carbonate  1,000 mg Per G Tube TID    enoxaparin  40 mg Subcutaneous Daily    levothyroxine  55 mcg Intravenous Daily    pantoprazole  40 mg Intravenous Daily    [START ON 9/25/2017] pantoprazole  40 mg Per NG tube Daily     PRN Meds:calcium gluconate IVPB, calcium gluconate IVPB, calcium gluconate IVPB, dextrose 50%, dextrose 50%, diphenhydrAMINE, glucagon (human recombinant), hydrALAZINE, HYDROmorphone, HYDROmorphone, magnesium sulfate IVPB, magnesium sulfate IVPB, ondansetron, potassium chloride **AND** potassium chloride **AND** potassium chloride, promethazine (PHENERGAN) IVPB     Review of patient's allergies indicates:  No Known Allergies  Objective:     Vital Signs (24h Range):  Temp:  [97.8 °F (36.6 °C)-98.5 °F (36.9 °C)] 98.3 °F (36.8 °C)  Pulse:  [71-92] 77  Resp:  [16-30] 16  SpO2:  [97 %-100 %] 99 %  BP: (124-159)/(70-86) 127/76  Arterial Line BP: (108-145)/() 145/73       Date 09/24/17 0700 - 09/25/17 0659   Shift 6004-8837 4026-7796 0906-5133 24 Hour Total   I  N  T  A  K  E   Shift Total  (mL/kg)       O  U  T  P  U  T   Urine  (mL/kg/hr) 395   395    Shift Total  (mL/kg) 395  (6.6)   395  (6.6)   Weight (kg) 60 60 60 60     Lines/Drains/Airways     Drain                 Gastrostomy/Enterostomy 08/31/17 1900 23  days         Urethral Catheter 09/22/17 1100 Temperature probe;Non-latex 16 Fr. 2 days         Closed/Suction Drain 09/22/17 1708 Medial Neck Bulb 15 Fr. 1 day         Closed/Suction Drain 09/22/17 1708 Right Neck Bulb 15 Fr. 1 day         Closed/Suction Drain 09/22/17 1709 Left Neck Bulb 15 Fr. 1 day         Closed/Suction Drain 09/22/17 1709 Right Chest Bulb 15 Fr. 1 day         Closed/Suction Drain 09/22/17 1710 Right Chest Bulb 15 Fr. 1 day          Airway                 Surgical Airway 08/31/17 0734 Shiley Cuffed 24 days          Peripheral Intravenous Line                 Peripheral IV - Single Lumen 09/22/17 0835 Left Forearm 2 days         Peripheral IV - Single Lumen 09/22/17 1110 Right Hand 2 days                Physical Exam    NAD, lying in bed  Neck incisions clean, dry, intact, no erythema or edema  Laryngectomy stoma intact, 6.0 cuffed shiley trach in place. Changed to aleksandra tube  Right chest incision with island dressing in place, mildly saturated. No evidence of hematoma or seroma  HUNTER drains in bilateral necks and right chest holding suction with serosanguinous drainage  Right lateral neck: 33 cc  Right medial neck: 32 cc  Left neck: 40 cc  Right chest 1: 45  Right chest 2: 40    Significant Labs:  CBC:     Recent Labs  Lab 09/24/17  0500   WBC 14.73*   RBC 2.79*   HGB 8.7*   HCT 25.8*   *   MCV 93   MCH 31.2*   MCHC 33.7     CMP:     Recent Labs  Lab 09/24/17  0500   *   CALCIUM 8.5*   ALBUMIN 2.1*   PROT 6.4   *   K 4.5   CO2 26   CL 95   BUN 22*   CREATININE 0.8   ALKPHOS 75   ALT 17   AST 23   BILITOT 0.3     PTH:     Recent Labs  Lab 09/23/17  0822   PTH <5.0*     Ionized Calcium: 1.04 --> 1.11    Significant Diagnostics:  None    Assessment/Plan:     Laryngeal cancer    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     ENT:  -keep JPs  -no flap checks   -bacitracin to  wounds  -aleksandra tube in stoma  -advance to day 2 of the pathway     Neuro:  - analgesia prn  -anti-emetics prn     Resp:  -humidified 02 via trach collar     CV:  - HDS     Heme/ID:  - Unasyn empiric post op coverage      Renal:  - Martines in place; d/c today  - Strict I/Os     FEN/GI:  - strict NPO  -will start TF  via G tube per pathway (POD 2)              - Diabetasource AC goal of 50 cc/hr  - Replace lytes PRN     Endo:  -postop PTH low, start rocaltrol, calcium carbonate  -q8h ionized ca - normal  -synthroid 55 mcg IV started  -glucose up to 400s last night, started on insulin gtt     PPX:  -Pt/OT; OOB  - protonix  - Lovenox      Dispo:  - Step down to PCU            Bouchra Chavarria MD  Otorhinolaryngology-Head & Neck Surgery  [facility]

## 2017-09-24 NOTE — ASSESSMENT & PLAN NOTE
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     ENT:  -keep JPs  -no flap checks   -bacitracin to wounds  -aleksandra tube in stoma  -advance to day 2 of the pathway     Neuro:  - analgesia prn  -anti-emetics prn     Resp:  -humidified 02 via trach collar     CV:  - HDS     Heme/ID:  - Unasyn empiric post op coverage      Renal:  - Martines in place; d/c today  - Strict I/Os     FEN/GI:  - strict NPO  -will start TF  via G tube per pathway (POD 2)              -Diabetasource AC goal of 50 cc/hr  - Replace lytes PRN     Endo:  -postop PTH low, start rocaltrol, calcium carbonate  -q8h ionized ca - normal  -synthroid 55 mcg IV started  -glucose up to 400s last night, started on insulin gtt     PPX:  -Pt/OT; OOB  - protonix  - Lovenox      Dispo:  - Step down to PCU

## 2017-09-24 NOTE — ASSESSMENT & PLAN NOTE
Goal calcium low limit normal without symptoms   Calcium stable, agree with calcitriol 0.25mcg daily, tums 1g tid, wean as tolerated   Monitor Ica, renal panel daily

## 2017-09-24 NOTE — PLAN OF CARE
Problem: Patient Care Overview  Goal: Plan of Care Review  Outcome: Ongoing (interventions implemented as appropriate)  All VSS. No significant events occurred throughout the shift. Patient on 8 L, 35% Trach collar with no distress noted. Patient asks for trach suction occasionally. HUNTER drains put out small amount of serosangeinous fluid. See chart for exact numbers. Neck incision intact and moist. Patient blood glucose has been greater than 300 for both accuchecks this shift. Supplemental insulin given. May need insulin gtt.  Patient refuses a bath at this time. Patient aware of the plan of care. Plan for today is to wean oxygen as tolerated. Will continue to monitor

## 2017-09-24 NOTE — NURSING
Pt refused to get out of bed or participate in PT/OT. It was explained to him that part of his recovery is getting up to chair, etc.

## 2017-09-24 NOTE — HPI
Reason for Consult: Management of T1DM     Surgical Procedure and Date: PEG/trach 8/30/17; total laryngectomy, bilateral neck dissection (II-IV, VI), total thyroidectomy 9/22/17     Diabetes diagnosis year: 2009     Home Diabetes Medications: Lantus 20 units qhs; Novolog 4 units for TF boluses (Diabetasource 5x/day)  Novolog 6 units AC; Novolog 10 units for TF bolus + meal      How often checking glucose at home? 1-3 x day       Diabetes Complications include: Hyperglycemia and Diabetic peripheral neuropathy      Complicating diabetes co morbidities: Active Cancer    HPI: Patient is a 47 y.o. male with a diagnosis of uncontrolled T1DM, s/p laryngectomy for SCC of larynx on 9/22, postsurgical hypothyroidism, S/p trach and peg, on enteral nutrition and diet as noted in above regimen at home. Endo consulted for post-op hyperglycemia.

## 2017-09-24 NOTE — CONSULTS
[facility]  Endocrinology  Diabetes Consult Note    Consult Requested by: Aracely Rose MD   Reason for admit: <principal problem not specified>    HISTORY OF PRESENT ILLNESS:  Reason for Consult: Management of T2DM, Hyperglycemia      Surgical Procedure and Date:   PEG/trach 8/30/17  total laryngectomy, bilateral neck dissection (II-IV, VI), total thyroidectomy 9/22/17     Diabetes diagnosis year: 2009     Home Diabetes Medications:    lantus 16 units nightly  novolog 4 units for TF boluses (Diabetisource 5x/day)  novolog 6 units for meals   novolog 10 units for TF bolus + meal      How often checking glucose at home? 1-3 x day   BG readings on regimen: ?  Hypoglycemia on the regimen? ?  Missed doses on regimen?  ?     Diabetes Complications include:     Hyperglycemia and Diabetic peripheral neuropathy      Complicating diabetes co morbidities:   Active Cancer        HPI:   Patient is a 47 y.o. male with a diagnosis of uncontrolled T2DM, s/p laryngectomy for SCC of larynx on 9/22, postsurgical hypothyroidism, S/p trach and peg, on enteral nutrition and diet as noted in above regimen. Endo consulted for post-op hyperglycemia.         PMH, PSH, FH, SH updated and reviewed     Review of Systems    REVIEW OF SYSTEMS  Limited, communicates in writing        PHYSICAL EXAMINATION:  Vitals:    09/24/17 0900   BP: 128/70   Pulse: 88   Resp: 16   Temp:      Body mass index is 18.45 kg/m².    Physical Exam   PHYSICAL EXAMINATION  Constitutional:  Well developed, cachectic, NAD.  ENT: External ears no masses with nose patent; normal hearing.   Neck:  Trach stoma, surgical incision, +right hematoma    Cardiovascular: Normal heart sounds, no LE edema. Muscle wasting   Lungs:  Normal effort; lungs anterior bilaterally clear to auscultation.  Abdomen:  Soft, no masses,  no hernias. PEG in place   MS: No clubbing or cyanosis of nails noted; unable to assess gait.  Skin: No rashes, lesions, or ulcers; no  nodules.  Psychiatric: difficult to assess, flat affect   Neurological: Cranial nerves are grossly intact.       Labs Reviewed and Include     Recent Labs  Lab 09/24/17  0500   *   CALCIUM 8.5*   ALBUMIN 2.1*   PROT 6.4   *   K 4.5   CO2 26   CL 95   BUN 22*   CREATININE 0.8   ALKPHOS 75   ALT 17   AST 23   BILITOT 0.3     Lab Results   Component Value Date    WBC 14.73 (H) 09/24/2017    HGB 8.7 (L) 09/24/2017    HCT 25.8 (L) 09/24/2017    MCV 93 09/24/2017     (H) 09/24/2017     No results for input(s): TSH, FREET4 in the last 168 hours.  Lab Results   Component Value Date    HGBA1C 9.6 (H) 09/03/2017       Nutritional status:   Body mass index is 18.45 kg/m².  Lab Results   Component Value Date    ALBUMIN 2.1 (L) 09/24/2017    ALBUMIN 2.2 (L) 09/22/2017    ALBUMIN 2.0 (L) 09/04/2017     No results found for: PREALBUMIN    Estimated Creatinine Clearance: 95.8 mL/min (based on SCr of 0.8 mg/dL).    Accu-Checks  Recent Labs      09/22/17   0845  09/22/17   0953  09/22/17   1848  09/22/17   2009  09/22/17   2200  09/23/17   1024  09/23/17   1724  09/24/17   0017   POCTGLUCOSE  440*  342*  147*  200*  274*  269*  239*  317*        ASSESSMENT and PLAN    DM (diabetes mellitus), type 2, uncontrolled    BG goal 140-180  BG >400 while NPO and before starting TF, will start intensive insulin gtt w q1hr monitoring.     Dispo: MDI will depend on TF bolus regimen.           Post-surgical hypoparathyroidism    Goal calcium low limit normal without symptoms   Calcium stable, agree with calcitriol 0.25mcg daily, tums 1g tid, wean as tolerated   Monitor Ica, renal panel daily            Hypothyroidism, postsurgical    Agree with weight based dose LT4 55mcg IV daily. Upon discharge can change to equivalent PO dose 100mcg per PEG (dose to be given 1hr apart from TF). Please check FT4 in 4 weeks (~10/22)        Laryngeal cancer    Per ENT           On enteral nutrition    May increase insulin needs                 Plan discussed with patient, family, and RN at bedside.     Tiana Dukes MD  Endocrinology  [facility], Melva Buckner MD,  have personally taken the history and examined the patient and agree with the resident's note as stated above.

## 2017-09-24 NOTE — OP NOTE
DATE OF PROCEDURE:  09/22/2017    SURGEONS:  1.  Aracely Rose M.D.  2.  Wiliam Meeks M.D. (RES)    PROCEDURES PERFORMED:  1.  Direct laryngoscopy.  2.  Right neck dissection.    INDICATIONS FOR PROCEDURE:  The patient is a 47-year-old male with history of   endolaryngeal squamous cell carcinoma.  Given the extent of his disease, it was   thought best to take him to surgery by Dr. Deleon and myself for resection and   possible free flap.  I was asked to help with the resection portion of the   surgery.  Risks, benefits and alternatives were explained to the patient, and   despite these risks, he wished to proceed with surgery.    DESCRIPTION OF PROCEDURE:  The patient was taken to the Operating Room and   placed under general endotracheal anesthesia through his previous tracheal site.    The head of the bed was turned 180 degrees.  A head drape was placed and we   proceeded with direct laryngoscopy.  Again, was seen an endolaryngeal involving   both sides of the larynx all above the previous tracheostomy site.  At this   point, we proceeded with the remainder of the surgery.  The patient was then   prepped and draped in a sterile fashion.  Upon further inspection, the neck skin   did not appear to be adherent to the underlying soft tissues, so I thought best   to start with raising the total laryngectomy skin flaps bilaterally.  These   were able to be  from the underlying soft tissues.  Once the superior   and inferior free flaps were raised, they were secured back with fishhook   retractors.  Then turned our attention to the patient's right neck.  The   posterior belly of the digastric was identified and followed posteriorly.  The   internal jugular vein was identified and then opened up the contents medial to   the sternocleidomastoid muscle using the Bovie cautery.  Spinal accessory nerve   was identified and traced superiorly.  It was found to be coursing behind the   internal jugular vein.  Once  the nerve was dissected appropriately, the contents   of level 2B were then taken down and sent for permanent pathology.  We then   directed our attention to the rest of the right neck contents following the   cervical nerve rootlets medially to the carotid sheath, which was opened and the   lymph node packet was then retracted medially down past the omohyoid, which was   transected as well as the sternal head of the sternocleidomastoid muscle level   4 contents were then dissected and retracted medially with care to tie and clamp   attachments in this area to avoid future chyle leak.  Once the lymph node   packet was released laterally, this was then brought medially and taken down off   of the internal jugular vein using a #15 blade.  Once the lymph node packet was   removed, it was sectioned and sent for permanent pathology.  At this point, it   was determined that an anterior lateral thigh free flap would not be needed for   the surgery and Dr. Deleon then took over with the rest of the surgery.  Please   see his dictated operative report for further details.    ANESTHESIA:  General.    SPECIMENS SENT FOR PATHOLOGY:  During my portion, right neck dissection levels   2A, 2B, 3 and 4 for permanent pathology.    COMPLICATIONS:  None.    I was present and participated in the entire of this portion of the procedure.      MSC/HN  dd: 09/24/2017 12:13:38 (CDT)  td: 09/24/2017 13:06:38 (CDT)  Doc ID   #[id number]  Job ID #[id number]    CC:

## 2017-09-24 NOTE — SUBJECTIVE & OBJECTIVE
PMH, PSH, FH, SH updated and reviewed     Review of Systems    REVIEW OF SYSTEMS  Limited, communicates in writing        PHYSICAL EXAMINATION:  Vitals:    09/24/17 0900   BP: 128/70   Pulse: 88   Resp: 16   Temp:      Body mass index is 18.45 kg/m².    Physical Exam   PHYSICAL EXAMINATION  Constitutional:  Well developed, cachectic, NAD.  ENT: External ears no masses with nose patent; normal hearing.   Neck:  Trach stoma, surgical incision, +right hematoma    Cardiovascular: Normal heart sounds, no LE edema. Muscle wasting   Lungs:  Normal effort; lungs anterior bilaterally clear to auscultation.  Abdomen:  Soft, no masses,  no hernias. PEG in place   MS: No clubbing or cyanosis of nails noted; unable to assess gait.  Skin: No rashes, lesions, or ulcers; no nodules.  Psychiatric: difficult to assess, flat affect   Neurological: Cranial nerves are grossly intact.

## 2017-09-24 NOTE — SUBJECTIVE & OBJECTIVE
Interval History: NAEON. Doing well this morning, no significant pain. Breathing well. Per nursing, refusing to get out of bed, refusing most nursing services.    Medications:  Continuous Infusions:   insulin (HUMAN R) infusion (adults) 1.5 Units/hr (09/24/17 1005)     Scheduled Meds:   ampicillin-sulbactim (UNASYN) IVPB  3 g Intravenous Q6H    calcitRIOL  0.25 mcg Per G Tube Daily    calcium carbonate  1,000 mg Per G Tube TID    enoxaparin  40 mg Subcutaneous Daily    levothyroxine  55 mcg Intravenous Daily    pantoprazole  40 mg Intravenous Daily    [START ON 9/25/2017] pantoprazole  40 mg Per NG tube Daily     PRN Meds:calcium gluconate IVPB, calcium gluconate IVPB, calcium gluconate IVPB, dextrose 50%, dextrose 50%, diphenhydrAMINE, glucagon (human recombinant), hydrALAZINE, HYDROmorphone, HYDROmorphone, magnesium sulfate IVPB, magnesium sulfate IVPB, ondansetron, potassium chloride **AND** potassium chloride **AND** potassium chloride, promethazine (PHENERGAN) IVPB     Review of patient's allergies indicates:  No Known Allergies  Objective:     Vital Signs (24h Range):  Temp:  [97.8 °F (36.6 °C)-98.5 °F (36.9 °C)] 98.3 °F (36.8 °C)  Pulse:  [71-92] 77  Resp:  [16-30] 16  SpO2:  [97 %-100 %] 99 %  BP: (124-159)/(70-86) 127/76  Arterial Line BP: (108-145)/() 145/73       Date 09/24/17 0700 - 09/25/17 0659   Shift 1827-0098 8810-1901 0813-4068 24 Hour Total   I  N  T  A  K  E   Shift Total  (mL/kg)       O  U  T  P  U  T   Urine  (mL/kg/hr) 395   395    Shift Total  (mL/kg) 395  (6.6)   395  (6.6)   Weight (kg) 60 60 60 60     Lines/Drains/Airways     Drain                 Gastrostomy/Enterostomy 08/31/17 1900 23 days         Urethral Catheter 09/22/17 1100 Temperature probe;Non-latex 16 Fr. 2 days         Closed/Suction Drain 09/22/17 1708 Medial Neck Bulb 15 Fr. 1 day         Closed/Suction Drain 09/22/17 1708 Right Neck Bulb 15 Fr. 1 day         Closed/Suction Drain 09/22/17 1709 Left Neck Bulb 15  Fr. 1 day         Closed/Suction Drain 09/22/17 1709 Right Chest Bulb 15 Fr. 1 day         Closed/Suction Drain 09/22/17 1710 Right Chest Bulb 15 Fr. 1 day          Airway                 Surgical Airway 08/31/17 0734 Shiley Cuffed 24 days          Peripheral Intravenous Line                 Peripheral IV - Single Lumen 09/22/17 0835 Left Forearm 2 days         Peripheral IV - Single Lumen 09/22/17 1110 Right Hand 2 days                Physical Exam    NAD, lying in bed  Neck incisions clean, dry, intact, no erythema or edema  Laryngectomy stoma intact, 6.0 cuffed shiley trach in place. Changed to aleksandra tube  Right chest incision with island dressing in place, mildly saturated. No evidence of hematoma or seroma  HUNTER drains in bilateral necks and right chest holding suction with serosanguinous drainage  Right lateral neck: 33 cc  Right medial neck: 32 cc  Left neck: 40 cc  Right chest 1: 45  Right chest 2: 40    Significant Labs:  CBC:     Recent Labs  Lab 09/24/17  0500   WBC 14.73*   RBC 2.79*   HGB 8.7*   HCT 25.8*   *   MCV 93   MCH 31.2*   MCHC 33.7     CMP:     Recent Labs  Lab 09/24/17  0500   *   CALCIUM 8.5*   ALBUMIN 2.1*   PROT 6.4   *   K 4.5   CO2 26   CL 95   BUN 22*   CREATININE 0.8   ALKPHOS 75   ALT 17   AST 23   BILITOT 0.3     PTH:     Recent Labs  Lab 09/23/17  0822   PTH <5.0*     Ionized Calcium: 1.04 --> 1.11    Significant Diagnostics:  None

## 2017-09-24 NOTE — PROGRESS NOTES
Progress Note  Surgical Intensive Care      Admit Date: 9/22/2017  Post-operative Day: 2 Days Post-Op  Hospital Day: 3    Patient is a 47 y.o. male with Hx of laryngeal cancer, Dm2, and gtube placement POD 2 from laryngectomy, bilateral neck dissection and pec flap .           INTERVAL HISTORY:   NAEON. Patient on trach collar. Sitting up in bed      Continuous Infusions:   insulin (HUMAN R) infusion (adults)       Scheduled Meds:   ampicillin-sulbactim (UNASYN) IVPB  3 g Intravenous Q6H    calcitRIOL  0.25 mcg Per G Tube Daily    calcium carbonate  1,000 mg Per G Tube TID    enoxaparin  40 mg Subcutaneous Daily    levothyroxine  55 mcg Intravenous Daily    pantoprazole  40 mg Intravenous Daily    [START ON 9/25/2017] pantoprazole  40 mg Per NG tube Daily     PRN Meds:calcium gluconate IVPB, calcium gluconate IVPB, calcium gluconate IVPB, dextrose 50%, dextrose 50%, diphenhydrAMINE, glucagon (human recombinant), hydrALAZINE, HYDROmorphone, HYDROmorphone, magnesium sulfate IVPB, magnesium sulfate IVPB, ondansetron, potassium chloride **AND** potassium chloride **AND** potassium chloride, promethazine (PHENERGAN) IVPB    Review of patient's allergies indicates:  No Known Allergies    OBJECTIVE:     Vital Signs (Most Recent)  Temp: 98.5 °F (36.9 °C) (09/24/17 0700)  Pulse: 85 (09/24/17 1000)  Resp: 17 (09/24/17 1000)  BP: 128/70 (09/24/17 0900)  SpO2: 97 % (09/24/17 1000)    Vital Signs Range (Last 24H):  Temp:  [97.8 °F (36.6 °C)-98.5 °F (36.9 °C)]   Pulse:  [71-92]   Resp:  [16-30]   BP: (127-159)/(70-86)   SpO2:  [97 %-100 %]   Arterial Line BP: (108-145)/()     I & O (Last 24H):  Intake/Output Summary (Last 24 hours) at 09/24/17 1004  Last data filed at 09/24/17 0900   Gross per 24 hour   Intake           360.16 ml   Output             1415 ml   Net         -1054.84 ml     Ventilator Data (Last 24H):     Oxygen Concentration (%):  [35-60] 60    Hemodynamic Parameters (Last 24H):       Physical  Exam:  General: NAD  Neuro: AAOx4  HEENT: neck supple. Trach collar in place  Cardio: S1 and S2, RRR  Resp: Moving air appropriately, breathing even and unlabored  Abd: Soft, NT, ND  Ext: Warm and well perfused        Laboratory (Last 24H):  CBC:   Recent Labs  Lab 09/24/17  0500   WBC 14.73*   RBC 2.79*   HGB 8.7*   HCT 25.8*   *   MCV 93   MCH 31.2*   MCHC 33.7       CMP:   Recent Labs  Lab 09/24/17  0500   *   CALCIUM 8.5*   ALBUMIN 2.1*   PROT 6.4   *   K 4.5   CO2 26   CL 95   BUN 22*   CREATININE 0.8   ALKPHOS 75   ALT 17   AST 23   BILITOT 0.3       ABGs:   Recent Labs  Lab 09/22/17  2259   PH 7.371   PCO2 43.0   PO2 181*   HCO3 24.9   POCSATURATED 100   BE 0         Chest X-Ray:      ASSESSMENT/PLAN:       Neuro:   -A&Ox3     Pulmonary:   -Satting well on trach collar  -Pt with laryngeal stoma     Cardiac:  -HDS off pressors       Renal:   -BUN/Cr wnl  -UOP stable  -Continue to monitor     Infectious Disease:   -Afebrile  -Monitor for s/s of infection  -Unasyn empiric post op coverage     Hematology/Oncology:  --Trend post op H/h  -Transfuse PRN  -Monitor for signs of blood loss      Endocrine:  -ISS PRN  -Accuchecks  -May require insulin gtt     Fluids/Electrolytes/Nutrition/GI:   -Replete lytes PRN  -NPO for now   -PEG tube to gravity     Pain Management:   -Dilaudid IV PRN  -Transition to PO once able to take PO     Dispo: Continue ICU care    Roman Patino MD  SICU Resident PGY III  9/24/2017 10:04 AM

## 2017-09-25 PROBLEM — F12.90 MARIJUANA USE, CONTINUOUS: Status: ACTIVE | Noted: 2017-09-25

## 2017-09-25 PROBLEM — J15.1 PNEUMONIA DUE TO PSEUDOMONAS SPECIES: Status: RESOLVED | Noted: 2017-09-05 | Resolved: 2017-09-25

## 2017-09-25 PROBLEM — C32.9 SQUAMOUS CELL CARCINOMA OF LARYNX: Status: RESOLVED | Noted: 2017-09-05 | Resolved: 2017-09-25

## 2017-09-25 LAB
ALBUMIN SERPL BCP-MCNC: 2 G/DL
ALP SERPL-CCNC: 69 U/L
ALT SERPL W/O P-5'-P-CCNC: 15 U/L
ANION GAP SERPL CALC-SCNC: 10 MMOL/L
AST SERPL-CCNC: 19 U/L
BASOPHILS # BLD AUTO: 0.01 K/UL
BASOPHILS NFR BLD: 0.1 %
BILIRUB SERPL-MCNC: 0.3 MG/DL
BUN SERPL-MCNC: 18 MG/DL
CA-I BLDV-SCNC: 1.06 MMOL/L
CALCIUM SERPL-MCNC: 8.5 MG/DL
CHLORIDE SERPL-SCNC: 99 MMOL/L
CO2 SERPL-SCNC: 31 MMOL/L
CREAT SERPL-MCNC: 0.7 MG/DL
DIFFERENTIAL METHOD: ABNORMAL
EOSINOPHIL # BLD AUTO: 0.1 K/UL
EOSINOPHIL NFR BLD: 0.4 %
ERYTHROCYTE [DISTWIDTH] IN BLOOD BY AUTOMATED COUNT: 16.8 %
EST. GFR  (AFRICAN AMERICAN): >60 ML/MIN/1.73 M^2
EST. GFR  (NON AFRICAN AMERICAN): >60 ML/MIN/1.73 M^2
GLUCOSE SERPL-MCNC: 125 MG/DL
HCT VFR BLD AUTO: 26.1 %
HGB BLD-MCNC: 8.8 G/DL
LYMPHOCYTES # BLD AUTO: 1 K/UL
LYMPHOCYTES NFR BLD: 7.7 %
MCH RBC QN AUTO: 31 PG
MCHC RBC AUTO-ENTMCNC: 33.7 G/DL
MCV RBC AUTO: 92 FL
MONOCYTES # BLD AUTO: 0.7 K/UL
MONOCYTES NFR BLD: 5.2 %
NEUTROPHILS # BLD AUTO: 11.4 K/UL
NEUTROPHILS NFR BLD: 86.2 %
PLATELET # BLD AUTO: 387 K/UL
PMV BLD AUTO: 8.9 FL
POCT GLUCOSE: 105 MG/DL (ref 70–110)
POCT GLUCOSE: 115 MG/DL (ref 70–110)
POCT GLUCOSE: 126 MG/DL (ref 70–110)
POCT GLUCOSE: 126 MG/DL (ref 70–110)
POCT GLUCOSE: 133 MG/DL (ref 70–110)
POCT GLUCOSE: 142 MG/DL (ref 70–110)
POCT GLUCOSE: 148 MG/DL (ref 70–110)
POCT GLUCOSE: 152 MG/DL (ref 70–110)
POCT GLUCOSE: 154 MG/DL (ref 70–110)
POCT GLUCOSE: 172 MG/DL (ref 70–110)
POCT GLUCOSE: 174 MG/DL (ref 70–110)
POCT GLUCOSE: 176 MG/DL (ref 70–110)
POCT GLUCOSE: 180 MG/DL (ref 70–110)
POCT GLUCOSE: 189 MG/DL (ref 70–110)
POCT GLUCOSE: 232 MG/DL (ref 70–110)
POCT GLUCOSE: 249 MG/DL (ref 70–110)
POCT GLUCOSE: 251 MG/DL (ref 70–110)
POCT GLUCOSE: 308 MG/DL (ref 70–110)
POCT GLUCOSE: 407 MG/DL (ref 70–110)
POCT GLUCOSE: 442 MG/DL (ref 70–110)
POCT GLUCOSE: 84 MG/DL (ref 70–110)
POCT GLUCOSE: 96 MG/DL (ref 70–110)
POTASSIUM SERPL-SCNC: 3.8 MMOL/L
PROT SERPL-MCNC: 6.3 G/DL
RBC # BLD AUTO: 2.84 M/UL
SODIUM SERPL-SCNC: 140 MMOL/L
WBC # BLD AUTO: 13.16 K/UL

## 2017-09-25 PROCEDURE — 25000003 PHARM REV CODE 250

## 2017-09-25 PROCEDURE — 97161 PT EVAL LOW COMPLEX 20 MIN: CPT

## 2017-09-25 PROCEDURE — 63600175 PHARM REV CODE 636 W HCPCS: Performed by: NURSE PRACTITIONER

## 2017-09-25 PROCEDURE — 25000003 PHARM REV CODE 250: Performed by: ANESTHESIOLOGY

## 2017-09-25 PROCEDURE — 80053 COMPREHEN METABOLIC PANEL: CPT

## 2017-09-25 PROCEDURE — 63600175 PHARM REV CODE 636 W HCPCS

## 2017-09-25 PROCEDURE — 97165 OT EVAL LOW COMPLEX 30 MIN: CPT

## 2017-09-25 PROCEDURE — 27000221 HC OXYGEN, UP TO 24 HOURS

## 2017-09-25 PROCEDURE — 92609 USE OF SPEECH DEVICE SERVICE: CPT

## 2017-09-25 PROCEDURE — 63600175 PHARM REV CODE 636 W HCPCS: Performed by: ANESTHESIOLOGY

## 2017-09-25 PROCEDURE — 99231 SBSQ HOSP IP/OBS SF/LOW 25: CPT | Mod: GC,,, | Performed by: ANESTHESIOLOGY

## 2017-09-25 PROCEDURE — 94760 N-INVAS EAR/PLS OXIMETRY 1: CPT

## 2017-09-25 PROCEDURE — 85025 COMPLETE CBC W/AUTO DIFF WBC: CPT

## 2017-09-25 PROCEDURE — 25000003 PHARM REV CODE 250: Performed by: OTOLARYNGOLOGY

## 2017-09-25 PROCEDURE — 20600001 HC STEP DOWN PRIVATE ROOM

## 2017-09-25 PROCEDURE — 99232 SBSQ HOSP IP/OBS MODERATE 35: CPT | Mod: ,,, | Performed by: NURSE PRACTITIONER

## 2017-09-25 PROCEDURE — C9113 INJ PANTOPRAZOLE SODIUM, VIA: HCPCS

## 2017-09-25 PROCEDURE — 90791 PSYCH DIAGNOSTIC EVALUATION: CPT | Mod: S$PBB,,, | Performed by: PSYCHOLOGIST

## 2017-09-25 PROCEDURE — 97535 SELF CARE MNGMENT TRAINING: CPT

## 2017-09-25 PROCEDURE — 99900026 HC AIRWAY MAINTENANCE (STAT)

## 2017-09-25 PROCEDURE — 63600175 PHARM REV CODE 636 W HCPCS: Performed by: OTOLARYNGOLOGY

## 2017-09-25 PROCEDURE — 25000003 PHARM REV CODE 250: Performed by: STUDENT IN AN ORGANIZED HEALTH CARE EDUCATION/TRAINING PROGRAM

## 2017-09-25 RX ORDER — LEVOTHYROXINE SODIUM 112 UG/1
112 TABLET ORAL
Status: DISCONTINUED | OUTPATIENT
Start: 2017-09-26 | End: 2017-10-02 | Stop reason: HOSPADM

## 2017-09-25 RX ORDER — HYDROCODONE BITARTRATE AND ACETAMINOPHEN 7.5; 325 MG/15ML; MG/15ML
15 SOLUTION ORAL EVERY 4 HOURS PRN
Status: DISCONTINUED | OUTPATIENT
Start: 2017-09-25 | End: 2017-10-02 | Stop reason: HOSPADM

## 2017-09-25 RX ORDER — SODIUM CHLORIDE, SODIUM LACTATE, POTASSIUM CHLORIDE, CALCIUM CHLORIDE 600; 310; 30; 20 MG/100ML; MG/100ML; MG/100ML; MG/100ML
INJECTION, SOLUTION INTRAVENOUS CONTINUOUS
Status: DISCONTINUED | OUTPATIENT
Start: 2017-09-25 | End: 2017-09-25

## 2017-09-25 RX ORDER — BACITRACIN 500 [USP'U]/G
OINTMENT TOPICAL 3 TIMES DAILY
Status: DISCONTINUED | OUTPATIENT
Start: 2017-09-25 | End: 2017-10-02 | Stop reason: HOSPADM

## 2017-09-25 RX ORDER — GLUCAGON 1 MG
1 KIT INJECTION
Status: DISCONTINUED | OUTPATIENT
Start: 2017-09-25 | End: 2017-09-28

## 2017-09-25 RX ORDER — INSULIN ASPART 100 [IU]/ML
0-5 INJECTION, SOLUTION INTRAVENOUS; SUBCUTANEOUS EVERY 4 HOURS PRN
Status: DISCONTINUED | OUTPATIENT
Start: 2017-09-25 | End: 2017-09-28

## 2017-09-25 RX ORDER — HYDROCODONE BITARTRATE AND ACETAMINOPHEN 7.5; 325 MG/15ML; MG/15ML
15 SOLUTION ORAL EVERY 4 HOURS PRN
Status: DISCONTINUED | OUTPATIENT
Start: 2017-09-25 | End: 2017-09-25

## 2017-09-25 RX ORDER — SODIUM CHLORIDE, SODIUM LACTATE, POTASSIUM CHLORIDE, CALCIUM CHLORIDE 600; 310; 30; 20 MG/100ML; MG/100ML; MG/100ML; MG/100ML
INJECTION, SOLUTION INTRAVENOUS CONTINUOUS
Status: DISCONTINUED | OUTPATIENT
Start: 2017-09-25 | End: 2017-09-29

## 2017-09-25 RX ADMIN — POTASSIUM CHLORIDE 40 MEQ: 10 INJECTION, SOLUTION INTRAVENOUS at 05:09

## 2017-09-25 RX ADMIN — CALCIUM CARBONATE 1000 MG: 1250 SUSPENSION ORAL at 10:09

## 2017-09-25 RX ADMIN — CALCITRIOL 0.25 MCG: 0.25 CAPSULE, LIQUID FILLED ORAL at 08:09

## 2017-09-25 RX ADMIN — CALCIUM GLUCONATE 1 G: 94 INJECTION, SOLUTION INTRAVENOUS at 02:09

## 2017-09-25 RX ADMIN — HYDROMORPHONE HYDROCHLORIDE 1 MG: 1 INJECTION, SOLUTION INTRAMUSCULAR; INTRAVENOUS; SUBCUTANEOUS at 02:09

## 2017-09-25 RX ADMIN — ENOXAPARIN SODIUM 40 MG: 100 INJECTION SUBCUTANEOUS at 05:09

## 2017-09-25 RX ADMIN — HYDROMORPHONE HYDROCHLORIDE 0.5 MG: 1 INJECTION, SOLUTION INTRAMUSCULAR; INTRAVENOUS; SUBCUTANEOUS at 08:09

## 2017-09-25 RX ADMIN — CALCIUM CARBONATE 1000 MG: 1250 SUSPENSION ORAL at 02:09

## 2017-09-25 RX ADMIN — BACITRACIN: 500 OINTMENT TOPICAL at 09:09

## 2017-09-25 RX ADMIN — PANTOPRAZOLE SODIUM 40 MG: 40 GRANULE, DELAYED RELEASE ORAL at 08:09

## 2017-09-25 RX ADMIN — HYDROMORPHONE HYDROCHLORIDE 0.5 MG: 1 INJECTION, SOLUTION INTRAMUSCULAR; INTRAVENOUS; SUBCUTANEOUS at 05:09

## 2017-09-25 RX ADMIN — LEVOTHYROXINE SODIUM ANHYDROUS 55 MCG: 100 INJECTION, POWDER, LYOPHILIZED, FOR SOLUTION INTRAVENOUS at 08:09

## 2017-09-25 RX ADMIN — CALCIUM CARBONATE 1000 MG: 1250 SUSPENSION ORAL at 05:09

## 2017-09-25 RX ADMIN — PANTOPRAZOLE SODIUM 40 MG: 40 INJECTION, POWDER, FOR SOLUTION INTRAVENOUS at 09:09

## 2017-09-25 RX ADMIN — HYDROMORPHONE HYDROCHLORIDE 0.5 MG: 1 INJECTION, SOLUTION INTRAMUSCULAR; INTRAVENOUS; SUBCUTANEOUS at 03:09

## 2017-09-25 RX ADMIN — SODIUM CHLORIDE, SODIUM LACTATE, POTASSIUM CHLORIDE, AND CALCIUM CHLORIDE: 600; 310; 30; 20 INJECTION, SOLUTION INTRAVENOUS at 02:09

## 2017-09-25 RX ADMIN — INSULIN ASPART 3 UNITS: 100 INJECTION, SOLUTION INTRAVENOUS; SUBCUTANEOUS at 12:09

## 2017-09-25 RX ADMIN — HYDROMORPHONE HYDROCHLORIDE 0.5 MG: 1 INJECTION, SOLUTION INTRAMUSCULAR; INTRAVENOUS; SUBCUTANEOUS at 12:09

## 2017-09-25 RX ADMIN — AMPICILLIN SODIUM AND SULBACTAM SODIUM 3 G: 2; 1 INJECTION, POWDER, FOR SOLUTION INTRAMUSCULAR; INTRAVENOUS at 05:09

## 2017-09-25 RX ADMIN — HYDROMORPHONE HYDROCHLORIDE 0.5 MG: 1 INJECTION, SOLUTION INTRAMUSCULAR; INTRAVENOUS; SUBCUTANEOUS at 09:09

## 2017-09-25 NOTE — PLAN OF CARE
Problem: Physical Therapy Goal  Goal: Physical Therapy Goal  Outcome: Ongoing (interventions implemented as appropriate)  PT eval completed.  No PT needs required.  Ankita Johns, PT  9/25/2017

## 2017-09-25 NOTE — PROGRESS NOTES
"[facility]  Endocrinology  Progress Note    Admit Date: 9/22/2017     Reason for Consult: Management of T2DM, Hyperglycemia      Surgical Procedure and Date:   PEG/trach 8/30/17  total laryngectomy, bilateral neck dissection (II-IV, VI), total thyroidectomy 9/22/17     Diabetes diagnosis year: 2009     Home Diabetes Medications:    lantus 16 units nightly  novolog 4 units for TF boluses (Diabetisource 5x/day)  novolog 6 units for meals   novolog 10 units for TF bolus + meal      How often checking glucose at home? 1-3 x day   BG readings on regimen: ?  Hypoglycemia on the regimen? ?  Missed doses on regimen?  ?     Diabetes Complications include:     Hyperglycemia and Diabetic peripheral neuropathy      Complicating diabetes co morbidities:   Active Cancer        HPI:   Patient is a 47 y.o. male with a diagnosis of uncontrolled T2DM, s/p laryngectomy for SCC of larynx on 9/22, postsurgical hypothyroidism, S/p trach and peg, on enteral nutrition and diet as noted in above regimen at home. Endo consulted for post-op hyperglycemia.       Interval HPI:   On Diabetasource AC at 20 ml/hr. NPO. BGs at goal with insulin turned off at 7AM today. Afebrile. No nausea. No hypoglycemic episodes.     /69 (BP Location: Right arm, Patient Position: Sitting)   Pulse 66   Temp 98.5 °F (36.9 °C) (Oral)   Resp (!) 60   Ht 5' 11" (1.803 m)   Wt 60 kg (132 lb 4.4 oz)   SpO2 99%   BMI 18.45 kg/m²      Labs Reviewed and Include      Recent Labs  Lab 09/25/17  0402   *   CALCIUM 8.5*   ALBUMIN 2.0*   PROT 6.3      K 3.8   CO2 31*   CL 99   BUN 18   CREATININE 0.7   ALKPHOS 69   ALT 15   AST 19   BILITOT 0.3     Lab Results   Component Value Date    WBC 13.16 (H) 09/25/2017    HGB 8.8 (L) 09/25/2017    HCT 26.1 (L) 09/25/2017    MCV 92 09/25/2017     (H) 09/25/2017     No results for input(s): TSH, FREET4 in the last 168 hours.  Lab Results   Component Value Date    HGBA1C 9.6 (H) " 09/03/2017       Nutritional status:   Body mass index is 18.45 kg/m².  Lab Results   Component Value Date    ALBUMIN 2.0 (L) 09/25/2017    ALBUMIN 2.1 (L) 09/24/2017    ALBUMIN 2.2 (L) 09/22/2017     No results found for: PREALBUMIN    Estimated Creatinine Clearance: 109.5 mL/min (based on SCr of 0.7 mg/dL).    Accu-Checks  Recent Labs      09/24/17   1420  09/24/17   1547  09/24/17   1652  09/24/17   1826  09/24/17   1930  09/24/17   2058  09/24/17   2310  09/25/17   0002  09/25/17   0202  09/25/17   0411   POCTGLUCOSE  133*  152*  189*  105  148*  126*  172*  174*  154*  142*       Current Medications and/or Treatments Impacting Glycemic Control  Immunotherapy:  Immunosuppressants     None        Steroids:   Hormones     None        Pressors:    Autonomic Drugs     None        Hyperglycemia/Diabetes Medications: Antihyperglycemics     Start     Stop Route Frequency Ordered    09/24/17 0915  insulin regular (Humulin R) 100 Units in sodium chloride 0.9% 100 mL infusion      -- IV Continuous 09/24/17 0916          ASSESSMENT and PLAN    * Laryngeal cancer    Per ENT           DM (diabetes mellitus), type 2, uncontrolled    BG goal 140-180. Change to low dose correction scale, monitor Q4h. Monitor for changes in TF/nutrition. TF goal 50 ml/hr.       Dispo: MDI will depend on TF bolus regimen.           Post-surgical hypoparathyroidism    Goal calcium low limit normal without symptoms   Calcium stable  Currently on Calcitriol 0.25mcg daily, tums 1g tid, wean as tolerated               On enteral nutrition    May increase insulin needs  TF goal 50 ml/hr          Hypothyroidism, postsurgical    On Levothyroxine 112 mcg daily.   Check FT4 in 4 weeks (~10/22)            Zeke Degroot DNP, NP  Endocrinology  [facility]

## 2017-09-25 NOTE — PLAN OF CARE
SW is well known to the ENT Service and was last discharged on September 7th with [facility] providing his trach supplies and [facility] for his tube feeds. Pt had a laryngectomy and will require aleksandra supplies and possibly home health, if SW can find an agency to accept Pt's [place] Medicaid. SW to continue to follow.     Shirley Sierra, DEYANIRAW

## 2017-09-25 NOTE — CONSULTS
"[facility]  Psychology  Consult Note    Diagnostic Interview - CPT 20239    Patient Name: Luís Esteban  MRN: [id number]   Patient Class: IP- Inpatient  Admission Date: 9/22/2017  Hospital Length of Stay: 3 days  Attending Physician: Aracely Rose MD  Primary Care Provider: KATRINA Mandujano    Inpatient consult to Hematology/Oncology Psychology  Consult performed by: ARIES CORDERO  Consult ordered by: MICAH GOLDEN  Reason for consult: post-laryngectomy  Assessment/Recommendations: Patient coping adequately with diagnosis/treatment.  Marijuana abstinence plan for post-discharge is sparse.  Patient not currently interested in psychosocial support or intervention.          Chief Complaint/Reason for Encounter: adaptation to disease and treatment    Luís Esteban, a 47 y.o. male, for initial evaluation visit.  Met with patient.    History of Present Illness:   Patient with SCC of larynx, s/p laryngectomy, consulted for adaptation to disease and treatment    Pain: 0    Symptoms:   · Mood: denied  · Anxiety: denied  · Substance Abuse: daily use of marijuana prior to diagnosis, unclear plans to maintain quit, unclear plans to transition to other activities, no alternative activities identified  · Cognitive Functioning: denied  · Health Behaviors: marijuana smoking, as per above    Psychiatric History: none    Past Medical History:   Diagnosis Date    Insulin dependent diabetes mellitus     Kidney failure     Laryngeal cancer     Substance abuse     Tracheostomy dependence        Family History of Psychiatric Illness: none    Social History (marriage, employment, etc.): Not , lives with his mother in [place], [place], works as a , when asked about hobbies/activities, stated, "spend my time getting high", chart note from [facility] indicates watching TV and playing video games as hobbies    Patient describes good social support from siblings (Vicki Gabriel, " Edwina Wasserman LaTasha)    Substance Use:   Alcohol: none   Drugs: daily marijuana use   Tobacco: none (former smoker)   Caffeine: none    Current Medications and Drug Reactions (include OTC, herbal):   See medication list.    Psychotherapeutics     None          Strengths and Liabilities: Strength: Patient has positive support network., Liability: Patient has poor health.; Liability:  History of non-compliance; Liability:  Limited literacy skills    Current Evaluation:     Mental Status Exam:  General Appearance:  age appropriate, thin & gaunt looking   Speech: Patient seen post-laryngectomy- communicated through gestures and mouthing words, was not using an AL      Level of Cooperation: guarded      Thought Processes: normal and logical   Mood: euthymic      Thought Content: normal, no suicidality, no homicidality, delusions, or paranoia   Affect: congruent and appropriate   Orientation: Oriented x3                             Diagnostic Impression - Plan:     * Laryngeal cancer    Patient coping adequately with diagnosis and laryngectomy.  Reports spiritual coping and family support.  Denies history of psychological distress or difficulty coping.  Anticipates no difficulty.  Denies psychosocial needs.         Marijuana use, continuous    Patient currently reports intention to maintain cessation of use post-hospitalization, without plan to cope with altered activities or change in life circumstance            Length of Service (minutes): 30    Shaji Saldivar, PhD  Psychology  [facility]

## 2017-09-25 NOTE — ASSESSMENT & PLAN NOTE
Patient currently reports intention to maintain cessation of use post-hospitalization, without plan to cope with altered activities or change in life circumstance

## 2017-09-25 NOTE — ASSESSMENT & PLAN NOTE
Goal calcium low limit normal without symptoms   Calcium stable  Currently on Calcitriol 0.25mcg daily, tums 1g tid, wean as tolerated

## 2017-09-25 NOTE — SUBJECTIVE & OBJECTIVE
"Interval HPI:   On Diabetasource AC at 20 ml/hr. NPO. BGs at goal with insulin turned off at 7AM today. Afebrile. No nausea. No hypoglycemic episodes.     /69 (BP Location: Right arm, Patient Position: Sitting)   Pulse 66   Temp 98.5 °F (36.9 °C) (Oral)   Resp (!) 60   Ht 5' 11" (1.803 m)   Wt 60 kg (132 lb 4.4 oz)   SpO2 99%   BMI 18.45 kg/m²     Labs Reviewed and Include      Recent Labs  Lab 09/25/17  0402   *   CALCIUM 8.5*   ALBUMIN 2.0*   PROT 6.3      K 3.8   CO2 31*   CL 99   BUN 18   CREATININE 0.7   ALKPHOS 69   ALT 15   AST 19   BILITOT 0.3     Lab Results   Component Value Date    WBC 13.16 (H) 09/25/2017    HGB 8.8 (L) 09/25/2017    HCT 26.1 (L) 09/25/2017    MCV 92 09/25/2017     (H) 09/25/2017     No results for input(s): TSH, FREET4 in the last 168 hours.  Lab Results   Component Value Date    HGBA1C 9.6 (H) 09/03/2017       Nutritional status:   Body mass index is 18.45 kg/m².  Lab Results   Component Value Date    ALBUMIN 2.0 (L) 09/25/2017    ALBUMIN 2.1 (L) 09/24/2017    ALBUMIN 2.2 (L) 09/22/2017     No results found for: PREALBUMIN    Estimated Creatinine Clearance: 109.5 mL/min (based on SCr of 0.7 mg/dL).    Accu-Checks  Recent Labs      09/24/17   1420  09/24/17   1547  09/24/17   1652  09/24/17   1826  09/24/17   1930  09/24/17   2058  09/24/17   2310  09/25/17   0002  09/25/17   0202  09/25/17   0411   POCTGLUCOSE  133*  152*  189*  105  148*  126*  172*  174*  154*  142*       Current Medications and/or Treatments Impacting Glycemic Control  Immunotherapy:  Immunosuppressants     None        Steroids:   Hormones     None        Pressors:    Autonomic Drugs     None        Hyperglycemia/Diabetes Medications: Antihyperglycemics     Start     Stop Route Frequency Ordered    09/24/17 0915  insulin regular (Humulin R) 100 Units in sodium chloride 0.9% 100 mL infusion      -- IV Continuous 09/24/17 0916        "

## 2017-09-25 NOTE — PLAN OF CARE
Patient is a 47 year old male admitted from home and underwent Direct Laryngoscopy, total Laryngectomy, Total Thyroidectomy, Right Neck Dissection with Right Pectoralis Major Myofascial Flap Coverage of Pharyneal Closure 9/22/2017.  Patient is expected to discharge home with tube feeds +/- 10/2/2017.    Patient lives with his mother and has sister that can also help care for patient and obtain anything he needs after discharge.  Patient discharged from [facility] 9/7/2017 with trach supplies from [facility] and tube feeds from [facility].  Patient is expected to resume services with companies at discharge.  Will continue to follow for needs.    PCP  KATRINA Mandujano  [address] / [place] [address] [phone]      [facility] ([place]) - [place], [place] - [address]  [address] [place] [address]  Phone: [phone] Fax: [phone]    [facility] [address] [address]  Phone: [phone] Fax: [phone]      Extended Emergency Contact Information  Primary Emergency Contact: Lisy Grant   [facility]  Home Phone: [phone]  Mobile Phone: [phone]  Relation: Sister  Preferred language: English       09/25/17 1459   Discharge Assessment   Assessment Type Discharge Planning Assessment   Confirmed/corrected address and phone number on facesheet? Yes   Assessment information obtained from? Medical Record   Expected Length of Stay (days) 10   Prior to hospitilization cognitive status: Alert/Oriented   Prior to hospitalization functional status: Independent   Current cognitive status: Alert/Oriented   Current Functional Status: Independent;Needs Assistance   Lives With parent(s)   Able to Return to Prior Arrangements yes   Is patient able to care for self after discharge? Yes   Who are your caregiver(s) and their phone number(s)? mom & sisters   Patient's  perception of discharge disposition home or selfcare   Readmission Within The Last 30 Days planned readmission   If yes, most recent facility name: [facility]   Equipment Currently Used at Home suction machine;nutrition supplies;feeding device   Do you have any problems affording any of your prescribed medications? No   Is the patient taking medications as prescribed? yes   Does the patient have transportation home? Yes   Transportation Available family or friend will provide   Discharge Plan A Home with family   Readmission Questionnaire   What do you believe caused you to be sick enough to be re-admitted? planned surgery    How often do you need to have someone help you when you read instructions, pamphlets, or other written material from your doctor or pharmacy? Often   Do you have problems taking your medications as prescribed? Yes   Do you have any problems affording any of  your prescribed medications? No   Do you have problems obtaining/receiving your medications? No   Does the patient have transportation to healthcare appointments? Yes   Living Arrangements house

## 2017-09-25 NOTE — ASSESSMENT & PLAN NOTE
47 y.o. male with laryngeal SCCA   POD 3 s/p total laryngectomy,  bilateral neck dissections (II-IV, VI), total thyroidectomy, R pectoralis major flap .     ENT:  -keep JPs  -no flap checks   -bacitracin to wounds  -aleksandra tube in stoma  -advance to day 3 of the pathway     Neuro:  - analgesia prn  -anti-emetics prn  -consulted Hem/Onc Psychology (Alecia)     Resp:  -humidified 02 via trach collar     CV:  - HDS; hydralazine prn     Heme/ID:  - Unasyn empiric post op coverage      Renal:  - good UOP s claros  - Strict I/Os     FEN/GI:  - strict NPO   -consider esophagram POD 7-10 per Dr. Rose/Pancho  -continuous TF via G tube per pathway              -Diabetasource AC goal of 50 cc/hr  - Replace lytes PRN     Endo (Endocrine consulted)  -postop PTH low, q8 ICa   rocaltrol, calcium carbonate; replacement c IV prn  -synthroid 55 mcg IV started  -glucose up to 400s last night, started on insulin gtt per ENDO recs     PPX:  -Pt/OT; OOB  - PPI  - L40     Dispo:  - Step down to PCU  -SW consulted for d/c planning

## 2017-09-25 NOTE — SUBJECTIVE & OBJECTIVE
"Pain: 0    Symptoms:   · Mood: denied  · Anxiety: denied  · Substance Abuse: daily use of marijuana prior to diagnosis, unclear plans to maintain quit, unclear plans to transition to other activities, no alternative activities identified  · Cognitive Functioning: denied  · Health Behaviors: marijuana smoking, as per above    Psychiatric History: none    Past Medical History:   Diagnosis Date    Insulin dependent diabetes mellitus     Kidney failure     Laryngeal cancer     Substance abuse     Tracheostomy dependence        Family History of Psychiatric Illness: none    Social History (marriage, employment, etc.): Not , lives with his mother in [place], [place], works as a , when asked about hobbies/activities, stated, "spend my time getting high", chart note from [facility] indicates watching TV and playing video games as hobbies    Patient describes good social support from siblings (Vicki Gabriel, Lisy, Edwina, Merry)    Substance Use:   Alcohol: none   Drugs: daily marijuana use   Tobacco: none (former smoker)   Caffeine: none    Current Medications and Drug Reactions (include OTC, herbal):   See medication list.    Psychotherapeutics     None          Strengths and Liabilities: Strength: Patient has positive support network., Liability: Patient has poor health.; Liability:  History of non-compliance; Liability:  Limited literacy skills    Current Evaluation:     Mental Status Exam:  General Appearance:  age appropriate, thin & gaunt looking   Speech: Patient seen post-laryngectomy- communicated through gestures and mouthing words, was not using an AL      Level of Cooperation: guarded      Thought Processes: normal and logical   Mood: euthymic      Thought Content: normal, no suicidality, no homicidality, delusions, or paranoia   Affect: congruent and appropriate   Orientation: Oriented x3                             "

## 2017-09-25 NOTE — NURSING TRANSFER
Nursing Transfer Note      9/25/2017     Transfer To: 606    Transfer via wheelchair    Transfer with to O2, cardiac monitoring    Transported by Ania Rowell     Medicines sent: Bacitracin, Insulin pen    Chart send with patient: Yes    Notified: daughter    Patient reassessed at: 9/25/17 18:45    Upon arrival to floor: cardiac monitor applied, patient oriented to room, call bell in reach and bed in lowest position

## 2017-09-25 NOTE — PHYSICIAN QUERY
PT Name: Luís Esteban  MR #: [id number]     Physician Query Form - Documentation Clarification      CDS/: Monie Kramer RN                Contact information:[email or web address]    This form is a permanent document in the medical record.     Query Date: September 25, 2017    By submitting this query, we are merely seeking further clarification of documentation. Please utilize your independent clinical judgment when addressing the question(s) below.    The Medical record reflects the following:    Supporting Clinical Findings Location in Medical Record   Squamous cell carcinoma of larynx - Primary       Patient with laryngeal squamous cell carcinoma. To OR for DL, Bilateral Neck Dissections, Total Laryngectomy, and Anterolateral thigh free flap. Risks, benefits, and alternatives discussed with the patient, all questions were answered, and he wishes to proceed with surgery.    H&P 9/22                                                                                      Doctor, Please specify diagnosis or diagnoses associated with above clinical findings. Please further specify the     Provider Use Only      __x__ aryepiglottic fold    __x__cartilage (arytenoid) (cricoid) (cuneiform) (thyroid)    __x__commissure (anterior) (posterior)    ____extrinsic    _x___interaryetenoid fold    __x__intrinsic    ____subglottis    ____supraglottis    ____ventricular band    ____overlapping lesion    ____other___________________    ____ unspecified                                                                                                             [  ] Clinically undetermined

## 2017-09-25 NOTE — PROGRESS NOTES
Progress Note  Surgical Intensive Care      Admit Date: 9/22/2017  Post-operative Day: 3 Days Post-Op  Hospital Day: 4    Patient is a 47 y.o. male with Hx of laryngeal cancer, Dm2, and gtube placement POD 2 from laryngectomy, bilateral neck dissection and pec flap .           INTERVAL HISTORY:   NAEON. Patient on trach collar. Sitting up in chair.      Continuous Infusions:   insulin (HUMAN R) infusion (adults) Stopped (09/25/17 0700)     Scheduled Meds:   ampicillin-sulbactim (UNASYN) IVPB  3 g Intravenous Q6H    calcitRIOL  0.25 mcg Per G Tube Daily    calcium carbonate  1,000 mg Per G Tube TID    enoxaparin  40 mg Subcutaneous Daily    levothyroxine  55 mcg Intravenous Daily    pantoprazole  40 mg Intravenous Daily    pantoprazole  40 mg Per NG tube Daily     PRN Meds:calcium gluconate IVPB, calcium gluconate IVPB, calcium gluconate IVPB, dextrose 50%, dextrose 50%, diphenhydrAMINE, glucagon (human recombinant), hydrALAZINE, HYDROmorphone, HYDROmorphone, magnesium sulfate IVPB, magnesium sulfate IVPB, ondansetron, potassium chloride **AND** potassium chloride **AND** potassium chloride, promethazine (PHENERGAN) IVPB    Review of patient's allergies indicates:  No Known Allergies    OBJECTIVE:     Vital Signs (Most Recent)  Temp: 98.5 °F (36.9 °C) (09/25/17 0715)  Pulse: 68 (09/25/17 0800)  Resp: 18 (09/25/17 0800)  BP: 136/73 (09/25/17 0800)  SpO2: 100 % (09/25/17 0800)    Vital Signs Range (Last 24H):  Temp:  [98.1 °F (36.7 °C)-98.6 °F (37 °C)]   Pulse:  [63-93]   Resp:  [11-60]   BP: (118-136)/(69-86)   SpO2:  [91 %-100 %]     I & O (Last 24H):    Intake/Output Summary (Last 24 hours) at 09/25/17 0830  Last data filed at 09/25/17 0400   Gross per 24 hour   Intake              4.5 ml   Output              715 ml   Net           -710.5 ml     Ventilator Data (Last 24H):     Oxygen Concentration (%):  [28-60] 28    Hemodynamic Parameters (Last 24H):       Physical Exam:  General: NAD  Neuro: AAOx4  HEENT:  neck supple. Trach collar in place  Cardio: S1 and S2, RRR  Resp: Moving air appropriately, breathing even and unlabored  Abd: Soft, NT, ND  Ext: Warm and well perfused        Laboratory (Last 24H):  CBC:     Recent Labs  Lab 09/25/17  0402   WBC 13.16*   RBC 2.84*   HGB 8.8*   HCT 26.1*   *   MCV 92   MCH 31.0   MCHC 33.7       CMP:     Recent Labs  Lab 09/25/17  0402   *   CALCIUM 8.5*   ALBUMIN 2.0*   PROT 6.3      K 3.8   CO2 31*   CL 99   BUN 18   CREATININE 0.7   ALKPHOS 69   ALT 15   AST 19   BILITOT 0.3       ABGs:     Recent Labs  Lab 09/22/17  2259   PH 7.371   PCO2 43.0   PO2 181*   HCO3 24.9   POCSATURATED 100   BE 0         Chest X-Ray:      ASSESSMENT/PLAN:       Neuro:   -A&Ox3     Pulmonary:   -Satting well on trach collar  -Pt with laryngeal stoma     Cardiac:  -HDS off pressors    Renal:   -BUN/Cr wnl  -UOP stable  -Continue to monitor     Infectious Disease:   -Afebrile  -Monitor for s/s of infection  -Unasyn empiric post op coverage     Hematology/Oncology:  -Trend post op H/h  -Transfuse PRN  -Monitor for signs of blood loss      Endocrine:  -ISS PRN  -Accuchecks  -May require insulin gtt     Fluids/Electrolytes/Nutrition/GI:   -Replete lytes PRN  -NPO for now   -PEG tube to gravity     Pain Management:   -Dilaudid IV PRN  -Transition to PO once able to take PO     Dispo: Stepdown    Nicholas Damon MD  PGY-4 Anesthesiology  P: [phone]

## 2017-09-25 NOTE — SUBJECTIVE & OBJECTIVE
Interval History: NAEON. Subdued. No resp issues; euglycemic c Insulin gtt    Medications:  Continuous Infusions:   insulin (HUMAN R) infusion (adults) Stopped (09/25/17 0700)     Scheduled Meds:   ampicillin-sulbactim (UNASYN) IVPB  3 g Intravenous Q6H    calcitRIOL  0.25 mcg Per G Tube Daily    calcium carbonate  1,000 mg Per G Tube TID    enoxaparin  40 mg Subcutaneous Daily    levothyroxine  55 mcg Intravenous Daily    pantoprazole  40 mg Intravenous Daily    pantoprazole  40 mg Per NG tube Daily     PRN Meds:calcium gluconate IVPB, calcium gluconate IVPB, calcium gluconate IVPB, dextrose 50%, dextrose 50%, diphenhydrAMINE, glucagon (human recombinant), hydrALAZINE, HYDROmorphone, HYDROmorphone, magnesium sulfate IVPB, magnesium sulfate IVPB, ondansetron, potassium chloride **AND** potassium chloride **AND** potassium chloride, promethazine (PHENERGAN) IVPB     Review of patient's allergies indicates:  No Known Allergies  Objective:     Vital Signs (24h Range):  Temp:  [98.1 °F (36.7 °C)-98.6 °F (37 °C)] 98.5 °F (36.9 °C)  Pulse:  [66-93] 66  Resp:  [11-60] 60  SpO2:  [91 %-100 %] 99 %  BP: (118-139)/(69-86) 131/69        Lines/Drains/Airways     Drain                 Gastrostomy/Enterostomy 08/31/17 1900 24 days         Closed/Suction Drain 09/22/17 1708 Medial Neck Bulb 15 Fr. 2 days         Closed/Suction Drain 09/22/17 1708 Right Neck Bulb 15 Fr. 2 days         Closed/Suction Drain 09/22/17 1709 Left Neck Bulb 15 Fr. 2 days         Closed/Suction Drain 09/22/17 1709 Right Chest Bulb 15 Fr. 2 days         Closed/Suction Drain 09/22/17 1710 Right Chest Bulb 15 Fr. 2 days          Airway                 Surgical Airway 09/24/17 1300 less than 1 day                Physical Exam    NAD,alert; sitting in chair  Neck incisions clean c staple line intact; JPs holding suction  Laryngectomy stoma intact, aleksandra tube in place c HME  Right pec/chest incision s hematoma; JPs holding suction    Right lateral neck: 0  cc  Right medial neck: 0 cc  Left neck: 5 cc  Right chest 1: 5  Right chest 2: 5    Significant Labs:    CBC    Recent Labs  Lab 09/23/17  0302 09/24/17  0500 09/25/17  0402   WBC 15.93* 14.73* 13.16*   HGB 9.1* 8.7* 8.8*   HCT 25.8* 25.8* 26.1*   MCV 89 93 92   * 403* 387*     BMP    Recent Labs  Lab 09/23/17  0302 09/24/17  0500 09/25/17  0402   * 414* 125*    134* 140   K 4.2 4.5 3.8    95 99   CO2 26 26 31*   BUN 18 22* 18   CREATININE 0.7 0.8 0.7   CALCIUM 8.0* 8.5* 8.5*   PHOS 3.9  --   --    MG 1.4*  --   --      COAGS  No results for input(s): PROTIME, INR, PTT in the last 72 hours.    PTH:     Recent Labs  Lab 09/23/17  0822   PTH <5.0*     Ionized Calcium: 1.04 --> 1.11    Significant Diagnostics:  cxr 9/23/17  Chest single view compared to September 22.  Postop changes noted. Tracheostomy cannula and drainage tubes remain.  No significant change in cardiopulmonary status allowing for technique and positioning.

## 2017-09-25 NOTE — PROGRESS NOTES
[facility]  Otorhinolaryngology-Head & Neck Surgery  Progress Note    Subjective:     Post-Op Info:  Procedure(s) (LRB):  DISSECTION-NECK (Right)  LARYNGOSCOPY (N/A)  FLAP-ROTATION (N/A)  LARYNGECTOMY (N/A)  DISSECTION-NECK (Left)   3 Days Post-Op  Hospital Day: 4     Interval History: NAEON. Subdued. No resp issues; euglycemic c Insulin gtt    Medications:  Continuous Infusions:   insulin (HUMAN R) infusion (adults) Stopped (09/25/17 0700)     Scheduled Meds:   ampicillin-sulbactim (UNASYN) IVPB  3 g Intravenous Q6H    calcitRIOL  0.25 mcg Per G Tube Daily    calcium carbonate  1,000 mg Per G Tube TID    enoxaparin  40 mg Subcutaneous Daily    levothyroxine  55 mcg Intravenous Daily    pantoprazole  40 mg Intravenous Daily    pantoprazole  40 mg Per NG tube Daily     PRN Meds:calcium gluconate IVPB, calcium gluconate IVPB, calcium gluconate IVPB, dextrose 50%, dextrose 50%, diphenhydrAMINE, glucagon (human recombinant), hydrALAZINE, HYDROmorphone, HYDROmorphone, magnesium sulfate IVPB, magnesium sulfate IVPB, ondansetron, potassium chloride **AND** potassium chloride **AND** potassium chloride, promethazine (PHENERGAN) IVPB     Review of patient's allergies indicates:  No Known Allergies  Objective:     Vital Signs (24h Range):  Temp:  [98.1 °F (36.7 °C)-98.6 °F (37 °C)] 98.5 °F (36.9 °C)  Pulse:  [66-93] 66  Resp:  [11-60] 60  SpO2:  [91 %-100 %] 99 %  BP: (118-139)/(69-86) 131/69        Lines/Drains/Airways     Drain                 Gastrostomy/Enterostomy 08/31/17 1900 24 days         Closed/Suction Drain 09/22/17 1708 Medial Neck Bulb 15 Fr. 2 days         Closed/Suction Drain 09/22/17 1708 Right Neck Bulb 15 Fr. 2 days         Closed/Suction Drain 09/22/17 1709 Left Neck Bulb 15 Fr. 2 days         Closed/Suction Drain 09/22/17 1709 Right Chest Bulb 15 Fr. 2 days         Closed/Suction Drain 09/22/17 1710 Right Chest Bulb 15 Fr. 2 days          Airway                 Surgical Airway  09/24/17 1300 less than 1 day                Physical Exam    NAD,alert; sitting in chair  Neck incisions clean c staple line intact; JPs holding suction  Laryngectomy stoma intact, aleksandra tube in place c HME  Right pec/chest incision s hematoma; JPs holding suction    Right lateral neck: 0 cc  Right medial neck: 0 cc  Left neck: 5 cc  Right chest 1: 5  Right chest 2: 5    Significant Labs:    CBC    Recent Labs  Lab 09/23/17  0302 09/24/17  0500 09/25/17  0402   WBC 15.93* 14.73* 13.16*   HGB 9.1* 8.7* 8.8*   HCT 25.8* 25.8* 26.1*   MCV 89 93 92   * 403* 387*     BMP    Recent Labs  Lab 09/23/17  0302 09/24/17  0500 09/25/17  0402   * 414* 125*    134* 140   K 4.2 4.5 3.8    95 99   CO2 26 26 31*   BUN 18 22* 18   CREATININE 0.7 0.8 0.7   CALCIUM 8.0* 8.5* 8.5*   PHOS 3.9  --   --    MG 1.4*  --   --      COAGS  No results for input(s): PROTIME, INR, PTT in the last 72 hours.    PTH:     Recent Labs  Lab 09/23/17  0822   PTH <5.0*     Ionized Calcium: 1.04 --> 1.11    Significant Diagnostics:  cxr 9/23/17  Chest single view compared to September 22.  Postop changes noted. Tracheostomy cannula and drainage tubes remain.  No significant change in cardiopulmonary status allowing for technique and positioning.    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     ENT:  -keep JPs  -no flap checks   -bacitracin to wounds  -aleksandra tube in stoma  -advance to day 3 of the pathway     Neuro:  - analgesia prn  -anti-emetics prn  -consulted Hem/Onc Psychology (Alecia)     Resp:  -humidified 02 via trach collar     CV:  - HDS; hydralazine prn     Heme/ID:  - Unasyn empiric post op coverage      Renal:  - good UOP s claros  - Strict I/Os     FEN/GI:  - strict NPO   -consider esophagram POD 7-10 per Dr. Rose/Pancho  -continuous TF via G tube per pathway               -Diabetasource AC goal of 50 cc/hr  - Replace lytes PRN     Endo (Endocrine consulted)  -postop PTH low, q8 ICa   rocaltrol, calcium carbonate; replacement c IV prn  -synthroid 55 mcg IV started  -glucose up to 400s last night, started on insulin gtt per ENDO recs     PPX:  -Pt/OT; OOB  - PPI  - L40     Dispo:  - Step down to PCU  -SW consulted for d/c planning            Wiliam Meeks MD  Otorhinolaryngology-Head & Neck Surgery  [facility]

## 2017-09-25 NOTE — PHYSICIAN QUERY
PT Name: Luís Esteban  MR #: [id number]    Physician Query Form - Nutrition Clarification     CDS/: Monie Krmaer RN                 Contact information:[email or web address]    This form is a permanent document in the medical record.     Query Date: September 25, 2017    By submitting this query, we are merely seeking further clarification of documentation.. Please utilize your independent clinical judgment when addressing the question(s) below.    The Medical record contains the following:   Indicators  Supporting Clinical Findings Location in Medical Record   x % of Estimated Energy Intake over a time frame from p.o., TF, or TPN Meet % EEN, EPN    Continuous: Glucerna 1.5 @ 50 mL/hr  Nutrtition CN 9/23   x Weight Status over a time frame Endorses 20 pound weight loss in past 2-3 months    Positive for unexpected weight change H&P 9/14    Subcutaneous Fat and/or Muscle Loss      Fluid Accumulation or Edema      Reduced  Strength     x Wt / BMI / Usual Body Weight BMI (Calculated): 18.5 Nutrition CN 9/23    Delayed Wound Healing / Failure to Thrive     x Acute or Chronic Illness Patient with laryngeal squamous cell carcinoma. H&P 9/14    Medication     x Treatment He is doing well with his tube feeds H&P 9/14   x Other  Positive for trouble swallowing and voice change  He has a sickly appearance    Overall Physical Appearance: underweight H&P 9/14        Nutrition CN 9/23     AND / ASPEN Clinical Characteristics (October 2011)  A minimum of two characteristics is recommended for diagnosing either moderate or severe malnutrition   Mild Malnutrition Moderate Malnutrition Severe Malnutrition   Energy Intake from p.o., TF or TPN. < 75% intake of estimated energy needs for less than 7 days < 75% intake of estimated energy needs for greater than 7 days < 50% intake of estimated energy needs for > 5 days   Weight Loss 1-2% in 1 month  5% in 3 months  7.5% in 6 months  10% in 1 year 1-2 % in 1 week  5%  in 1 month  7.5% in 3 months  10% in 6 months  20% in 1 year > 2% in 1 week  > 5% in 1 month  > 7.5% in 3 months  > 10% in 6 months  > 20% in 1 year   Physical Findings     None *Mild subcutaneous fat and/or muscle loss  *Mild fluid accumulation  *Stage II decubitus  *Surgical wound or non-healing wound *Mod/severe subcutaneous fat and/or muscle loss  *Mod/severe fluid accumulation  *Stage III or IV decubitus  *Non-healing surgical wound     Provider, please specify diagnosis or diagnoses associated with above clinical findings.    [ ] Mild Protein-Calorie Malnutrition  [x ] Moderate Protein-Calorie Malnutrition  [ ] Severe Protein-Calorie Malnutrition  [ ] Abnormal Weight Loss  [x ] Underweight  [ ] Other Nutritional Diagnosis (please specify): ____________________________________  [ ] Other: ________________________________  [ ] Clinically Undetermined    Please document in your progress notes daily for the duration of treatment until resolved and include in your discharge summary.

## 2017-09-25 NOTE — PT/OT/SLP EVAL
Physical Therapy  Evaluation    Luís Esteban   MRN: [id number]   Admitting Diagnosis: Laryngeal cancer    PT Received On: 09/25/17  PT Start Time: 1315     PT Stop Time: 1330    PT Total Time (min): 15 min       Billable Minutes:  Evaluation 15    Diagnosis: Laryngeal cancer      Past Medical History:   Diagnosis Date    Insulin dependent diabetes mellitus     Kidney failure     Laryngeal cancer     Substance abuse     Tracheostomy dependence       Past Surgical History:   Procedure Laterality Date    DIRECT LARYNGOBRONCHOSCOPY  08/31/2017         General Precautions: Standard,  (aspiration, NPO)           Patient History:  Lives With: parent(s)  Living Arrangements: house  Home Layout: Able to live on 1st floor  Previous Level of Function:  Ambulation Skills: independent  Transfer Skills: independent  ADL Skills: independent  Work/Leisure Activity: independent    Subjective:  Communicated with RN prior to session.  Attempted in am; however, pt refused.  Returned in pm.  Pain/Comfort  Pain Rating 1: 7/10  Location 1:  (neck)  Pain Addressed 1: Distraction  Pain Rating Post-Intervention 1: 7/10      Objective:   Patient found with:  (trach, tele, pulse ox, PEG, Bp cuff)     Cognitive Exam:  Oriented to: Person, Place and Time    Follows Commands/attention: Follows multistep  commands  Communication: trach  Safety awareness/insight to disability: intact    Physical Exam:    Lower Extremity Range of Motion:  Right Lower Extremity: WFL  Left Lower Extremity: WFL    Lower Extremity Strength:  Right Lower Extremity: WFL  Left Lower Extremity: WFL       Functional Mobility:  Bed Mobility:  Rolling/Turning Right: Supervision  Scooting/Bridging: Supervision  Supine to Sit: Supervision  Sit to Supine: Supervision    Transfers:  Sit <> Stand Assistance: Stand By Assistance  Sit <> Stand Assistive Device: No Assistive Device    Gait:   Gait Distance:  (60 feet)  Assistance 1: Stand by Assistance  Gait Assistive  Device: No device  Gait Pattern: 2-point gait  Gait Deviation(s): decreased edwige    Balance:   Static Sit: FAIR+: Able to take MINIMAL challenges from all directions  Dynamic Sit: FAIR+: Maintains balance through MINIMAL excursions of active trunk motion  Static Stand: FAIR+: Takes MINIMAL challenges from all directions  Dynamic stand: FAIR: Needs CONTACT GUARD during gait    Therapeutic Activities and Exercises:  Encouraged for OOB.    AM-PAC 6 CLICK MOBILITY  How much help from another person does this patient currently need?   1 = Unable, Total/Dependent Assistance  2 = A lot, Maximum/Moderate Assistance  3 = A little, Minimum/Contact Guard/Supervision  4 = None, Modified [place]/Independent    Turning over in bed (including adjusting bedclothes, sheets and blankets)?: 4  Sitting down on and standing up from a chair with arms (e.g., wheelchair, bedside commode, etc.): 4  Moving from lying on back to sitting on the side of the bed?: 4  Moving to and from a bed to a chair (including a wheelchair)?: 4  Need to walk in hospital room?: 3  Climbing 3-5 steps with a railing?: 1  Total Score: 20     AM-PAC Raw Score CMS G-Code Modifier Level of Impairment Assistance   6 % Total / Unable   7 - 9 CM 80 - 100% Maximal Assist   10 - 14 CL 60 - 80% Moderate Assist   15 - 19 CK 40 - 60% Moderate Assist   20 - 22 CJ 20 - 40% Minimal Assist   23 CI 1-20% SBA / CGA   24 CH 0% Independent/ Mod I     Patient left supine with all lines intact and call button in reach.    Assessment:   Luís Esteban is a 47 y.o. male with a medical diagnosis of Laryngeal cancer and presents with no PT needs.    Rehab identified problem list/impairments: Rehab identified problem list/impairments: pain      Barriers to discharge: Barriers to Discharge: None    Equipment recommendations: Equipment Needed After Discharge: none     GOALS:    Physical Therapy Goals        Problem: Physical Therapy Goal    Goal Priority Disciplines  Outcome Goal Variances Interventions   Physical Therapy Goal     PT/OT, PT Ongoing (interventions implemented as appropriate)                     PLAN:    Discharge from PT.        Ankita Johns, PT  09/25/2017

## 2017-09-25 NOTE — PT/OT/SLP EVAL
Occupational Therapy  Evaluation and Discharge    Luís Esteban   MRN: [id number]   Admitting Diagnosis: Laryngeal cancer    OT Date of Treatment: 09/25/17   OT Start Time: 1320  OT Stop Time: 1340  OT Total Time (min): 20 min    Billable Minutes:  Evaluation 15    Diagnosis: Laryngeal cancer       Past Medical History:   Diagnosis Date    Insulin dependent diabetes mellitus     Kidney failure     Laryngeal cancer     Substance abuse     Tracheostomy dependence       Past Surgical History:   Procedure Laterality Date    DIRECT LARYNGOBRONCHOSCOPY  08/31/2017       General Precautions: Standard, fall, aspiration, NPO  Orthopedic Precautions: N/A      Patient History:  Living Environment  Lives With: parent(s)  Living Environment Comment: Pt lives with mother in one story home with 2 [place]. Pt wtihout AE/DME and was independent PTA.     Subjective:  Communicated with nsg prior to session.  Pt mouthing words and using hands to gesture.     Pain/Comfort  Pain Rating 1: 7/10  Location 1:  (neck)  Pain Addressed 1: Reposition, Distraction, Pre-medicate for activity, Nurse notified    Objective:   pt fouind supine in bed this AM and declined activity. OT returned in PM and pt agreeable therapy.       Cognitive Exam:  Pt awake/alert and following commands. Pt with flat affect and appeared frustrated throughout all interaction. Nsg verified that pt has been frustrated since admit.     Physical Exam:  Pt demo WFL UE strength and coordination.     Functional Mobility:  Bed Mobility:  Supine to Sit: Independent  Sit to Supine: Independent    Transfers:  Sit <> Stand Assistance: Independent  Sit <> Stand Assistive Device: No Assistive Device    Activities of Daily Living:    Pt demo ability to complete basic self care tasks with set-up simulated. Pt demo standing balance/endurance, coordination, strength needed to complete these tasks with supervision for monitoring of lines/tubes in ICU.     Pt declined sitting in chair  "thus pt returned to bed.     Pt completed functional mobility in room with supervision for line jonelle't. Vital signs remained intact. Pt moves quickly with apparent frustration but no attempts to communicate with available AL.    AM-PAC 6 CLICK ADL  How much help from another person does this patient currently need?  1 = Unable, Total/Dependent Assistance  2 = A lot, Maximum/Moderate Assistance  3 = A little, Minimum/Contact Guard/Supervision  4 = None, Modified [place]/Independent    Putting on and taking off regular lower body clothing? : 3  Bathing (including washing, rinsing, drying)?: 3  Toileting, which includes using toilet, bedpan, or urinal? : 3  Putting on and taking off regular upper body clothing?: 3  Taking care of personal grooming such as brushing teeth?: 4  Eating meals?: 1  Total Score: 17    AM-PAC Raw Score CMS "G-Code Modifier Level of Impairment Assistance   6 % Total / Unable   7 - 9 CM 80 - 100% Maximal Assist   10-14 CL 60 - 80% Moderate Assist   15 - 19 CK 40 - 60% Moderate Assist   20 - 22 CJ 20 - 40% Minimal Assist   23 CI 1-20% SBA / CGA   24 CH 0% Independent/ Mod I       Patient left supine with all lines intact, call button in reach, nsg notified and fly present    Assessment:  Luís Esteban is a 47 y.o. male with a medical diagnosis of Laryngeal cancer and tolerated session well. Pt does not demo need for skilled OT at this time. OT to d/c services. .    GOALS:    Occupational Therapy Goals     Not on file                PLAN: D/C inpatient OT services.        Marianne Hyde, LOTR  09/25/2017  "

## 2017-09-25 NOTE — ASSESSMENT & PLAN NOTE
BG goal 140-180. Change to low dose correction scale, monitor Q4h. Monitor for changes in TF/nutrition. TF goal 50 ml/hr.       Dispo: MDI will depend on TF bolus regimen.

## 2017-09-25 NOTE — PLAN OF CARE
Problem: SLP Goal  Goal: SLP Goal  Speech Language Pathology Goals  Goals expected to be met by 10/1    1. Pt/family will actively participate in post-laryngectomy education to facilitate Holmes and desensitization  2. Pt will communicate contextualized phrases via AL with approximately 60% intelligibility provided min cues to improve communication.  3. Pt/family will perform stoma care x1 per session provided min cues to facilitate Holmes.                 Pt with continued progress towards goals.    Deja Tellez M.A. CCC-SLP  Speech Language Pathologist  [phone]  9/25/2017

## 2017-09-25 NOTE — OP NOTE
DATE OF PROCEDURE:  09/22/2017.    PREOPERATIVE DIAGNOSIS:  T4 N0 M0 squamous cell carcinoma of the larynx.    POSTOPERATIVE DIAGNOSIS:  T4 N0 M0 squamous cell carcinoma of the larynx.    PROCEDURE PERFORMED:  1.  Left modified neck dissection including levels 2 through 4.  2.  Total laryngectomy.  3.  Total thyroidectomy with right paratracheal/upper mediastinal neck   dissection.  4.  Right pectoralis major myofascial flap coverage of pharyngeal closure.    SURGEON:  Mk Deleon M.D.    ASSISTANT:  Wiliam Meeks M.D. (RES).    ANESTHESIA:  General endotracheal.    ESTIMATED BLOOD LOSS:  For my portion is 100 mL.    SPECIMENS:  For my portion of the procedure:  1.  Left neck dissection level 2A for permanent.  2.  Left neck dissection level 2B for permanent.  3.  Left neck dissection level 3 for permanent.  4.  Left neck dissection level 4 for permanent.  5.  Tissue of strap muscle for frozen.  6.  Total laryngectomy, total thyroidectomy, right paratracheal upper   mediastinal neck dissection opened on backtable for permanent.  7.  Left pharyngeal margin for frozen.    INDICATIONS FOR PROCEDURE:  Mr. Esteban is a 47-year-old gentleman who was   recently evaluated in the Emergency Room of [facility] for   hoarseness and shortness of breath.  He had undergone CT scan of the neck at an   outside facility, which revealed a large laryngeal mass worrisome for carcinoma.    He was taken to the Operating Room during that admission where an awake   tracheostomy was performed as well as direct laryngoscopy with biopsy.  Biopsies   of the lesion were consistent with squamous cell carcinoma.  His imaging   studies staged him T4 N0 M0.  In light of these findings, it was recommended   that we proceed to the Operating Room for total laryngectomy with the additional   procedures as above.  Please note that Dr. Aracely Rose performed the   right-sided neck dissection.  Please see her note for  details.    The patient was apprised of the risks, benefits and alternatives to surgery.  In   spite of the risks inherent to surgery, he provided informed consent.    PROCEDURE IN DETAIL:  The patient was taken to the Operating Room and placed on   the operating table in supine position.  General endotracheal anesthesia was   induced by the Anesthesia Team.  The operating table was rotated 180 degrees.    The patient's face, neck and chest was prepped and draped in standard sterile   fashion.    Dr. Rose initiated the procedure and performed the right-sided neck   dissection.  Please see her note for details.  When her portion of the procedure   was complete, I entered the room.  The left neck was addressed.  The anterior   border of the sternocleidomastoid muscle was skeletonized.  The sternal head of   the sternocleidomastoid was  from the sternum and divided with the   Bovie.  Dissection proceeded superiorly to identify the spinal accessory nerve.    Blunt dissection proceeded along the spinal accessory nerve, dividing the   overlying fibrofatty tissue to identify the posterior belly of the digastric   muscle.  Just deep to this structure was noted the intersection between the   spinal accessory nerve and the internal jugular vein.  The posterior belly of   the digastric was then skeletonized up to the facial vein.  Our attention was   then turned to level 2B.  The fibrofatty tissue of level 2B was sharply divided   and freed from the sternocleidomastoid muscle down to the underlying deep   cervical fascia.  It was then bluntly  from the lateral border of the   internal jugular vein and the entire specimen was swept down in continuity with   the remainder of the neck dissection.  The fibrofatty tissue of levels 2A, 3 and   4 was then divided with the Bovie down to the underlying floor of the   neck/cervical rootlets.  The omohyoid muscle was identified and divided.    Dissection then  proceeded from posterior to anterior, elevating the specimen off   the floor of the neck and out of the carotid sheath.  Inferiorly, several   pedicles were taken in which the tissue remaining in the neck was clamped,   divided and ligated with a 2-0 silk tie.  The thoracic duct and phrenic nerve   were visualized and were undisturbed.  Blunt dissection then proceeded within   the carotid sheath.  The sheath was bluntly opened utilizing a tonsil clamp and   the attachments between the specimen and the common carotid artery/vagus nerve   were divided.  The specimen was then left pedicled on the internal jugular vein.    It was then  from the internal jugular vein utilizing a #15 blade.    It was then taken to the backtable and divided into levels as above.  These   specimens were then sent to Pathology for permanent analysis.  At this time, a   frozen section was taken from some fibrotic tissue overlying the strap muscles   just superior to the trach site.  This proved benign on frozen section.    Dissection then proceeded inferiorly to separate the strap muscles from the   sternum.  Running superiorly, the laryngotracheal complex was  from the   carotid sheath bilaterally.  The superior thyroid artery and superior laryngeal   neurovascular bundle was isolated and divided on each side.  Each of these   structures was suture ligated as well with 2-0 silk tie.  The constrictor   muscles were then freed from the thyroid cartilage utilizing the Bovie.  The   piriform sinuses were elevated utilizing a [place] elevator.  Dissection then   proceeded inferiorly where right paratracheal/upper mediastinal neck dissection   was performed.  Blunt dissection proceeded along the common carotid artery to   the junction with the innominate artery.  The fibrofatty tissue of the right   level 6 was elevated off the floor of the neck, namely from the prevertebral   fascia in the esophagus and kept in continuity with  our specimen.  At this time,   these maneuvers allowed the thyroid gland also to be incorporated with the   total laryngectomy specimen.    At this time, the trachea was divided roughly two rings below the trach site.    The trachea mucosa was noted to be grossly normal.  The party wall was then   divided utilizing the Bovie and elevated up to the level of the posterior   cricoarytenoid fascia.    Our attention was then turned superiorly where the hyoid bone was skeletonized   utilizing the Bovie.  Care was taken to hug the hyoid bone in order to avoid   injury to the hypoglossal nerve.    Next, a [place] retractor was placed in the mouth and the pharynx was entered   through the vallecula.  The epiglottis was grasped.  The mucosa just lateral to   each aryepiglottic fold was then divided utilizing Metzenbaum scissors.  The   postcricoid mucosa was then divided utilizing a #15 blade.  The final   attachments between the specimen and the party wall were then taken down   utilizing Metzenbaum scissors.  The tumor was noted to be entirely endolaryngeal   and the entirety of the pharynx was preserved.    Next, the specimen was then taken to the backtable and divided in the sagittal   plane utilizing curved Carrera scissors.  We were noted to be widely around the   tumor.  Our closest margin was the left pharyngeal margin, which was sent for   frozen section and returned benign.    Next, the stoma was fashioned utilizing interrupted 3-0 Vicryl suture placed as   half mattress sutures between the skin of the superior chest and the trachea.    Once the stoma was fashioned, the pharynx was closed utilizing interrupted 3-0   Vicryl sutures placed as [place] sutures in an effort to invert the mucosa and   to avoid the fistula.  The water-tightness of our closure was then tested   utilizing dilute methylene blue.  It was noted to be watertight.  There were no   obvious leaks.  The neck was then irrigated with copious amounts of  saline and   suctioned dry.  Hemostasis was achieved with the bipolar.    Given the patient's overall deconditioned state and his brittle diabetes, we   elected to reinforce our pharyngeal closure with a right-sided myofascial   pectoralis major flap.    Our attention was then turned to the right chest.  A curvilinear incision was   then carried out from the anterior axillary line just extending medial to the   nipple.  This was accomplished utilizing the Bovie.  Sharp dissection proceeded   through the underlying subcutaneous tissue and pectoralis fascia.  Subcutaneous   tissue was then elevated off the pectoralis muscle to identify the lateral   border of the muscle.  A small skin subcutaneous tissue flap was then elevated   medially as well.  The inferior border of the muscle was then divided utilizing   the Bovie as well as the medial aspect divided utilizing the Bovie up to the   level of the clavicle.  Numerous intercostal perforators were isolated, clipped   and divided during this portion of the procedure.    The pectoralis major muscle was then elevated off the underlying pectoralis   minor.  The thoracoacromial pedicle was identified running on the undersurface   of the muscle and was preserved.  Finally, the attachments to the humerus were   taken down with the pedicle in full view utilizing the Bovie.  At this time,   adequate rotation was achieved and a subcutaneous tunnel was created over the   clavicle and into the right neck wide enough to accommodate four of my fingers.    The flap was then passed through the tunnel and laid over the pharynx in order   to bolster the closure.  Hemostasis was then achieved in the right chest   utilizing electrocautery.  The right chest was then closed over two 15-[place]   Wiliam drains utilizing 3-0 Vicryl suture and staples.  Each drain was secured   with silk suture.    Next, the neck was irrigated with copious amounts of saline and suctioned dry.    Hemostasis was  again assured with electrocautery.  Please note that clean   instruments were utilized to close both the neck and the chest.  The neck was   then closed over three 15-[place] Wiliam drains, one running in each lateral   gutter and one running along the pharynx.  Each of these was secured with silk   suture.  The neck was then closed with 3-0 Vicryl and skin staples.  The   posterior wall of the stoma was fashioned utilizing interrupted 3-0 Vicryl.    When the wounds were closed, the patient was handed back to Anesthesia, awakened   and weaned from the ventilator and transported to ICU in satisfactory   condition.  There were no intraoperative complications.  I was present and   participated in the entire procedure.      CPH/ANA  dd: 09/25/2017 08:11:18 (CDT)  td: 09/25/2017 09:27:39 (CDT)  Doc ID   #[id number]  Job ID #[id number]    CC:

## 2017-09-25 NOTE — ASSESSMENT & PLAN NOTE
Patient coping adequately with diagnosis and laryngectomy.  Reports spiritual coping and family support.  Denies history of psychological distress or difficulty coping.  Anticipates no difficulty.  Denies psychosocial needs.

## 2017-09-25 NOTE — PLAN OF CARE
Problem: Patient Care Overview  Goal: Plan of Care Review  Outcome: Ongoing (interventions implemented as appropriate)  All VSS. Patient on room air with no distress noted.Oxygen saturation between %. Patient voiding spontaneously with urinal. HUNTER drains putting out scant amount of serosanguinous fluid. Patient on insulin gtt @0.4 units/ hr. Hourly blood glucose monitoring. Plan for today is to step patient down to the floor. Waiting on bed to become available. Will continue to monitor.

## 2017-09-25 NOTE — PT/OT/SLP PROGRESS
Speech Language Pathology  Treatment    Luís Esteban   MRN: [id number]   Admitting Diagnosis: Laryngeal cancer    SLP Treatment Date: 09/25/17  Speech Start Time: 1056     Speech Stop Time: 1117     Speech Total (min): 21 min       TREATMENT BILLABLE MINUTES:  Speech Therapy Individual 10 and Seld Care/Home Management Training 11    Has the patient been evaluated by SLP for swallowing? : No      General Precautions: Standard, aspiration, fall  Current Respiratory Status: laryngectomy (neck breather)       Subjective:  Pt awake; pleasant    Pain/Comfort  Pain Rating 1: 0/10  Pain Rating Post-Intervention 1: 0/10    Objective:     Pt presented with HME, aleksandra tube, and collar in place. All laryngectomy precautions in room. Patient familiar to clinician from previous visit. Pt identified post surgical anatomical changes and named aleksandra tube with 100% acc indep. Pt identified purpose./function of HME with 100% acc provided binary choice. Education provided to pt on the importance of signing the [employer] consent form to receive medical supplies post op; pt filled out indep. Teaching of stoma care initiated. Collar removed with aleksandra tube and HME. HME removed; soiled and discarded. Aleksandra tube cleaned with running water and tube brush. Aleksandra tube reinserted with clean HME. Collar fastened. Pt agreed to participate in stoma care at next visit. AL assembled at BS. Pt counted 1-10 with good intelligibility; benefited from min cue to identify location of intraoral adaptor, over articulation and on/off timing.  Pt reports he doesn't writing well; encouraged pt to use AL , gestures, facial expressions, and mouthing words as means of communication. No further questions. RN notified of need for 'kit' to travel with pt as he steps down to floor.                                   Assessment:  Luís Esteban is a 47 y.o. male with a medical diagnosis of Laryngeal cancer and presents with post laryngectomy care. continued progress  towards goals.      Discharge recommendations: Discharge Facility/Level Of Care Needs: home health speech therapy     Goals:    SLP Goals        Problem: SLP Goal    Goal Priority Disciplines Outcome   SLP Goal     SLP    Description:  Speech Language Pathology Goals  Goals expected to be met by 10/1    1. Pt/family will actively participate in post-laryngectomy education to facilitate Reva and desensitization  2. Pt will communicate contextualized phrases via AL with approximately 60% intelligibility provided min cues to improve communication.  3. Pt/family will perform stoma care x1 per session provided min cues to facilitate Reva.                                 Plan:   Patient to be seen Therapy Frequency: 5 x/week   Plan of Care expires: 10/23/17  Plan of Care reviewed with: patient  SLP Follow-up?: Yes             Deja Tellez M.A. CCC-SLP  Speech Language Pathologist  [phone]  9/25/2017

## 2017-09-26 PROBLEM — E10.65 TYPE 1 DIABETES MELLITUS WITH HYPERGLYCEMIA: Chronic | Status: ACTIVE | Noted: 2017-09-04

## 2017-09-26 PROBLEM — Z79.4 UNCONTROLLED TYPE 2 DIABETES MELLITUS WITH HYPERGLYCEMIA, WITH LONG-TERM CURRENT USE OF INSULIN: Chronic | Status: ACTIVE | Noted: 2017-09-04

## 2017-09-26 PROBLEM — E11.65 UNCONTROLLED TYPE 2 DIABETES MELLITUS WITH HYPERGLYCEMIA, WITH LONG-TERM CURRENT USE OF INSULIN: Chronic | Status: ACTIVE | Noted: 2017-09-04

## 2017-09-26 LAB
C PEPTIDE SERPL-MCNC: <0.08 NG/ML
CA-I BLDV-SCNC: 0.95 MMOL/L
GLUCOSE SERPL-MCNC: 270 MG/DL
LACTATE SERPL-SCNC: 0.8 MMOL/L
MAGNESIUM SERPL-MCNC: 1.2 MG/DL
PHOSPHATE SERPL-MCNC: 3.6 MG/DL
POCT GLUCOSE: 180 MG/DL (ref 70–110)
POCT GLUCOSE: 257 MG/DL (ref 70–110)
POCT GLUCOSE: 310 MG/DL (ref 70–110)
POCT GLUCOSE: 352 MG/DL (ref 70–110)
POCT GLUCOSE: 390 MG/DL (ref 70–110)

## 2017-09-26 PROCEDURE — 82330 ASSAY OF CALCIUM: CPT

## 2017-09-26 PROCEDURE — 25000003 PHARM REV CODE 250: Performed by: STUDENT IN AN ORGANIZED HEALTH CARE EDUCATION/TRAINING PROGRAM

## 2017-09-26 PROCEDURE — 25000003 PHARM REV CODE 250: Performed by: OTOLARYNGOLOGY

## 2017-09-26 PROCEDURE — 63600175 PHARM REV CODE 636 W HCPCS: Performed by: OTOLARYNGOLOGY

## 2017-09-26 PROCEDURE — 63600175 PHARM REV CODE 636 W HCPCS: Performed by: NURSE PRACTITIONER

## 2017-09-26 PROCEDURE — 97803 MED NUTRITION INDIV SUBSEQ: CPT

## 2017-09-26 PROCEDURE — 92609 USE OF SPEECH DEVICE SERVICE: CPT

## 2017-09-26 PROCEDURE — 36415 COLL VENOUS BLD VENIPUNCTURE: CPT

## 2017-09-26 PROCEDURE — 63700000 PHARM REV CODE 250 ALT 637 W/O HCPCS: Performed by: STUDENT IN AN ORGANIZED HEALTH CARE EDUCATION/TRAINING PROGRAM

## 2017-09-26 PROCEDURE — 82947 ASSAY GLUCOSE BLOOD QUANT: CPT

## 2017-09-26 PROCEDURE — 84100 ASSAY OF PHOSPHORUS: CPT

## 2017-09-26 PROCEDURE — 83735 ASSAY OF MAGNESIUM: CPT

## 2017-09-26 PROCEDURE — 25000003 PHARM REV CODE 250: Performed by: ANESTHESIOLOGY

## 2017-09-26 PROCEDURE — 86341 ISLET CELL ANTIBODY: CPT

## 2017-09-26 PROCEDURE — 99232 SBSQ HOSP IP/OBS MODERATE 35: CPT | Mod: ,,, | Performed by: NURSE PRACTITIONER

## 2017-09-26 PROCEDURE — 97535 SELF CARE MNGMENT TRAINING: CPT

## 2017-09-26 PROCEDURE — 63600175 PHARM REV CODE 636 W HCPCS: Performed by: ANESTHESIOLOGY

## 2017-09-26 PROCEDURE — 20600001 HC STEP DOWN PRIVATE ROOM

## 2017-09-26 PROCEDURE — 83605 ASSAY OF LACTIC ACID: CPT

## 2017-09-26 PROCEDURE — 84681 ASSAY OF C-PEPTIDE: CPT

## 2017-09-26 RX ORDER — INSULIN ASPART 100 [IU]/ML
2 INJECTION, SOLUTION INTRAVENOUS; SUBCUTANEOUS
Status: DISCONTINUED | OUTPATIENT
Start: 2017-09-27 | End: 2017-09-27

## 2017-09-26 RX ORDER — PANTOPRAZOLE SODIUM 40 MG/10ML
40 INJECTION, POWDER, LYOPHILIZED, FOR SOLUTION INTRAVENOUS DAILY
Status: DISCONTINUED | OUTPATIENT
Start: 2017-09-27 | End: 2017-09-26

## 2017-09-26 RX ORDER — CALCITRIOL 1 UG/ML
0.25 SOLUTION ORAL DAILY
Status: DISCONTINUED | OUTPATIENT
Start: 2017-09-26 | End: 2017-10-01

## 2017-09-26 RX ORDER — INSULIN ASPART 100 [IU]/ML
2 INJECTION, SOLUTION INTRAVENOUS; SUBCUTANEOUS
Status: DISCONTINUED | OUTPATIENT
Start: 2017-09-26 | End: 2017-09-27

## 2017-09-26 RX ORDER — FAMOTIDINE 40 MG/5ML
20 POWDER, FOR SUSPENSION ORAL 2 TIMES DAILY
Status: DISCONTINUED | OUTPATIENT
Start: 2017-09-26 | End: 2017-10-02 | Stop reason: HOSPADM

## 2017-09-26 RX ADMIN — CALCIUM CARBONATE 1000 MG: 1250 SUSPENSION ORAL at 10:09

## 2017-09-26 RX ADMIN — INSULIN ASPART 3 UNITS: 100 INJECTION, SOLUTION INTRAVENOUS; SUBCUTANEOUS at 12:09

## 2017-09-26 RX ADMIN — HYDROMORPHONE HYDROCHLORIDE 1 MG: 1 INJECTION, SOLUTION INTRAMUSCULAR; INTRAVENOUS; SUBCUTANEOUS at 10:09

## 2017-09-26 RX ADMIN — CALCIUM CARBONATE 1000 MG: 1250 SUSPENSION ORAL at 03:09

## 2017-09-26 RX ADMIN — HYDROCODONE BITARTRATE AND ACETAMINOPHEN 15 ML: 7.5; 325 SOLUTION ORAL at 12:09

## 2017-09-26 RX ADMIN — SODIUM CHLORIDE, SODIUM LACTATE, POTASSIUM CHLORIDE, AND CALCIUM CHLORIDE: 600; 310; 30; 20 INJECTION, SOLUTION INTRAVENOUS at 12:09

## 2017-09-26 RX ADMIN — BACITRACIN: 500 OINTMENT TOPICAL at 05:09

## 2017-09-26 RX ADMIN — HYDROMORPHONE HYDROCHLORIDE 1 MG: 1 INJECTION, SOLUTION INTRAMUSCULAR; INTRAVENOUS; SUBCUTANEOUS at 06:09

## 2017-09-26 RX ADMIN — HYDROMORPHONE HYDROCHLORIDE 0.5 MG: 1 INJECTION, SOLUTION INTRAMUSCULAR; INTRAVENOUS; SUBCUTANEOUS at 03:09

## 2017-09-26 RX ADMIN — FAMOTIDINE 20 MG: 40 POWDER, FOR SUSPENSION ORAL at 10:09

## 2017-09-26 RX ADMIN — BACITRACIN: 500 OINTMENT TOPICAL at 10:09

## 2017-09-26 RX ADMIN — INSULIN ASPART 2 UNITS: 100 INJECTION, SOLUTION INTRAVENOUS; SUBCUTANEOUS at 04:09

## 2017-09-26 RX ADMIN — LEVOTHYROXINE SODIUM 112 MCG: 112 TABLET ORAL at 06:09

## 2017-09-26 RX ADMIN — CALCIUM CARBONATE 1000 MG: 1250 SUSPENSION ORAL at 06:09

## 2017-09-26 RX ADMIN — HYDROCODONE BITARTRATE AND ACETAMINOPHEN 15 ML: 7.5; 325 SOLUTION ORAL at 05:09

## 2017-09-26 RX ADMIN — INSULIN ASPART 5 UNITS: 100 INJECTION, SOLUTION INTRAVENOUS; SUBCUTANEOUS at 03:09

## 2017-09-26 RX ADMIN — INSULIN DETEMIR 10 UNITS: 100 INJECTION, SOLUTION SUBCUTANEOUS at 04:09

## 2017-09-26 RX ADMIN — ENOXAPARIN SODIUM 40 MG: 100 INJECTION SUBCUTANEOUS at 05:09

## 2017-09-26 RX ADMIN — CALCITRIOL 0.25 MCG: 1 SOLUTION ORAL at 05:09

## 2017-09-26 NOTE — PLAN OF CARE
Problem: Patient Care Overview  Goal: Plan of Care Review  Outcome: Ongoing (interventions implemented as appropriate)  No acute events overnight. Patient understands and agrees with plan of care, becoming more independent with aleksandra care, educated frequently. Vitals stable. Aleksandra tube in place as well as HME valve. Drain care provided to HUNTER drains x 5. Bilateral neck incisions with staples open to air, right chest incision with staples open to air, antibiotic ointment applied. Additional pain medication ordered per telephone order from Dr. Dale with ENT. Pain controlled on current regimen. Tube feeding at goal of 50mL/hour. IVF at 75mL/hour (IVF + TF = 125mL/hour). Residuals <  50mL, checked every 4 hours. No nausea reported. No BM overnight, passing gas. Urine output adequate overnight. Blood sugars checked every 4 hours, SSI coverage given. Instructed to call for help as needed, call light in reach. Call light answered in person and communication board utilized. Bed in lowest position and brake set, frequent rounds made for safety. See flowsheets for detailed assessment. White board in patient's room updated accordingly. Continuing to monitor.

## 2017-09-26 NOTE — PROGRESS NOTES
[facility]  Adult Nutrition  Progress Note    SUMMARY     Recommendations  Recommendation/Intervention:   1. Current TF not meeting EEN.    -Recommend increasing to Diabetasource AC at 60 mL/hr - to provide 1728 kcal/day, 86 g protein/day, and 1178 mL free fluid/day.   2. If bolus TF desired, recommend Diabetasource AC bolus 300 mL 5x/day (times per patient preference) - to provide 1800 kcal/day, 90 g protein/day, and 1227 mL free fluid/day.    -Recommend 75 mL free water flush before and after each feeding per per MD.   RD to monitor.    Goals: Patient to meet > 85% EEN and EPN  Nutrition Goal Status: progressing towards goal  Communication of RD Recs: discussed on rounds     Reason for Assessment  Reason for Assessment: RD follow-up  Diagnosis: cancer diagnosis/related complications (laryngeal cancer)  Relevent Medical History: DM2, G-tube placement, trach dependence   Interdisciplinary Rounds: attended  General Information Comments: POD#4 s/p laryngectomy, neck dissection, and flap. Patient on continuous TF, tolerating at goal rate.  Nutrition Discharge Planning: Adequate nutrition via TF.    Nutrition Prescription Ordered  Current Diet Order: NPO  Current Nutrition Support Formula Ordered: Diabetisource  Current Nutrition Support Rate Ordered: 50 (ml)  Current Nutrition Support Frequency Ordered: mL/hr      Evaluation of Received Nutrients/Fluid Intake  Enteral Calories (kcal): 1440  Enteral Protein (gm): 72  Enteral (Free Water) Fluid (mL): 982  Energy Calories Required: not meeting needs  % Kcal Needs: 80  Protein Required: meeting needs  % Protein Needs: 92  IV Fluid (mL): 1800  Total Fluid Intake (mL): 2782  I/O: Noted  Fluid Required: exceed needs  Comments: LBM 9/23  Tolerance: tolerating  % Intake of Estimated Energy Needs: 75 - 100 %  % Meal Intake: NPO     Nutrition Risk Screen   Nutrition Risk Screen: tube feeding or parenteral nutrition    Nutrition/Diet History  Patient Reported  "Diet/Restrictions/Preferences: other (see comments) (BRADY)  Food Preferences: No cultural or Hoahaoism needs identified at this time.  Factors Affecting Nutritional Intake: NPO    Labs/Tests/Procedures/Meds   Pertinent Labs Reviewed: reviewed  Pertinent Labs Comments: Glu 125, POCT Glu 115-390, HgbA1c 9.6, Ca 8.5, Mag 1.2, Alb 2.0  Pertinent Medications Reviewed: reviewed  Pertinent Medications Comments: calcitriol, calcum carbonate, pantoprazole, IVF    Physical Findings  Overall Physical Appearance: underweight, on oxygen therapy (trach collar)  Tubes: gastrostomy tube  Oral/Mouth Cavity: tooth/teeth missing  Skin: edema, incision, non-healing wound(s) (diabetic toe ulcer)    Anthropometrics  Height: 5' 11" (180.3 cm)  Weight Method: Bed Scale  Weight: 60 kg (132 lb 4.4 oz)  Ideal Body Weight (IBW), Male: 172 lb  % Ideal Body Weight, Male (lb): 76.91 lb  BMI (Calculated): 18.5  BMI Grade: 18.5-24.9 - normal    Estimated/Assessed Needs  Weight Used For Calorie Calculations: 60 kg (132 lb 4.4 oz)   Energy Calorie Requirements (kcal): 5291-7687 kcal/d  Energy Need Method: Kcal/kg (30-35 kcal/kg)  RMR ([place]-St. Jeor Equation): 1492.12  Weight Used For Protein Calculations: 60 kg (132 lb 4.4 oz)  Protein Requirements: 78-90 g/d (1.3-1.5 g/kg)  Fluid Requirements (mL): 1 mL/kcal or per MD  Fluid Need Method: RDA Method  RDA Method (mL): 1800  CHO Requirement: 50% total kcals     Assessment and Plan  * Laryngeal cancer    Nutrition Problem  Inadequate energy intake    Related to (etiology):   Decreased ability to consume sufficient energy    Signs and Symptoms (as evidenced by):   NPO with no alternative means of nutriton    Interventions/Recommendations (treatment strategy):  See RD recs above.    Nutrition Diagnosis Status:   New          Monitor and Evaluation  Food and Nutrient Intake: energy intake, enteral nutrition intake  Food and Nutrient Adminstration: diet order, enteral and parenteral nutrition " administration  Physical Activity and Function: nutrition-related ADLs and IADLs  Anthropometric Measurements: weight, weight change  Biochemical Data, Medical Tests and Procedures: electrolyte and renal panel, gastrointestinal profile, glucose/endocrine profile, inflammatory profile, lipid profile  Nutrition-Focused Physical Findings: overall appearance    Nutrition Risk  Level of Risk:  (2x/week)    Nutrition Follow-Up  RD Follow-up?: Yes

## 2017-09-26 NOTE — ASSESSMENT & PLAN NOTE
BG goal 140-180. FBG significantly elevated after discontinuation of low dose insulin infusion - will r/o Type 1 DM.   Start levemir 10 units daily (can eventually change timing to nightly to match home schedule)  Start Novolog 2 units q4h with TF, to be held if TF off or if BG <100  BG q4h with low dose correction scale.     9/26 Cpeptide, random glucose, and MINO to r/o Type 1, ARIELLE    Dispo: TBD  Has Lantus and Novolog supply  F/u with PCP

## 2017-09-26 NOTE — PLAN OF CARE
Problem: SLP Goal  Goal: SLP Goal  Speech Language Pathology Goals  Goals expected to be met by 10/1    1. Pt/family will actively participate in post-laryngectomy education to facilitate [place] and desensitization  2. Pt will communicate contextualized phrases via AL with approximately 60% intelligibility provided min cues to improve communication.  3. Pt/family will perform stoma care x1 per session provided min cues to facilitate Douglas.                Pt with slow progress towards goals     Anne Marie Solis MS, CCC-SLP  Speech Language Pathologist  Pager: [phone]  Date 9/26/2017

## 2017-09-26 NOTE — PT/OT/SLP PROGRESS
Speech Language Pathology  Treatment    Luís Esteban   MRN: [id number]   Admitting Diagnosis: Laryngeal cancer    Diet recommendations: Solid Diet Level: NPO  Liquid Diet Level: NPO    · Diet to be advanced by medical team   · Please encourage stoma care  · Please encourage AL practice to enhance communication needs     SLP Treatment Date: 09/26/17  Speech Start Time: 0210     Speech Stop Time: 0227     Speech Total (min): 17 min       TREATMENT BILLABLE MINUTES:  Therapeutic Speech Device 9 and Seld Care/Home Management Training 8    Has the patient been evaluated by SLP for swallowing? :  (NPO per MD team to be advanced by ENT )      General Precautions: Standard, aspiration, fall  Current Respiratory Status: laryngectomy (neck breather)       Subjective:  Pt awake and alert in bed    Pain/Comfort  Pain Rating 1: 0/10  Pain Rating Post-Intervention 1: 0/10    Objective:    initially Pt refusing skilled speech services as Pt reports RT recently completed stoma care. Pt requesting SLP return tomorrow. SLP provided extensive education re: importance of Pt ability to independently complete stoma care. Pt agreeable to SLP completing stoma care yet for ongoing education yet remained uninterested in completing independently at this time. Pt did demonstrate adequate ability to remove and assess HME cartridge. SLP removed neck ties and aleksandra tube which was visibly soiled. educated to Pt importance of cleaning and demonstrated steps warranted to clean aleksandra tube. SLP replaced aleksandra tube, neck ties and clear HME. Pt demonstrated understanding of steps needed for stoma care and in agreement re-attempt independently with SLP tomorrows date.  Pt recalled 3/3 strategies for functional AL use including non-dominant hand use, short phrases (2-5 words at a time) and overarticulation. Pt independently self-correcting with intraoral AL placement. Pt counted 1-10 with 80% AL intelligibility. Pt otherwise communicating wants/needs  successfully via mouthing of words, gestures and facial expressions. Ongoing speech services and support warranted.       Assessment:  Luís Esteban is a 47 y.o. male with a medical diagnosis of Laryngeal cancer and seen for ongoing AL use, stoma care and post-laryngectomy education.     Discharge recommendations: Discharge Facility/Level Of Care Needs: home with  ST     Goals:    SLP Goals        Problem: SLP Goal    Goal Priority Disciplines Outcome   SLP Goal     SLP    Description:  Speech Language Pathology Goals  Goals expected to be met by 10/1    1. Pt/family will actively participate in post-laryngectomy education to facilitate Sullivan and desensitization  2. Pt will communicate contextualized phrases via AL with approximately 60% intelligibility provided min cues to improve communication.  3. Pt/family will perform stoma care x1 per session provided min cues to facilitate Sullivan.                                 Plan:   Patient to be seen Therapy Frequency: 5 x/week   Plan of Care expires: 10/23/17  Plan of Care reviewed with: patient  SLP Follow-up?: Yes              Anne Marie Solis CCC-SLP  09/26/2017

## 2017-09-26 NOTE — PROGRESS NOTES
"[facility]  Endocrinology  Progress Note    Admit Date: 9/22/2017     Reason for Consult: Management of T2DM, Hyperglycemia      Surgical Procedure and Date:   PEG/trach 8/30/17  total laryngectomy, bilateral neck dissection (II-IV, VI), total thyroidectomy 9/22/17     Diabetes diagnosis year: 2009     Home Diabetes Medications:    lantus 20 units nightly  novolog 4 units for TF boluses (Diabetisource 5x/day)  novolog 6 units for meals   novolog 10 units for TF bolus + meal      How often checking glucose at home? 1-3 x day   BG readings on regimen: ?  Hypoglycemia on the regimen? ?  Missed doses on regimen?  ?     Diabetes Complications include:     Hyperglycemia and Diabetic peripheral neuropathy      Complicating diabetes co morbidities:   Active Cancer    Hemoglobin A1C   Date Value Ref Range Status   09/03/2017 9.6 (H) 4.0 - 5.6 % Final         HPI:   Patient is a 47 y.o. male with a diagnosis of uncontrolled T2DM, s/p laryngectomy for SCC of larynx on 9/22, postsurgical hypothyroidism, S/p trach and peg, on enteral nutrition and diet as noted in above regimen at home. Endo consulted for post-op hyperglycemia.     Interval HPI:   Overnight events: BG above goal overnight and this AM,  TF @ 50 ml/hr  Eating:   NPO  Nausea: No  Hypoglycemia and intervention: No  Fever: No  TPN and/or TF: Yes  If yes, type of TF/TPN and rate: Diabetasource @ 50 ml/hr    /74 (BP Location: Left arm, Patient Position: Lying)   Pulse 64   Temp 97.6 °F (36.4 °C) (Oral)   Resp 20   Ht 5' 11" (1.803 m)   Wt 60 kg (132 lb 4.4 oz)   SpO2 95%   BMI 18.45 kg/m²       Labs Reviewed and Include    No results for input(s): GLU, CALCIUM, ALBUMIN, PROT, NA, K, CO2, CL, BUN, CREATININE, ALKPHOS, ALT, AST, BILITOT in the last 24 hours.  Lab Results   Component Value Date    WBC 13.16 (H) 09/25/2017    HGB 8.8 (L) 09/25/2017    HCT 26.1 (L) 09/25/2017    MCV 92 09/25/2017     (H) 09/25/2017     No results for " input(s): TSH, FREET4 in the last 168 hours.  Lab Results   Component Value Date    HGBA1C 9.6 (H) 09/03/2017       Nutritional status:   Body mass index is 18.45 kg/m².  Lab Results   Component Value Date    ALBUMIN 2.0 (L) 09/25/2017    ALBUMIN 2.1 (L) 09/24/2017    ALBUMIN 2.2 (L) 09/22/2017     No results found for: PREALBUMIN    Estimated Creatinine Clearance: 109.5 mL/min (based on SCr of 0.7 mg/dL).    Accu-Checks  Recent Labs      09/25/17   0002  09/25/17   0202  09/25/17   0411  09/25/17   0732  09/25/17   0745  09/25/17   0817  09/25/17   1223  09/25/17   1632  09/26/17   0025  09/26/17   0439   POCTGLUCOSE  174*  154*  142*  84  96  115*  251*  176*  390*  310*       Current Medications and/or Treatments Impacting Glycemic Control  Immunotherapy:    Immunosuppressants     None        Steroids:   Hormones     None        Pressors:    Autonomic Drugs     None        Hyperglycemia/Diabetes Medications:   Antihyperglycemics     Start     Stop Route Frequency Ordered    09/25/17 1200  insulin aspart pen 0-5 Units      -- SubQ Every 4 hours PRN 09/25/17 0834          ASSESSMENT and PLAN    * Laryngeal cancer    Per ENT           Uncontrolled type 2 diabetes mellitus with hyperglycemia, with long-term current use of insulin    BG goal 140-180. FBG significantly elevated after discontinuation of low dose insulin infusion - will r/o Type 1 DM.   Start levemir 10 units daily (can eventually change timing to nightly to match home schedule)  Start Novolog 2 units q4h with TF, to be held if TF off or if BG <100  BG q4h with low dose correction scale.     9/26 Cpeptide, random glucose, and MINO to r/o Type 1, ARIELLE    Dispo: TBD  Has Lantus and Novolog supply  F/u with PCP          Hypothyroidism, postsurgical    On Levothyroxine 112 mcg daily.   Check FT4 in 4 weeks (~10/22)        On enteral nutrition    May increase insulin needs  TF goal 50 ml/hr              Hazel Acevedo NP  Endocrinology  [facility]    ADDENDUM 1600: Spoke to bedside RN, Levemir dose not given yet, instructed to give Levemir dose ASAP. Reviewed labs ordered this AM, MINO pending but Cpeptide <0.08 with BG >200, diagnosis changed. Bedside RN to give Levemir dose NOW

## 2017-09-26 NOTE — ASSESSMENT & PLAN NOTE
BG goal 140-180. Low C-peptide, now T1DM. + hypoglycemia overnight, likely too much basal insulin.   BG checked at 10AM- 278. Decrease Levemir dose to 3 units BID, starting this AM.  BG q4h with low dose correction scale.   Will hold scheduled Novolog for now, as he missed a dose last night at 8PM with TF infusing and experienced hypoglycemia overnight.   MINO pending.     ADDENDUM 1300: , TF infusing. He received Levemir 3 units this AM.   Start Novolog 2 units q4h with TF, to be held if TF off or if BG <100    9/26 Cpeptide < 0.08 with - now T1DM.       Dispo: TBD. Dependent on nutritional needs.   Has Lantus and Novolog supply  Recommend following up with endocrine for long term management.

## 2017-09-26 NOTE — PROGRESS NOTES
[facility]  Otorhinolaryngology-Head & Neck Surgery  Progress Note    Subjective:     Post-Op Info:  Procedure(s) (LRB):  DISSECTION-NECK (Right)  LARYNGOSCOPY (N/A)  FLAP-ROTATION (N/A)  LARYNGECTOMY (N/A)  DISSECTION-NECK (Left)   4 Days Post-Op  Hospital Day: 5     Interval History: No longer on insulin drip.     Medications:  Continuous Infusions:   lactated ringers 75 mL/hr at 09/25/17 1700     Scheduled Meds:   bacitracin   Topical (Top) TID    calcitRIOL  0.25 mcg Per G Tube Daily    calcium carbonate  1,000 mg Per G Tube TID    enoxaparin  40 mg Subcutaneous Daily    levothyroxine  112 mcg Per G Tube Before breakfast    pantoprazole  40 mg Per NG tube Daily     PRN Meds:calcium gluconate IVPB, calcium gluconate IVPB, calcium gluconate IVPB, dextrose 50%, diphenhydrAMINE, glucagon (human recombinant), hydrALAZINE, hydrocodone-apap 7.5-325 MG/15 ML, HYDROmorphone, HYDROmorphone, influenza, insulin aspart, magnesium sulfate IVPB, magnesium sulfate IVPB, ondansetron, potassium chloride **AND** potassium chloride **AND** potassium chloride, promethazine (PHENERGAN) IVPB     Review of patient's allergies indicates:  No Known Allergies  Objective:     Vital Signs (24h Range):  Temp:  [97.6 °F (36.4 °C)-98.5 °F (36.9 °C)] 97.6 °F (36.4 °C)  Pulse:  [62-80] 64  Resp:  [11-69] 20  SpO2:  [91 %-100 %] 95 %  BP: (112-147)/(69-81) 125/74        Lines/Drains/Airways     Drain                 Gastrostomy/Enterostomy 08/31/17 1900 25 days         Closed/Suction Drain 09/22/17 1708 Medial Neck Bulb 15 Fr. 3 days         Closed/Suction Drain 09/22/17 1708 Right Neck Bulb 15 Fr. 3 days         Closed/Suction Drain 09/22/17 1709 Left Neck Bulb 15 Fr. 3 days         Closed/Suction Drain 09/22/17 1709 Right Chest Bulb 15 Fr. 3 days         Closed/Suction Drain 09/22/17 1710 Right Chest Bulb 15 Fr. 3 days          Airway                 Surgical Airway 09/24/17 1300 1 day          Peripheral Intravenous  Line                 Peripheral IV - Single Lumen 09/25/17 0500 Left Antecubital 1 day                Physical Exam  Lying supine in bed  Breathing comfortably with humidified oxygen via aleksandra tube  Laryngectomy tube removed and cleaned  Stoma healthy   Right chest incision without fluid collection, erythema, tenderness    Drains  R Neck 25,   Medial Neck 20,   L Neck35,   R chest 64, 58    Significant Labs:  Gluc 310, 390, 176  BMP:   Recent Labs  Lab 09/25/17  0402 09/26/17  0450   *  --    CL 99  --    CO2 31*  --    BUN 18  --    CREATININE 0.7  --    CALCIUM 8.5*  --    MG  --  1.2*     CBC:   Recent Labs  Lab 09/25/17  0402   WBC 13.16*   RBC 2.84*   HGB 8.8*   HCT 26.1*   *   MCV 92   MCH 31.0   MCHC 33.7       Significant Diagnostics:  None    Assessment/Plan:     * Laryngeal cancer    47 y.o. male with laryngeal SCCA   POD 4 total laryngectomy,  bilateral neck dissections (II-IV, VI), total thyroidectomy, R pectoralis major flap .   -advance to day 4 of the pathway  - keep JPs   - bacitracin to wounds  - aleksandra tube in stoma with humidified oxygen or HME  - consulted Hem/Onc Psychology ([place])  - HDS; hydralazine prn  - cont Unasyn empiric post op coverage   - strict NPO   -consider esophagram POD 7-10 per Dr. Rose/Pancho  -continuous TF via G tube per pathway              -Diabetasource AC goal of 50 cc/hr  - Replace lytes PRN  - Endo: postop PTH low, q8 ICa   rocaltrol, calcium carbonate; replacement c IV prn  -synthroid 55 mcg IV started  - appreciate endocrine recs/mgmt of hyperglycemia  - Pt/OT; OOB, PPI, Lovenox  - Case mgmt consulted for d/c planning            Kayleigh Patel MD  Otorhinolaryngology-Head & Neck Surgery  [facility]

## 2017-09-26 NOTE — SUBJECTIVE & OBJECTIVE
"Interval HPI:   Overnight events: BG above goal overnight and this AM,  TF @ 50 ml/hr  Eating:   NPO  Nausea: No  Hypoglycemia and intervention: No  Fever: No  TPN and/or TF: Yes  If yes, type of TF/TPN and rate: Diabetasource @ 50 ml/hr    /74 (BP Location: Left arm, Patient Position: Lying)   Pulse 64   Temp 97.6 °F (36.4 °C) (Oral)   Resp 20   Ht 5' 11" (1.803 m)   Wt 60 kg (132 lb 4.4 oz)   SpO2 95%   BMI 18.45 kg/m²     Labs Reviewed and Include    No results for input(s): GLU, CALCIUM, ALBUMIN, PROT, NA, K, CO2, CL, BUN, CREATININE, ALKPHOS, ALT, AST, BILITOT in the last 24 hours.  Lab Results   Component Value Date    WBC 13.16 (H) 09/25/2017    HGB 8.8 (L) 09/25/2017    HCT 26.1 (L) 09/25/2017    MCV 92 09/25/2017     (H) 09/25/2017     No results for input(s): TSH, FREET4 in the last 168 hours.  Lab Results   Component Value Date    HGBA1C 9.6 (H) 09/03/2017       Nutritional status:   Body mass index is 18.45 kg/m².  Lab Results   Component Value Date    ALBUMIN 2.0 (L) 09/25/2017    ALBUMIN 2.1 (L) 09/24/2017    ALBUMIN 2.2 (L) 09/22/2017     No results found for: PREALBUMIN    Estimated Creatinine Clearance: 109.5 mL/min (based on SCr of 0.7 mg/dL).    Accu-Checks  Recent Labs      09/25/17   0002  09/25/17   0202  09/25/17   0411  09/25/17   0732  09/25/17   0745  09/25/17   0817  09/25/17   1223  09/25/17   1632  09/26/17   0025  09/26/17   0439   POCTGLUCOSE  174*  154*  142*  84  96  115*  251*  176*  390*  310*       Current Medications and/or Treatments Impacting Glycemic Control  Immunotherapy:    Immunosuppressants     None        Steroids:   Hormones     None        Pressors:    Autonomic Drugs     None        Hyperglycemia/Diabetes Medications:   Antihyperglycemics     Start     Stop Route Frequency Ordered    09/25/17 1200  insulin aspart pen 0-5 Units      -- SubQ Every 4 hours PRN 09/25/17 0834        "

## 2017-09-26 NOTE — SUBJECTIVE & OBJECTIVE
Interval History: No longer on insulin drip.     Medications:  Continuous Infusions:   lactated ringers 75 mL/hr at 09/25/17 1700     Scheduled Meds:   bacitracin   Topical (Top) TID    calcitRIOL  0.25 mcg Per G Tube Daily    calcium carbonate  1,000 mg Per G Tube TID    enoxaparin  40 mg Subcutaneous Daily    levothyroxine  112 mcg Per G Tube Before breakfast    pantoprazole  40 mg Per NG tube Daily     PRN Meds:calcium gluconate IVPB, calcium gluconate IVPB, calcium gluconate IVPB, dextrose 50%, diphenhydrAMINE, glucagon (human recombinant), hydrALAZINE, hydrocodone-apap 7.5-325 MG/15 ML, HYDROmorphone, HYDROmorphone, influenza, insulin aspart, magnesium sulfate IVPB, magnesium sulfate IVPB, ondansetron, potassium chloride **AND** potassium chloride **AND** potassium chloride, promethazine (PHENERGAN) IVPB     Review of patient's allergies indicates:  No Known Allergies  Objective:     Vital Signs (24h Range):  Temp:  [97.6 °F (36.4 °C)-98.5 °F (36.9 °C)] 97.6 °F (36.4 °C)  Pulse:  [62-80] 64  Resp:  [11-69] 20  SpO2:  [91 %-100 %] 95 %  BP: (112-147)/(69-81) 125/74        Lines/Drains/Airways     Drain                 Gastrostomy/Enterostomy 08/31/17 1900 25 days         Closed/Suction Drain 09/22/17 1708 Medial Neck Bulb 15 Fr. 3 days         Closed/Suction Drain 09/22/17 1708 Right Neck Bulb 15 Fr. 3 days         Closed/Suction Drain 09/22/17 1709 Left Neck Bulb 15 Fr. 3 days         Closed/Suction Drain 09/22/17 1709 Right Chest Bulb 15 Fr. 3 days         Closed/Suction Drain 09/22/17 1710 Right Chest Bulb 15 Fr. 3 days          Airway                 Surgical Airway 09/24/17 1300 1 day          Peripheral Intravenous Line                 Peripheral IV - Single Lumen 09/25/17 0500 Left Antecubital 1 day                Physical Exam  Lying supine in bed  Breathing comfortably with humidified oxygen via aleksandra tube  Laryngectomy tube removed and cleaned  Stoma healthy   Right chest incision without fluid  collection, erythema, tenderness    Drains  R Neck 25,   Medial Neck 20,   L Neck35,   R chest 64, 58    Significant Labs:  Gluc 310, 390, 176  BMP:   Recent Labs  Lab 09/25/17  0402 09/26/17  0450   *  --    CL 99  --    CO2 31*  --    BUN 18  --    CREATININE 0.7  --    CALCIUM 8.5*  --    MG  --  1.2*     CBC:   Recent Labs  Lab 09/25/17  0402   WBC 13.16*   RBC 2.84*   HGB 8.8*   HCT 26.1*   *   MCV 92   MCH 31.0   MCHC 33.7       Significant Diagnostics:  None

## 2017-09-26 NOTE — ASSESSMENT & PLAN NOTE
47 y.o. male with laryngeal SCCA   POD 4 total laryngectomy,  bilateral neck dissections (II-IV, VI), total thyroidectomy, R pectoralis major flap .   -advance to day 4 of the pathway  - keep JPs   - bacitracin to wounds  - aleksandra tube in stoma with humidified oxygen or HME  - consulted Hem/Onc Psychology ([place])  - HDS; hydralazine prn  - cont Unasyn empiric post op coverage   - strict NPO   -consider esophagram POD 7-10 per Dr. Rose/Pancho  -continuous TF via G tube per pathway              -Diabetasource AC goal of 50 cc/hr  - Replace lytes PRN  - Endo: postop PTH low, q8 ICa   rocaltrol, calcium carbonate; replacement c IV prn  -synthroid 55 mcg IV started  - appreciate endocrine recs/mgmt of hyperglycemia  - Pt/OT; OOB, PPI, Lovenox  - Case mgmt consulted for d/c planning

## 2017-09-26 NOTE — PLAN OF CARE
Problem: Patient Care Overview  Goal: Plan of Care Review  Outcome: Ongoing (interventions implemented as appropriate)  Plan of care reviewed with patient, nods head in understanding. AAOx4, VSS. Transverse neck incision CDI. HUNTER drains x5 intact. Brandy tube with HME cap in p[lace. Tube feeds currently running at 30ml/hr. Patient currently reports no pain. Patient acknowledges no other needs at this time, call light in reach, TM.

## 2017-09-26 NOTE — NURSING TRANSFER
Nursing Transfer Note      9/25/2017     Transfer From: ICU    Transfer via wheelchair    Transfer with  to O2, cardiac monitoring    Transported by RN    Medicines sent: Yes    Chart send with patient: Yes    Notified: friend    Patient reassessed at: 1825 on 9/25/2017    Upon arrival to floor: patient oriented to room, call bell in reach and bed in lowest position

## 2017-09-26 NOTE — ASSESSMENT & PLAN NOTE
Nutrition Problem  Inadequate energy intake    Related to (etiology):   Decreased ability to consume sufficient energy    Signs and Symptoms (as evidenced by):   NPO with no alternative means of nutriton    Interventions/Recommendations (treatment strategy):  See RD recs above.    Nutrition Diagnosis Status:   New

## 2017-09-27 PROBLEM — E10.649 HYPOGLYCEMIA DUE TO TYPE 1 DIABETES MELLITUS: Status: ACTIVE | Noted: 2017-09-27

## 2017-09-27 PROBLEM — E10.65 TYPE 1 DIABETES MELLITUS WITH HYPERGLYCEMIA: Chronic | Status: ACTIVE | Noted: 2017-09-27

## 2017-09-27 LAB
ALBUMIN SERPL BCP-MCNC: 2.1 G/DL
ALP SERPL-CCNC: 68 U/L
ALT SERPL W/O P-5'-P-CCNC: 17 U/L
ANION GAP SERPL CALC-SCNC: 6 MMOL/L
AST SERPL-CCNC: 20 U/L
BILIRUB SERPL-MCNC: 0.3 MG/DL
BUN SERPL-MCNC: 15 MG/DL
CA-I BLDV-SCNC: 0.98 MMOL/L
CALCIUM SERPL-MCNC: 7.2 MG/DL
CHLORIDE SERPL-SCNC: 92 MMOL/L
CO2 SERPL-SCNC: 35 MMOL/L
CREAT SERPL-MCNC: 0.7 MG/DL
EST. GFR  (AFRICAN AMERICAN): >60 ML/MIN/1.73 M^2
EST. GFR  (NON AFRICAN AMERICAN): >60 ML/MIN/1.73 M^2
GLUCOSE SERPL-MCNC: 197 MG/DL
LACTATE SERPL-SCNC: 0.6 MMOL/L
MAGNESIUM SERPL-MCNC: 1.2 MG/DL
PHOSPHATE SERPL-MCNC: 2.7 MG/DL
POCT GLUCOSE: 101 MG/DL (ref 70–110)
POCT GLUCOSE: 215 MG/DL (ref 70–110)
POCT GLUCOSE: 278 MG/DL (ref 70–110)
POCT GLUCOSE: 355 MG/DL (ref 70–110)
POCT GLUCOSE: 386 MG/DL (ref 70–110)
POCT GLUCOSE: 47 MG/DL (ref 70–110)
POCT GLUCOSE: 56 MG/DL (ref 70–110)
POCT GLUCOSE: 77 MG/DL (ref 70–110)
POCT GLUCOSE: 85 MG/DL (ref 70–110)
POTASSIUM SERPL-SCNC: 4.2 MMOL/L
PROT SERPL-MCNC: 6.1 G/DL
SODIUM SERPL-SCNC: 133 MMOL/L

## 2017-09-27 PROCEDURE — 84100 ASSAY OF PHOSPHORUS: CPT

## 2017-09-27 PROCEDURE — 36415 COLL VENOUS BLD VENIPUNCTURE: CPT

## 2017-09-27 PROCEDURE — 83605 ASSAY OF LACTIC ACID: CPT

## 2017-09-27 PROCEDURE — 20600001 HC STEP DOWN PRIVATE ROOM

## 2017-09-27 PROCEDURE — 83735 ASSAY OF MAGNESIUM: CPT

## 2017-09-27 PROCEDURE — 25000003 PHARM REV CODE 250: Performed by: NURSE PRACTITIONER

## 2017-09-27 PROCEDURE — 82330 ASSAY OF CALCIUM: CPT

## 2017-09-27 PROCEDURE — 63600175 PHARM REV CODE 636 W HCPCS: Performed by: OTOLARYNGOLOGY

## 2017-09-27 PROCEDURE — 25000003 PHARM REV CODE 250: Performed by: STUDENT IN AN ORGANIZED HEALTH CARE EDUCATION/TRAINING PROGRAM

## 2017-09-27 PROCEDURE — 25000003 PHARM REV CODE 250: Performed by: OTOLARYNGOLOGY

## 2017-09-27 PROCEDURE — 99233 SBSQ HOSP IP/OBS HIGH 50: CPT | Mod: ,,, | Performed by: NURSE PRACTITIONER

## 2017-09-27 PROCEDURE — 25000003 PHARM REV CODE 250

## 2017-09-27 PROCEDURE — 63600175 PHARM REV CODE 636 W HCPCS: Performed by: NURSE PRACTITIONER

## 2017-09-27 PROCEDURE — 94761 N-INVAS EAR/PLS OXIMETRY MLT: CPT

## 2017-09-27 PROCEDURE — 80053 COMPREHEN METABOLIC PANEL: CPT

## 2017-09-27 PROCEDURE — 63600175 PHARM REV CODE 636 W HCPCS: Performed by: ANESTHESIOLOGY

## 2017-09-27 PROCEDURE — 27000221 HC OXYGEN, UP TO 24 HOURS

## 2017-09-27 PROCEDURE — 63700000 PHARM REV CODE 250 ALT 637 W/O HCPCS: Performed by: STUDENT IN AN ORGANIZED HEALTH CARE EDUCATION/TRAINING PROGRAM

## 2017-09-27 RX ORDER — INSULIN ASPART 100 [IU]/ML
2 INJECTION, SOLUTION INTRAVENOUS; SUBCUTANEOUS
Status: DISCONTINUED | OUTPATIENT
Start: 2017-09-27 | End: 2017-09-28

## 2017-09-27 RX ORDER — INSULIN ASPART 100 [IU]/ML
2 INJECTION, SOLUTION INTRAVENOUS; SUBCUTANEOUS
Status: DISCONTINUED | OUTPATIENT
Start: 2017-09-28 | End: 2017-09-28

## 2017-09-27 RX ORDER — INSULIN ASPART 100 [IU]/ML
2 INJECTION, SOLUTION INTRAVENOUS; SUBCUTANEOUS
Status: DISCONTINUED | OUTPATIENT
Start: 2017-09-27 | End: 2017-09-27

## 2017-09-27 RX ORDER — LANOLIN ALCOHOL/MO/W.PET/CERES
400 CREAM (GRAM) TOPICAL 2 TIMES DAILY
Status: COMPLETED | OUTPATIENT
Start: 2017-09-27 | End: 2017-09-29

## 2017-09-27 RX ADMIN — HYDROCODONE BITARTRATE AND ACETAMINOPHEN 15 ML: 7.5; 325 SOLUTION ORAL at 06:09

## 2017-09-27 RX ADMIN — CALCIUM CARBONATE 1000 MG: 1250 SUSPENSION ORAL at 09:09

## 2017-09-27 RX ADMIN — CALCIUM CARBONATE 1000 MG: 1250 SUSPENSION ORAL at 02:09

## 2017-09-27 RX ADMIN — FAMOTIDINE 20 MG: 40 POWDER, FOR SUSPENSION ORAL at 08:09

## 2017-09-27 RX ADMIN — DEXTROSE MONOHYDRATE 12.5 G: 25 INJECTION, SOLUTION INTRAVENOUS at 12:09

## 2017-09-27 RX ADMIN — BACITRACIN: 500 OINTMENT TOPICAL at 02:09

## 2017-09-27 RX ADMIN — HYDROMORPHONE HYDROCHLORIDE 1 MG: 1 INJECTION, SOLUTION INTRAMUSCULAR; INTRAVENOUS; SUBCUTANEOUS at 08:09

## 2017-09-27 RX ADMIN — INSULIN DETEMIR 3 UNITS: 100 INJECTION, SOLUTION SUBCUTANEOUS at 10:09

## 2017-09-27 RX ADMIN — INSULIN ASPART 5 UNITS: 100 INJECTION, SOLUTION INTRAVENOUS; SUBCUTANEOUS at 01:09

## 2017-09-27 RX ADMIN — SODIUM CHLORIDE, SODIUM LACTATE, POTASSIUM CHLORIDE, AND CALCIUM CHLORIDE: 600; 310; 30; 20 INJECTION, SOLUTION INTRAVENOUS at 08:09

## 2017-09-27 RX ADMIN — FAMOTIDINE 20 MG: 40 POWDER, FOR SUSPENSION ORAL at 09:09

## 2017-09-27 RX ADMIN — BACITRACIN: 500 OINTMENT TOPICAL at 09:09

## 2017-09-27 RX ADMIN — HYDROCODONE BITARTRATE AND ACETAMINOPHEN 15 ML: 7.5; 325 SOLUTION ORAL at 09:09

## 2017-09-27 RX ADMIN — CALCIUM CARBONATE 1000 MG: 1250 SUSPENSION ORAL at 06:09

## 2017-09-27 RX ADMIN — Medication 400 MG: at 12:09

## 2017-09-27 RX ADMIN — DEXTROSE MONOHYDRATE 12.5 G: 25 INJECTION, SOLUTION INTRAVENOUS at 04:09

## 2017-09-27 RX ADMIN — INSULIN DETEMIR 3 UNITS: 100 INJECTION, SOLUTION SUBCUTANEOUS at 09:09

## 2017-09-27 RX ADMIN — SODIUM CHLORIDE, SODIUM LACTATE, POTASSIUM CHLORIDE, AND CALCIUM CHLORIDE: 600; 310; 30; 20 INJECTION, SOLUTION INTRAVENOUS at 05:09

## 2017-09-27 RX ADMIN — Medication 400 MG: at 09:09

## 2017-09-27 RX ADMIN — BACITRACIN: 500 OINTMENT TOPICAL at 06:09

## 2017-09-27 RX ADMIN — CALCITRIOL 0.25 MCG: 1 SOLUTION ORAL at 10:09

## 2017-09-27 RX ADMIN — INSULIN ASPART 2 UNITS: 100 INJECTION, SOLUTION INTRAVENOUS; SUBCUTANEOUS at 01:09

## 2017-09-27 RX ADMIN — INSULIN ASPART 2 UNITS: 100 INJECTION, SOLUTION INTRAVENOUS; SUBCUTANEOUS at 05:09

## 2017-09-27 RX ADMIN — ENOXAPARIN SODIUM 40 MG: 100 INJECTION SUBCUTANEOUS at 04:09

## 2017-09-27 RX ADMIN — LEVOTHYROXINE SODIUM 112 MCG: 112 TABLET ORAL at 06:09

## 2017-09-27 RX ADMIN — INSULIN ASPART 5 UNITS: 100 INJECTION, SOLUTION INTRAVENOUS; SUBCUTANEOUS at 05:09

## 2017-09-27 NOTE — PROGRESS NOTES
[facility]  Otorhinolaryngology-Head & Neck Surgery  Progress Note    Subjective:     Post-Op Info:  Procedure(s) (LRB):  DISSECTION-NECK (Right)  LARYNGOSCOPY (N/A)  FLAP-ROTATION (N/A)  LARYNGECTOMY (N/A)  DISSECTION-NECK (Left)   5 Days Post-Op  Hospital Day: 6     Interval History: No longer on insulin drip. Hypoglycemeia last pm; No resp issues; pain controlled; doing well on pathway    Medications:  Continuous Infusions:   lactated ringers 65 mL/hr at 09/26/17 1819     Scheduled Meds:   bacitracin   Topical (Top) TID    calcitriol  0.25 mcg Oral Daily    calcium carbonate  1,000 mg Per G Tube TID    enoxaparin  40 mg Subcutaneous Daily    famotidine  20 mg Per G Tube BID    insulin aspart  2 Units Subcutaneous Q24H    insulin aspart  2 Units Subcutaneous Q24H    insulin aspart  2 Units Subcutaneous Q24H    insulin aspart  2 Units Subcutaneous Q24H    insulin aspart  2 Units Subcutaneous Q24H    insulin aspart  2 Units Subcutaneous Q24H    insulin detemir  10 Units Subcutaneous Daily    levothyroxine  112 mcg Per G Tube Before breakfast     PRN Meds:calcium gluconate IVPB, calcium gluconate IVPB, calcium gluconate IVPB, dextrose 50%, diphenhydrAMINE, glucagon (human recombinant), hydrALAZINE, hydrocodone-apap 7.5-325 MG/15 ML, HYDROmorphone, HYDROmorphone, influenza, insulin aspart, magnesium sulfate IVPB, magnesium sulfate IVPB, ondansetron, potassium chloride **AND** potassium chloride **AND** potassium chloride, promethazine (PHENERGAN) IVPB     Review of patient's allergies indicates:  No Known Allergies  Objective:     Vital Signs (24h Range):  Temp:  [96.4 °F (35.8 °C)-97.6 °F (36.4 °C)] 96.4 °F (35.8 °C)  Pulse:  [64-78] 73  Resp:  [12-18] 16  SpO2:  [94 %-99 %] 98 %  BP: (117-135)/(75-83) 117/75        Lines/Drains/Airways     Drain                 Gastrostomy/Enterostomy 08/31/17 1900 26 days         Closed/Suction Drain 09/22/17 1708 Medial Neck Bulb 15 Fr. 4 days          Closed/Suction Drain 09/22/17 1708 Right Neck Bulb 15 Fr. 4 days         Closed/Suction Drain 09/22/17 1709 Left Neck Bulb 15 Fr. 4 days         Closed/Suction Drain 09/22/17 1709 Right Chest Bulb 15 Fr. 4 days         Closed/Suction Drain 09/22/17 1710 Right Chest Bulb 15 Fr. 4 days          Airway                 Surgical Airway 09/24/17 1300 2 days          Peripheral Intravenous Line                 Peripheral IV - Single Lumen 09/25/17 0500 Left Antecubital 2 days                Physical Exam    Lying supine in bed  Breathing comfortably with humidified oxygen via aleksandra tube  Laryngectomy tube removed and cleaned, HME in place  Stoma healthy   Right chest incision without fluid collection, erythema, tenderness; staples intact    Drains  R Neck 10,   Medial Neck 12.5,   L Neck 17, - harvested   R chest 32    Significant Labs:    Results for NELLIE BLOOM (MRN [id number]) as of 9/27/2017 07:48   Ref. Range 9/27/2017 00:22 9/27/2017 00:26 9/27/2017 02:15 9/27/2017 04:22 9/27/2017 06:09   POCT Glucose Latest Ref Range: 70 - 110 mg/dL 56 (L) 47 (LL) 85 77        CBC    Recent Labs  Lab 09/25/17  0402   WBC 13.16*   HGB 8.8*   HCT 26.1*   MCV 92   *     BMP    Recent Labs  Lab 09/25/17  0402 09/26/17  0450 09/26/17  0937 09/27/17  0609   *  --  270*  --      --   --   --    K 3.8  --   --   --    CL 99  --   --   --    CO2 31*  --   --   --    BUN 18  --   --   --    CREATININE 0.7  --   --   --    CALCIUM 8.5*  --   --   --    PHOS  --  3.6  --  2.7   MG  --  1.2*  --  1.2*     COAGS  No results for input(s): PROTIME, INR, PTT in the last 72 hours.    Significant Diagnostics:  None    Assessment/Plan:     * Laryngeal cancer    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     -advance to day 5 of the pathway  - HUNTER care per ENT MD  - aleksandra tube in stoma with humidified oxygen or HME  -  consulted Hem/Onc Psychology ([place])  - HDS; hydralazine prn  -no abx needed  - strict NPO   -esophagram POD 7   -continuous TF via G tube per pathway               -Diabetasource AC goal of 50 cc/hr per nutrition recs  - Replace lytes PRN (Mg this am)  - Endo: postop PTH low, q8 ICa   rocaltrol, calcium carbonate; replacement c IV Ca prn  -synthroid 55 mcg IV started  - appreciate endocrine recs/mgmt of hyperglycemia   SSI  - Pt/OT; OOB, PPI, Lovenox  - Case mgmt consulted for d/c planning, anticipate early next week (Monday)            Wiliam Meeks MD  Otorhinolaryngology-Head & Neck Surgery  [facility]

## 2017-09-27 NOTE — PLAN OF CARE
Problem: Patient Care Overview  Goal: Plan of Care Review  Outcome: Ongoing (interventions implemented as appropriate)  Plan of care discussed with pt. Pt verbalizes understanding. Pt tolerating tube feedings with less than 30 residual. Pt tolerating tube feeding increase to meet the goal of 60ml/hr. Pain managed with PRN pain medications.  Pt ambulates to bathroom independently. Pt positions independently. VS stable. Pt remains free of falls and injury. Will continue to monitor.

## 2017-09-27 NOTE — ASSESSMENT & PLAN NOTE
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     -advance to day 5 of the pathway  - HUNTER care per ENT MD  - aleksandra tube in stoma with humidified oxygen or HME  - consulted Hem/Onc Psychology ([place])  - HDS; hydralazine prn  -no abx needed  - strict NPO   -esophagram POD 7   -continuous TF via G tube per pathway               -Diabetasource AC goal of 50 cc/hr per nutrition recs  - Replace lytes PRN (Mg this am)  - Endo: postop PTH low, q8 ICa   rocaltrol, calcium carbonate; replacement c IV Ca prn  -synthroid 55 mcg IV started  - appreciate endocrine recs/mgmt of hyperglycemia   SSI  - Pt/OT; OOB, PPI, Lovenox  - Case mgmt consulted for d/c planning, anticipate early next week (Monday)

## 2017-09-27 NOTE — PROGRESS NOTES
"Patient still hypoglycemic (compared to his usual baseline) as of 0400 blood glucose check - 77 mg/dL, patient communicating that he feels like his sugar is low, keeps mouthing that he "feels week". Denies nausea, vitals stable on room air. Still unsuccessful in reaching on call for endocrine. Spoke with staff MD on call for endocrine Dr. Coleman, explain patient's history and normal baseline of hyperglycemia to MD. She verbalized understanding and stated it will be okay with give patient an additional dose of 12.5 grams of dextrose 50% and continue to monitor.   "

## 2017-09-27 NOTE — PT/OT/SLP PROGRESS
Speech Language Pathology  Treatment    Luís Esteban   MRN: [id number]   Admitting Diagnosis: Laryngeal cancer    Diet recommendations: Solid Diet Level: NPO  Liquid Diet Level: NPO     SLP Treatment Date: 09/27/17  Speech Start Time: 0840     Speech Stop Time: 0852     Speech Total (min): 12 min       TREATMENT BILLABLE MINUTES:  [place] Care/Home Management Training 12    Has the patient been evaluated by SLP for swallowing? :  (NPO per MD team to be advanced by ENT )      General Precautions: Standard, aspiration, fall  Current Respiratory Status: laryngectomy (neck breather)       Subjective:  Pt awake    Pain/Comfort  Pain Rating 1:  (Pt did not report )  Pain Rating Post-Intervention 1:  (Pt did not report )    Objective:     Pt again resistant to stoma care this date reporting RT previously completed. Pt again requesting SLP return tomorrow. SLP provided extensive education re: importance of Pt ability to independently complete stoma care. SLP provided verbal instructions re: equipment needs and sequence of appropriate stoma care. Pt nodding in agreement.Pt continued to demonstrate disinterest in completing independent stoma care at this time and requesting SLP return at later time. SLP reported will attempt tp return in PM as able. Otherwise Pt to be seen again tomorrows date to complete independnetly . PT with limited visual engagement or response to SLP education this visit.    Assessment:  Luís Esteban is a 47 y.o. male with a medical diagnosis of Laryngeal cancer and presents with seen for ongoing AL use, stoma care and post-laryngectomy education. .    Discharge recommendations: Discharge Facility/Level Of Care Needs: home health speech therapy     Goals:    SLP Goals        Problem: SLP Goal    Goal Priority Disciplines Outcome   SLP Goal     SLP    Description:  Speech Language Pathology Goals  Goals expected to be met by 10/1    1. Pt/family will actively participate in post-laryngectomy  education to facilitate [place] and desensitization  2. Pt will communicate contextualized phrases via AL with approximately 60% intelligibility provided min cues to improve communication.  3. Pt/family will perform stoma care x1 per session provided min cues to facilitate [place].                                 Plan:   Patient to be seen Therapy Frequency: 5 x/week   Plan of Care expires: 10/23/17  Plan of Care reviewed with: patient  SLP Follow-up?: Yes              Anne Marie Solis CCC-SLP  09/27/2017

## 2017-09-27 NOTE — PROGRESS NOTES
"[facility]  Endocrinology  Progress Note    Admit Date: 9/22/2017     Reason for Consult: Management of T2DM, Hyperglycemia      Surgical Procedure and Date:   PEG/trach 8/30/17  total laryngectomy, bilateral neck dissection (II-IV, VI), total thyroidectomy 9/22/17     Diabetes diagnosis year: 2009     Home Diabetes Medications:    lantus 20 units nightly  novolog 4 units for TF boluses (Diabetisource 5x/day)  novolog 6 units for meals   novolog 10 units for TF bolus + meal      How often checking glucose at home? 1-3 x day   BG readings on regimen: ?  Hypoglycemia on the regimen? ?  Missed doses on regimen?  ?     Diabetes Complications include:     Hyperglycemia and Diabetic peripheral neuropathy      Complicating diabetes co morbidities:   Active Cancer    Hemoglobin A1C   Date Value Ref Range Status   09/03/2017 9.6 (H) 4.0 - 5.6 % Final         HPI:   Patient is a 47 y.o. male with a diagnosis of uncontrolled T2DM, s/p laryngectomy for SCC of larynx on 9/22, postsurgical hypothyroidism, S/p trach and peg, on enteral nutrition and diet as noted in above regimen at home. Endo consulted for post-op hyperglycemia.     Interval HPI:   Overnight events: C-peptide resulted yesterday <0.08. Received Levemir 10 units yesterday afternoon. + hypoglycemia overnight x2. Continuous TF were infusing overnight. BG trending back up this AM.   MINO pending  Eating:   NPO  Nausea: No  Hypoglycemia and intervention: Yes- received 2- 1/2 amps of D50  Fever: No  TPN and/or TF: Yes  If yes, type of TF/TPN and rate: Continuous TF- Diabetisource @ 60 ml/hr     BP (!) 148/84   Pulse 69   Temp 98.7 °F (37.1 °C)   Resp 18   Ht 5' 11" (1.803 m)   Wt 60 kg (132 lb 4.4 oz)   SpO2 99%   BMI 18.45 kg/m²       Labs Reviewed and Include      Recent Labs  Lab 09/27/17  0850   *   CALCIUM 7.2*   ALBUMIN 2.1*   PROT 6.1   *   K 4.2   CO2 35*   CL 92*   BUN 15   CREATININE 0.7   ALKPHOS 68   ALT 17   AST 20 "   BILITOT 0.3     Lab Results   Component Value Date    WBC 13.16 (H) 09/25/2017    HGB 8.8 (L) 09/25/2017    HCT 26.1 (L) 09/25/2017    MCV 92 09/25/2017     (H) 09/25/2017     No results for input(s): TSH, FREET4 in the last 168 hours.  Lab Results   Component Value Date    HGBA1C 9.6 (H) 09/03/2017       Nutritional status:   Body mass index is 18.45 kg/m².  Lab Results   Component Value Date    ALBUMIN 2.1 (L) 09/27/2017    ALBUMIN 2.0 (L) 09/25/2017    ALBUMIN 2.1 (L) 09/24/2017     No results found for: PREALBUMIN    Estimated Creatinine Clearance: 109.5 mL/min (based on SCr of 0.7 mg/dL).    Accu-Checks  Recent Labs      09/26/17   1220  09/26/17   1500  09/26/17   2010  09/27/17   0022  09/27/17   0026  09/27/17   0215  09/27/17   0422  09/27/17   0756  09/27/17   1024  09/27/17   1249   POCTGLUCOSE  257*  352*  180*  56*  47*  85  77  215*  278*  386*       Current Medications and/or Treatments Impacting Glycemic Control  Immunotherapy:  Immunosuppressants     None        Steroids:   Hormones     None        Pressors:    Autonomic Drugs     None        Hyperglycemia/Diabetes Medications: Antihyperglycemics     Start     Stop Route Frequency Ordered    09/28/17 0800  insulin aspart pen 2 Units      -- SubQ Every 24 hours (non-standard times) 09/27/17 1257    09/28/17 0400  insulin aspart pen 2 Units      -- SubQ Every 24 hours (non-standard times) 09/27/17 1257    09/28/17 0000  insulin aspart pen 2 Units      -- SubQ Every 24 hours (non-standard times) 09/27/17 1257    09/27/17 2000  insulin aspart pen 2 Units      -- SubQ Every 24 hours (non-standard times) 09/27/17 1257    09/27/17 1600  insulin aspart pen 2 Units      -- SubQ Every 24 hours (non-standard times) 09/27/17 1257    09/27/17 1257  insulin aspart pen 2 Units      -- SubQ Every 24 hours (non-standard times) 09/27/17 1257    09/27/17 1030  insulin detemir pen 3 Units      -- SubQ 2 times daily 09/27/17 1026    09/25/17 1200  insulin  aspart pen 0-5 Units      -- SubQ Every 4 hours PRN 09/25/17 0834          ASSESSMENT and PLAN    * Laryngeal cancer    Per ENT           Type 1 diabetes mellitus with hyperglycemia    BG goal 140-180. Low C-peptide, now T1DM. + hypoglycemia overnight, likely too much basal insulin.   BG checked at 10AM- 278. Decrease Levemir dose to 3 units BID, starting this AM.  BG q4h with low dose correction scale.   Will hold scheduled Novolog for now, as he missed a dose last night at 8PM with TF infusing and experienced hypoglycemia overnight- although likely too much basal.   MINO pending.     ADDENDUM 1300: , TF infusing. He received Levemir 3 units this AM.   Start Novolog 2 units q4h with TF, to be held if TF off or if BG <100    ADDENDUM: BG trending down below goal, hold scheduled Novolog overnight.      9/26 Cpeptide < 0.08 with - now T1DM.       Dispo: TBD. Dependent on nutritional needs.   Has Lantus and Novolog supply  Recommend following up with endocrine for long term management.          Hypoglycemia due to type 1 diabetes mellitus    See above. Insulin doses adjusted. + s/s with 2 episodes this AM. + s/s with BG < 80.           On enteral nutrition    May increase insulin needs  TF @ goal 60 ml/hr          Hypothyroidism, postsurgical    On Levothyroxine 112 mcg daily.   Check FT4 in 4 weeks (~10/22)        Dysphagia, oropharyngeal    NPO on continuous TF               Krista Garcia, NP  Endocrinology  [facility]

## 2017-09-27 NOTE — PLAN OF CARE
Problem: Patient Care Overview  Goal: Plan of Care Review  Outcome: Ongoing (interventions implemented as appropriate)  No acute events overnight. Patient understands and agrees with plan of care, becoming more independent with aleksandra care, educated frequently. Nods/gestures appropriately to indicate understanding. Vitals stable. Aleksandra tube in place as well as HME valve, ties clean and secure. Drain care provided to HUNTER drains x 5. Bilateral neck incisions with staples open to air, right chest incision with staples open to air, antibiotic ointment applied. Swelling noted, improving. Pain controlled on current regimen. Tube feeding at goal of 60mL/hour via G-tube. IVF at 65mL/hour (IVF + TF = 125mL/hour). Residuals <  50mL, checked every 4 hours. No nausea reported. 1 BM overnight. Urine output adequate overnight. Blood sugars checked every 4 hours. At 2000, result was 180 mg/dL, patient refused scheduled 2 units novolog, checked twice again at 0000, results 56 mg/dL and 47 mg/dL. PRN Dextrose 50% IV given, multiple unsuccessful attempts made to page on call for endocrine for any further instructions. Spoke with Dr. Patel with ENT who stated to hold remaining scheduled novolog for this shift and endocrine with re-assess in the morning. Instructed to call for help as needed, call light in reach. Call light answered in person and communication board utilized. Bed in lowest position and brake set, frequent rounds made for safety. See flowsheets for detailed assessment. White board in patient's room updated accordingly. Continuing to monitor.

## 2017-09-27 NOTE — PLAN OF CARE
Problem: SLP Goal  Goal: SLP Goal  Speech Language Pathology Goals  Goals expected to be met by 10/1    1. Pt/family will actively participate in post-laryngectomy education to facilitate [place] and desensitization  2. Pt will communicate contextualized phrases via AL with approximately 60% intelligibility provided min cues to improve communication.  3. Pt/family will perform stoma care x1 per session provided min cues to facilitate [place].                 Pt with slow progress towards goals     Anne Marie Solis MS, CCC-SLP  Speech Language Pathologist  Pager: [phone]  Date 9/27/2017

## 2017-09-27 NOTE — PROGRESS NOTES
Checked patient's POCT glucose at midnight twice, first result 56 mg/dL, second result 47 mg/dL. Per order, held scheduled 2 units of novolog and administered PRN Dextrose 50% 12.5 grams. Patient communicated that he feels like his sugar is low (lightheaded and slightly nauseated). Attempted to page on call for endocrine x 2 unsuccessfully. Paging on call for ENT now to notify team of BG changes. Continuing to monitor closely.

## 2017-09-27 NOTE — SUBJECTIVE & OBJECTIVE
"Interval HPI:   Overnight events: C-peptide resulted yesterday <0.08. Received Levemir 10 units yesterday afternoon. + hypoglycemia overnight x2. Continuous TF were infusing overnight. BG trending back up this AM.   MINO pending  Eating:   NPO  Nausea: No  Hypoglycemia and intervention: Yes- received 2- 1/2 amps of D50  Fever: No  TPN and/or TF: Yes  If yes, type of TF/TPN and rate: Continuous TF- Diabetisource @ 60 ml/hr     BP (!) 148/84   Pulse 69   Temp 98.7 °F (37.1 °C)   Resp 18   Ht 5' 11" (1.803 m)   Wt 60 kg (132 lb 4.4 oz)   SpO2 99%   BMI 18.45 kg/m²     Labs Reviewed and Include      Recent Labs  Lab 09/27/17  0850   *   CALCIUM 7.2*   ALBUMIN 2.1*   PROT 6.1   *   K 4.2   CO2 35*   CL 92*   BUN 15   CREATININE 0.7   ALKPHOS 68   ALT 17   AST 20   BILITOT 0.3     Lab Results   Component Value Date    WBC 13.16 (H) 09/25/2017    HGB 8.8 (L) 09/25/2017    HCT 26.1 (L) 09/25/2017    MCV 92 09/25/2017     (H) 09/25/2017     No results for input(s): TSH, FREET4 in the last 168 hours.  Lab Results   Component Value Date    HGBA1C 9.6 (H) 09/03/2017       Nutritional status:   Body mass index is 18.45 kg/m².  Lab Results   Component Value Date    ALBUMIN 2.1 (L) 09/27/2017    ALBUMIN 2.0 (L) 09/25/2017    ALBUMIN 2.1 (L) 09/24/2017     No results found for: PREALBUMIN    Estimated Creatinine Clearance: 109.5 mL/min (based on SCr of 0.7 mg/dL).    Accu-Checks  Recent Labs      09/26/17   1220  09/26/17   1500  09/26/17 2010 09/27/17   0022  09/27/17   0026  09/27/17   0215  09/27/17   0422  09/27/17   0756  09/27/17   1024  09/27/17   1249   POCTGLUCOSE  257*  352*  180*  56*  47*  85  77  215*  278*  386*       Current Medications and/or Treatments Impacting Glycemic Control  Immunotherapy:  Immunosuppressants     None        Steroids:   Hormones     None        Pressors:    Autonomic Drugs     None        Hyperglycemia/Diabetes Medications: Antihyperglycemics     Start     Stop " Route Frequency Ordered    09/28/17 0800  insulin aspart pen 2 Units      -- SubQ Every 24 hours (non-standard times) 09/27/17 1257    09/28/17 0400  insulin aspart pen 2 Units      -- SubQ Every 24 hours (non-standard times) 09/27/17 1257    09/28/17 0000  insulin aspart pen 2 Units      -- SubQ Every 24 hours (non-standard times) 09/27/17 1257    09/27/17 2000  insulin aspart pen 2 Units      -- SubQ Every 24 hours (non-standard times) 09/27/17 1257    09/27/17 1600  insulin aspart pen 2 Units      -- SubQ Every 24 hours (non-standard times) 09/27/17 1257    09/27/17 1257  insulin aspart pen 2 Units      -- SubQ Every 24 hours (non-standard times) 09/27/17 1257    09/27/17 1030  insulin detemir pen 3 Units      -- SubQ 2 times daily 09/27/17 1026    09/25/17 1200  insulin aspart pen 0-5 Units      -- SubQ Every 4 hours PRN 09/25/17 0834

## 2017-09-27 NOTE — SUBJECTIVE & OBJECTIVE
Interval History: No longer on insulin drip. Hypoglycemeia last pm; No resp issues; pain controlled; doing well on pathway    Medications:  Continuous Infusions:   lactated ringers 65 mL/hr at 09/26/17 2249     Scheduled Meds:   bacitracin   Topical (Top) TID    calcitriol  0.25 mcg Oral Daily    calcium carbonate  1,000 mg Per G Tube TID    enoxaparin  40 mg Subcutaneous Daily    famotidine  20 mg Per G Tube BID    insulin aspart  2 Units Subcutaneous Q24H    insulin aspart  2 Units Subcutaneous Q24H    insulin aspart  2 Units Subcutaneous Q24H    insulin aspart  2 Units Subcutaneous Q24H    insulin aspart  2 Units Subcutaneous Q24H    insulin aspart  2 Units Subcutaneous Q24H    insulin detemir  10 Units Subcutaneous Daily    levothyroxine  112 mcg Per G Tube Before breakfast     PRN Meds:calcium gluconate IVPB, calcium gluconate IVPB, calcium gluconate IVPB, dextrose 50%, diphenhydrAMINE, glucagon (human recombinant), hydrALAZINE, hydrocodone-apap 7.5-325 MG/15 ML, HYDROmorphone, HYDROmorphone, influenza, insulin aspart, magnesium sulfate IVPB, magnesium sulfate IVPB, ondansetron, potassium chloride **AND** potassium chloride **AND** potassium chloride, promethazine (PHENERGAN) IVPB     Review of patient's allergies indicates:  No Known Allergies  Objective:     Vital Signs (24h Range):  Temp:  [96.4 °F (35.8 °C)-97.6 °F (36.4 °C)] 96.4 °F (35.8 °C)  Pulse:  [64-78] 73  Resp:  [12-18] 16  SpO2:  [94 %-99 %] 98 %  BP: (117-135)/(75-83) 117/75        Lines/Drains/Airways     Drain                 Gastrostomy/Enterostomy 08/31/17 1900 26 days         Closed/Suction Drain 09/22/17 1708 Medial Neck Bulb 15 Fr. 4 days         Closed/Suction Drain 09/22/17 1708 Right Neck Bulb 15 Fr. 4 days         Closed/Suction Drain 09/22/17 1709 Left Neck Bulb 15 Fr. 4 days         Closed/Suction Drain 09/22/17 1709 Right Chest Bulb 15 Fr. 4 days         Closed/Suction Drain 09/22/17 1710 Right Chest Bulb 15 Fr. 4 days           Airway                 Surgical Airway 09/24/17 1300 2 days          Peripheral Intravenous Line                 Peripheral IV - Single Lumen 09/25/17 0500 Left Antecubital 2 days                Physical Exam    Lying supine in bed  Breathing comfortably with humidified oxygen via aleksandra tube  Laryngectomy tube removed and cleaned, HME in place  Stoma healthy   Right chest incision without fluid collection, erythema, tenderness; staples intact    Drains  R Neck 10,   Medial Neck 12.5,   L Neck 17, - harvested   R chest 32    Significant Labs:    Results for NELLIE BLOOM (MRN [id number]) as of 9/27/2017 07:48   Ref. Range 9/27/2017 00:22 9/27/2017 00:26 9/27/2017 02:15 9/27/2017 04:22 9/27/2017 06:09   POCT Glucose Latest Ref Range: 70 - 110 mg/dL 56 (L) 47 (LL) 85 77        CBC    Recent Labs  Lab 09/25/17  0402   WBC 13.16*   HGB 8.8*   HCT 26.1*   MCV 92   *     BMP    Recent Labs  Lab 09/25/17  0402 09/26/17  0450 09/26/17  0937 09/27/17  0609   *  --  270*  --      --   --   --    K 3.8  --   --   --    CL 99  --   --   --    CO2 31*  --   --   --    BUN 18  --   --   --    CREATININE 0.7  --   --   --    CALCIUM 8.5*  --   --   --    PHOS  --  3.6  --  2.7   MG  --  1.2*  --  1.2*     COAGS  No results for input(s): PROTIME, INR, PTT in the last 72 hours.    Significant Diagnostics:  None

## 2017-09-28 LAB
ALBUMIN SERPL BCP-MCNC: 2.1 G/DL
ALP SERPL-CCNC: 67 U/L
ALT SERPL W/O P-5'-P-CCNC: 16 U/L
ANION GAP SERPL CALC-SCNC: 7 MMOL/L
AST SERPL-CCNC: 25 U/L
BILIRUB SERPL-MCNC: 0.3 MG/DL
BUN SERPL-MCNC: 14 MG/DL
CA-I BLDV-SCNC: 0.89 MMOL/L
CALCIUM SERPL-MCNC: 6.9 MG/DL
CHLORIDE SERPL-SCNC: 90 MMOL/L
CO2 SERPL-SCNC: 33 MMOL/L
CREAT SERPL-MCNC: 0.8 MG/DL
EST. GFR  (AFRICAN AMERICAN): >60 ML/MIN/1.73 M^2
EST. GFR  (NON AFRICAN AMERICAN): >60 ML/MIN/1.73 M^2
GLUCOSE SERPL-MCNC: 299 MG/DL
LACTATE SERPL-SCNC: 0.6 MMOL/L
MAGNESIUM SERPL-MCNC: 1.1 MG/DL
PHOSPHATE SERPL-MCNC: 2.4 MG/DL
POCT GLUCOSE: 108 MG/DL (ref 70–110)
POCT GLUCOSE: 220 MG/DL (ref 70–110)
POCT GLUCOSE: 237 MG/DL (ref 70–110)
POCT GLUCOSE: 341 MG/DL (ref 70–110)
POCT GLUCOSE: 343 MG/DL (ref 70–110)
POCT GLUCOSE: 403 MG/DL (ref 70–110)
POCT GLUCOSE: >500 MG/DL (ref 70–110)
POTASSIUM SERPL-SCNC: 4.6 MMOL/L
PROT SERPL-MCNC: 6.2 G/DL
SODIUM SERPL-SCNC: 130 MMOL/L

## 2017-09-28 PROCEDURE — 80053 COMPREHEN METABOLIC PANEL: CPT

## 2017-09-28 PROCEDURE — 63600175 PHARM REV CODE 636 W HCPCS: Performed by: NURSE PRACTITIONER

## 2017-09-28 PROCEDURE — 84100 ASSAY OF PHOSPHORUS: CPT

## 2017-09-28 PROCEDURE — 25000003 PHARM REV CODE 250: Performed by: OTOLARYNGOLOGY

## 2017-09-28 PROCEDURE — 25000003 PHARM REV CODE 250

## 2017-09-28 PROCEDURE — 27000221 HC OXYGEN, UP TO 24 HOURS

## 2017-09-28 PROCEDURE — 99232 SBSQ HOSP IP/OBS MODERATE 35: CPT | Mod: ,,, | Performed by: NURSE PRACTITIONER

## 2017-09-28 PROCEDURE — 94761 N-INVAS EAR/PLS OXIMETRY MLT: CPT

## 2017-09-28 PROCEDURE — 25000003 PHARM REV CODE 250: Performed by: STUDENT IN AN ORGANIZED HEALTH CARE EDUCATION/TRAINING PROGRAM

## 2017-09-28 PROCEDURE — 36415 COLL VENOUS BLD VENIPUNCTURE: CPT

## 2017-09-28 PROCEDURE — 63600175 PHARM REV CODE 636 W HCPCS: Performed by: ANESTHESIOLOGY

## 2017-09-28 PROCEDURE — 20600001 HC STEP DOWN PRIVATE ROOM

## 2017-09-28 PROCEDURE — 83735 ASSAY OF MAGNESIUM: CPT

## 2017-09-28 PROCEDURE — 83605 ASSAY OF LACTIC ACID: CPT

## 2017-09-28 PROCEDURE — 63700000 PHARM REV CODE 250 ALT 637 W/O HCPCS: Performed by: STUDENT IN AN ORGANIZED HEALTH CARE EDUCATION/TRAINING PROGRAM

## 2017-09-28 PROCEDURE — 82330 ASSAY OF CALCIUM: CPT

## 2017-09-28 PROCEDURE — 63600175 PHARM REV CODE 636 W HCPCS: Performed by: OTOLARYNGOLOGY

## 2017-09-28 RX ORDER — INSULIN ASPART 100 [IU]/ML
2 INJECTION, SOLUTION INTRAVENOUS; SUBCUTANEOUS
Status: DISCONTINUED | OUTPATIENT
Start: 2017-09-29 | End: 2017-09-28

## 2017-09-28 RX ORDER — INSULIN ASPART 100 [IU]/ML
2 INJECTION, SOLUTION INTRAVENOUS; SUBCUTANEOUS
Status: DISCONTINUED | OUTPATIENT
Start: 2017-09-28 | End: 2017-09-28

## 2017-09-28 RX ORDER — GLUCAGON 1 MG
1 KIT INJECTION
Status: DISCONTINUED | OUTPATIENT
Start: 2017-09-28 | End: 2017-10-02

## 2017-09-28 RX ORDER — INSULIN ASPART 100 [IU]/ML
0-5 INJECTION, SOLUTION INTRAVENOUS; SUBCUTANEOUS EVERY 4 HOURS PRN
Status: DISCONTINUED | OUTPATIENT
Start: 2017-09-28 | End: 2017-10-01

## 2017-09-28 RX ORDER — INSULIN ASPART 100 [IU]/ML
3 INJECTION, SOLUTION INTRAVENOUS; SUBCUTANEOUS
Status: DISCONTINUED | OUTPATIENT
Start: 2017-09-28 | End: 2017-09-29

## 2017-09-28 RX ORDER — INSULIN ASPART 100 [IU]/ML
3 INJECTION, SOLUTION INTRAVENOUS; SUBCUTANEOUS
Status: DISCONTINUED | OUTPATIENT
Start: 2017-09-29 | End: 2017-09-29

## 2017-09-28 RX ORDER — INSULIN ASPART 100 [IU]/ML
0-5 INJECTION, SOLUTION INTRAVENOUS; SUBCUTANEOUS EVERY 4 HOURS PRN
Status: DISCONTINUED | OUTPATIENT
Start: 2017-09-28 | End: 2017-09-28

## 2017-09-28 RX ORDER — GLUCAGON 1 MG
1 KIT INJECTION
Status: DISCONTINUED | OUTPATIENT
Start: 2017-09-28 | End: 2017-09-28

## 2017-09-28 RX ADMIN — INSULIN ASPART 4 UNITS: 100 INJECTION, SOLUTION INTRAVENOUS; SUBCUTANEOUS at 11:09

## 2017-09-28 RX ADMIN — INSULIN ASPART 4 UNITS: 100 INJECTION, SOLUTION INTRAVENOUS; SUBCUTANEOUS at 03:09

## 2017-09-28 RX ADMIN — Medication 400 MG: at 08:09

## 2017-09-28 RX ADMIN — INSULIN ASPART 5 UNITS: 100 INJECTION, SOLUTION INTRAVENOUS; SUBCUTANEOUS at 08:09

## 2017-09-28 RX ADMIN — CALCIUM CARBONATE 1000 MG: 1250 SUSPENSION ORAL at 11:09

## 2017-09-28 RX ADMIN — CALCIUM CARBONATE 1000 MG: 1250 SUSPENSION ORAL at 05:09

## 2017-09-28 RX ADMIN — CALCIUM CARBONATE 1000 MG: 1250 SUSPENSION ORAL at 01:09

## 2017-09-28 RX ADMIN — FAMOTIDINE 20 MG: 40 POWDER, FOR SUSPENSION ORAL at 10:09

## 2017-09-28 RX ADMIN — HYDROMORPHONE HYDROCHLORIDE 1 MG: 1 INJECTION, SOLUTION INTRAMUSCULAR; INTRAVENOUS; SUBCUTANEOUS at 11:09

## 2017-09-28 RX ADMIN — INSULIN DETEMIR 3 UNITS: 100 INJECTION, SOLUTION SUBCUTANEOUS at 09:09

## 2017-09-28 RX ADMIN — INSULIN ASPART 3 UNITS: 100 INJECTION, SOLUTION INTRAVENOUS; SUBCUTANEOUS at 08:09

## 2017-09-28 RX ADMIN — BACITRACIN: 500 OINTMENT TOPICAL at 09:09

## 2017-09-28 RX ADMIN — FAMOTIDINE 20 MG: 40 POWDER, FOR SUSPENSION ORAL at 08:09

## 2017-09-28 RX ADMIN — ENOXAPARIN SODIUM 40 MG: 100 INJECTION SUBCUTANEOUS at 04:09

## 2017-09-28 RX ADMIN — HYDROCODONE BITARTRATE AND ACETAMINOPHEN 15 ML: 7.5; 325 SOLUTION ORAL at 08:09

## 2017-09-28 RX ADMIN — LEVOTHYROXINE SODIUM 112 MCG: 112 TABLET ORAL at 05:09

## 2017-09-28 RX ADMIN — BACITRACIN: 500 OINTMENT TOPICAL at 05:09

## 2017-09-28 RX ADMIN — INSULIN ASPART 2 UNITS: 100 INJECTION, SOLUTION INTRAVENOUS; SUBCUTANEOUS at 08:09

## 2017-09-28 RX ADMIN — CALCITRIOL 0.25 MCG: 1 SOLUTION ORAL at 10:09

## 2017-09-28 RX ADMIN — BACITRACIN: 500 OINTMENT TOPICAL at 01:09

## 2017-09-28 RX ADMIN — INSULIN ASPART 2 UNITS: 100 INJECTION, SOLUTION INTRAVENOUS; SUBCUTANEOUS at 11:09

## 2017-09-28 RX ADMIN — INSULIN ASPART 2 UNITS: 100 INJECTION, SOLUTION INTRAVENOUS; SUBCUTANEOUS at 03:09

## 2017-09-28 NOTE — PLAN OF CARE
Problem: Patient Care Overview  Goal: Plan of Care Review  Outcome: Ongoing (interventions implemented as appropriate)  Plan of care reviewed with pt. Pt AAOx'3, vital signs stable. Pt currently Npo, tube feeds running continuously and pt tolerating well. Pain well controlled with prn meds. All incisions dry and intact. Pt currently resting quiety. Bed in low and locked position with call light in reach. No acute events at this time. TM

## 2017-09-28 NOTE — SUBJECTIVE & OBJECTIVE
"Interval HPI:   No hypoglycemia, bg elevated overnight, TF at 60cc/h, afebrile  NPO    /76   Pulse 72   Temp 98.7 °F (37.1 °C)   Resp 16   Ht 5' 11" (1.803 m)   Wt 60 kg (132 lb 4.4 oz)   SpO2 98%   BMI 18.45 kg/m²     Labs Reviewed and Include      Recent Labs  Lab 09/28/17  0602   *   CALCIUM 6.9*   ALBUMIN 2.1*   PROT 6.2   *   K 4.6   CO2 33*   CL 90*   BUN 14   CREATININE 0.8   ALKPHOS 67   ALT 16   AST 25   BILITOT 0.3     Lab Results   Component Value Date    WBC 13.16 (H) 09/25/2017    HGB 8.8 (L) 09/25/2017    HCT 26.1 (L) 09/25/2017    MCV 92 09/25/2017     (H) 09/25/2017     No results for input(s): TSH, FREET4 in the last 168 hours.  Lab Results   Component Value Date    HGBA1C 9.6 (H) 09/03/2017       Nutritional status:   Body mass index is 18.45 kg/m².  Lab Results   Component Value Date    ALBUMIN 2.1 (L) 09/28/2017    ALBUMIN 2.1 (L) 09/27/2017    ALBUMIN 2.0 (L) 09/25/2017     No results found for: PREALBUMIN    Estimated Creatinine Clearance: 95.8 mL/min (based on SCr of 0.8 mg/dL).    Accu-Checks  Recent Labs      09/27/17   0026  09/27/17   0215  09/27/17   0422  09/27/17   0756  09/27/17   1024  09/27/17   1249  09/27/17   1642  09/27/17   2121  09/28/17   0008  09/28/17   0427   POCTGLUCOSE  47*  85  77  215*  278*  386*  355*  101  108  220*       Current Medications and/or Treatments Impacting Glycemic Control  Immunotherapy:  Immunosuppressants     None        Steroids:   Hormones     None        Pressors:    Autonomic Drugs     None        Hyperglycemia/Diabetes Medications: Antihyperglycemics     Start     Stop Route Frequency Ordered    09/28/17 1200  insulin aspart pen 2 Units      -- SubQ Every 24 hours (non-standard times) 09/27/17 1337    09/28/17 0800  insulin aspart pen 2 Units      -- SubQ Every 24 hours (non-standard times) 09/27/17 1257    09/27/17 1600  insulin aspart pen 2 Units      -- SubQ Every 24 hours (non-standard times) 09/27/17 1257    " 09/27/17 1030  insulin detemir pen 3 Units      -- SubQ 2 times daily 09/27/17 1026    09/25/17 1200  insulin aspart pen 0-5 Units      -- SubQ Every 4 hours PRN 09/25/17 0894

## 2017-09-28 NOTE — PROGRESS NOTES
[facility]  Otorhinolaryngology-Head & Neck Surgery  Progress Note    Subjective:     Post-Op Info:  Procedure(s) (LRB):  DISSECTION-NECK (Right)  LARYNGOSCOPY (N/A)  FLAP-ROTATION (N/A)  LARYNGECTOMY (N/A)  DISSECTION-NECK (Left)   6 Days Post-Op  Hospital Day: 7     Interval History:  No resp issues; pain controlled; doing well on pathway; endocrine managing glucose, calcium, synthroid    Medications:  Continuous Infusions:   lactated ringers 65 mL/hr at 09/27/17 1726     Scheduled Meds:   bacitracin   Topical (Top) TID    calcitriol  0.25 mcg Oral Daily    calcium carbonate  1,000 mg Per G Tube TID    enoxaparin  40 mg Subcutaneous Daily    famotidine  20 mg Per G Tube BID    insulin aspart  2 Units Subcutaneous Q24H    insulin aspart  2 Units Subcutaneous Q24H    insulin aspart  2 Units Subcutaneous Q24H    insulin detemir  3 Units Subcutaneous BID    levothyroxine  112 mcg Per G Tube Before breakfast    magnesium oxide  400 mg Oral BID     PRN Meds:calcium gluconate IVPB, calcium gluconate IVPB, calcium gluconate IVPB, dextrose 50%, diphenhydrAMINE, glucagon (human recombinant), hydrALAZINE, hydrocodone-apap 7.5-325 MG/15 ML, HYDROmorphone, HYDROmorphone, influenza, insulin aspart, magnesium sulfate IVPB, magnesium sulfate IVPB, ondansetron, potassium chloride **AND** potassium chloride **AND** potassium chloride, promethazine (PHENERGAN) IVPB     Review of patient's allergies indicates:  No Known Allergies  Objective:     Vital Signs (24h Range):  Temp:  [96.3 °F (35.7 °C)-98.8 °F (37.1 °C)] 98.8 °F (37.1 °C)  Pulse:  [69-76] 76  Resp:  [12-18] 17  SpO2:  [95 %-99 %] 95 %  BP: (111-148)/(69-86) 121/80        Lines/Drains/Airways     Drain                 Gastrostomy/Enterostomy 08/31/17 1900 27 days         Closed/Suction Drain 09/22/17 1708 Medial Neck Bulb 15 Fr. 5 days         Closed/Suction Drain 09/22/17 1708 Right Neck Bulb 15 Fr. 5 days         Closed/Suction Drain  09/22/17 1709 Right Chest Bulb 15 Fr. 5 days         Closed/Suction Drain 09/22/17 1710 Right Chest Bulb 15 Fr. 5 days          Airway                 Surgical Airway 09/24/17 1300 3 days          Peripheral Intravenous Line                 Peripheral IV - Single Lumen 09/27/17 1724 Left Forearm less than 1 day                Physical Exam    Lying supine in bed, head elevated  Breathing comfortably with humidified oxygen via aleksandra tube  Laryngectomy tube clean c HME in place  Stoma healthy, intact  Right chest incision without fluid collection, erythema, tenderness; staples intact    Drains  R Neck 10,   Medial Neck 12.5,   R chest - 25  R chest 30    Significant Labs:    Results for NELLIE BLOOM (MRN [id number]) as of 9/28/2017 06:55   Ref. Range 9/27/2017 12:49 9/27/2017 16:42 9/27/2017 21:21 9/28/2017 00:08 9/28/2017 04:27   POCT Glucose Latest Ref Range: 70 - 110 mg/dL 386 (H) 355 (H) 101 108 220 (H)       CBC  No results for input(s): WBC, HGB, HCT, MCV, PLT in the last 72 hours.  BMP    Recent Labs  Lab 09/26/17  0450 09/26/17  0937 09/27/17  0609 09/27/17  0850   GLU  --  270*  --  197*   NA  --   --   --  133*   K  --   --   --  4.2   CL  --   --   --  92*   CO2  --   --   --  35*   BUN  --   --   --  15   CREATININE  --   --   --  0.7   CALCIUM  --   --   --  7.2*   PHOS 3.6  --  2.7  --    MG 1.2*  --  1.2*  --      COAGS  No results for input(s): PROTIME, INR, PTT in the last 72 hours.    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     -advance to day 6 of the pathway  - HUNTER care per ENT MDs  - aleksandra tube in stoma c HME  - consulted Hem/Onc Psychology (see note 9/25/17) - no interventions  - HDS  -no abx needed  - strict NPO   -esophagram POD 7 (9/29/17)   -continuous TF via G tube per pathway               -Diabetasource  AC goal of 60 cc/hr per nutrition recs  - Replace lytes PRN  - Endo: postop PTH <5   rocaltrol, calcium carbonate   -synthroid 55 mcg IV started  - T1DM hyperglycemia   -insulin per endocrine mgmnt  - Pt/OT; OOB, PPI, Lovenox  - Case mgmt consulted for d/c planning, anticipate early next week (Monday)   -needs PT/OT/SLP, supplies for laryngectomy tube and PEG tube            Wiliam Meeks MD  Otorhinolaryngology-Head & Neck Surgery  [facility]

## 2017-09-28 NOTE — ASSESSMENT & PLAN NOTE
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     -advance to day 6 of the pathway  - HUNTER care per ENT MDs  - aleksandra tube in stoma c HME  - consulted Hem/Onc Psychology (see note 9/25/17) - no interventions  - HDS  -no abx needed  - strict NPO   -esophagram POD 7 (9/29/17)   -continuous TF via G tube per pathway               -Diabetasource AC goal of 60 cc/hr per nutrition recs  - Replace lytes PRN  - Endo: postop PTH <5   rocaltrol, calcium carbonate   -synthroid 55 mcg IV started  - T1DM hyperglycemia   -insulin per endocrine mgmnt  - Pt/OT; OOB, PPI, Lovenox  - Case mgmt consulted for d/c planning, anticipate early next week (Monday)   -needs PT/OT/SLP, supplies for laryngectomy tube and PEG tube

## 2017-09-28 NOTE — PT/OT/SLP PROGRESS
Speech Language Pathology      Luís Esteban  MRN: [id number]    Pt with poor motivation to participate in stoma care and/or AL training this session. SLP explained importance of consistent stoma care and establishing a consistent method of communication. Via gestures and mouthing of words Pt reporting independently completing stoma care at 1:00pm. SLP has not observed Pt ability to independently complete stoma care and refusing to this session despite SLP explanation.  Patient unwilling to participate. SLP paged ENT service to notify medical team of therapeutic barriers and will re-attempt to see one additional time prior to discharge for lack of participation. RN aware.     Anne Marie Solis, JOCY-SLP

## 2017-09-28 NOTE — PLAN OF CARE
Problem: Patient Care Overview  Goal: Plan of Care Review  Plan of care reviewed with patient, all questions and concerns addressed. Pt vital signs remain normal and stable for patient with no complaints of SOB or headaches. Patient pain well controlled with ordered pain medications. Patient is tolerating tube feeding diet well with no complaints of nausea or vomiting. Patient is performing self suctioning of kelly-tube and mouth. Patient is resting quietly at this time, will continue to monitor.

## 2017-09-28 NOTE — SUBJECTIVE & OBJECTIVE
Interval History:  No resp issues; pain controlled; doing well on pathway; endocrine managing glucose, calcium, synthroid    Medications:  Continuous Infusions:   lactated ringers 65 mL/hr at 09/27/17 1726     Scheduled Meds:   bacitracin   Topical (Top) TID    calcitriol  0.25 mcg Oral Daily    calcium carbonate  1,000 mg Per G Tube TID    enoxaparin  40 mg Subcutaneous Daily    famotidine  20 mg Per G Tube BID    insulin aspart  2 Units Subcutaneous Q24H    insulin aspart  2 Units Subcutaneous Q24H    insulin aspart  2 Units Subcutaneous Q24H    insulin detemir  3 Units Subcutaneous BID    levothyroxine  112 mcg Per G Tube Before breakfast    magnesium oxide  400 mg Oral BID     PRN Meds:calcium gluconate IVPB, calcium gluconate IVPB, calcium gluconate IVPB, dextrose 50%, diphenhydrAMINE, glucagon (human recombinant), hydrALAZINE, hydrocodone-apap 7.5-325 MG/15 ML, HYDROmorphone, HYDROmorphone, influenza, insulin aspart, magnesium sulfate IVPB, magnesium sulfate IVPB, ondansetron, potassium chloride **AND** potassium chloride **AND** potassium chloride, promethazine (PHENERGAN) IVPB     Review of patient's allergies indicates:  No Known Allergies  Objective:     Vital Signs (24h Range):  Temp:  [96.3 °F (35.7 °C)-98.8 °F (37.1 °C)] 98.8 °F (37.1 °C)  Pulse:  [69-76] 76  Resp:  [12-18] 17  SpO2:  [95 %-99 %] 95 %  BP: (111-148)/(69-86) 121/80        Lines/Drains/Airways     Drain                 Gastrostomy/Enterostomy 08/31/17 1900 27 days         Closed/Suction Drain 09/22/17 1708 Medial Neck Bulb 15 Fr. 5 days         Closed/Suction Drain 09/22/17 1708 Right Neck Bulb 15 Fr. 5 days         Closed/Suction Drain 09/22/17 1709 Right Chest Bulb 15 Fr. 5 days         Closed/Suction Drain 09/22/17 1710 Right Chest Bulb 15 Fr. 5 days          Airway                 Surgical Airway 09/24/17 1300 3 days          Peripheral Intravenous Line                 Peripheral IV - Single Lumen 09/27/17 1724 Left  Forearm less than 1 day                Physical Exam    Lying supine in bed, head elevated  Breathing comfortably with humidified oxygen via aleksandra tube  Laryngectomy tube clean c HME in place  Stoma healthy, intact  Right chest incision without fluid collection, erythema, tenderness; staples intact    Drains  R Neck 10,   Medial Neck 12.5,   R chest - 25  R chest 30    Significant Labs:    Results for NELLIE BLOOM (MRN [id number]) as of 9/28/2017 06:55   Ref. Range 9/27/2017 12:49 9/27/2017 16:42 9/27/2017 21:21 9/28/2017 00:08 9/28/2017 04:27   POCT Glucose Latest Ref Range: 70 - 110 mg/dL 386 (H) 355 (H) 101 108 220 (H)       CBC  No results for input(s): WBC, HGB, HCT, MCV, PLT in the last 72 hours.  BMP    Recent Labs  Lab 09/26/17  0450 09/26/17  0937 09/27/17  0609 09/27/17  0850   GLU  --  270*  --  197*   NA  --   --   --  133*   K  --   --   --  4.2   CL  --   --   --  92*   CO2  --   --   --  35*   BUN  --   --   --  15   CREATININE  --   --   --  0.7   CALCIUM  --   --   --  7.2*   PHOS 3.6  --  2.7  --    MG 1.2*  --  1.2*  --      COAGS  No results for input(s): PROTIME, INR, PTT in the last 72 hours.    Significant Diagnostics:  None

## 2017-09-28 NOTE — ASSESSMENT & PLAN NOTE
BG goal 140-180. Increase levemir 5 u bid, Increase Novolog 4 u q4h with low dose correction scale.  BG monitoring q4h     9/26/17 Cpeptide < 0.08, - now T1DM.    MINO pending.      Dispo: on-going  Has Lantus and Novolog supply  Recommend following up with endocrine for long term management.

## 2017-09-28 NOTE — ASSESSMENT & PLAN NOTE
BG goal 140-180. Continue levemir 3 u bid, Start Novolog 2 u q4h with low dose correction scale.  BG monitoring q4h     9/26/17 Cpeptide < 0.08, - now T1DM.    MINO pending.    Dispo: on-going  Has Lantus and Novolog supply  Recommend following up with endocrine for long term management.

## 2017-09-28 NOTE — PROGRESS NOTES
"[facility]  Endocrinology  Progress Note    Admit Date: 9/22/2017     Reason for Consult: Management of T2DM     Surgical Procedure and Date: PEG/trach 8/30/17; total laryngectomy, bilateral neck dissection (II-IV, VI), total thyroidectomy 9/22/17     Diabetes diagnosis year: 2009     Home Diabetes Medications: lantus 20 units qhs; Novolog 4 units for TF boluses (Diabetisource 5x/day)  Novolog 6 units AC; novolog 10 units for TF bolus + meal      How often checking glucose at home? 1-3 x day       Diabetes Complications include: Hyperglycemia and Diabetic peripheral neuropathy      Complicating diabetes co morbidities: Active Cancer    HPI: Patient is a 47 y.o. male with a diagnosis of uncontrolled T2DM, s/p laryngectomy for SCC of larynx on 9/22, postsurgical hypothyroidism, S/p trach and peg, on enteral nutrition and diet as noted in above regimen at home. Endo consulted for post-op hyperglycemia.     Interval HPI:   No hypoglycemia, bg elevated overnight, TF at 60cc/h, afebrile  NPO    /76   Pulse 72   Temp 98.7 °F (37.1 °C)   Resp 16   Ht 5' 11" (1.803 m)   Wt 60 kg (132 lb 4.4 oz)   SpO2 98%   BMI 18.45 kg/m²       Labs Reviewed and Include      Recent Labs  Lab 09/28/17  0602   *   CALCIUM 6.9*   ALBUMIN 2.1*   PROT 6.2   *   K 4.6   CO2 33*   CL 90*   BUN 14   CREATININE 0.8   ALKPHOS 67   ALT 16   AST 25   BILITOT 0.3     Lab Results   Component Value Date    WBC 13.16 (H) 09/25/2017    HGB 8.8 (L) 09/25/2017    HCT 26.1 (L) 09/25/2017    MCV 92 09/25/2017     (H) 09/25/2017     No results for input(s): TSH, FREET4 in the last 168 hours.  Lab Results   Component Value Date    HGBA1C 9.6 (H) 09/03/2017       Nutritional status:   Body mass index is 18.45 kg/m².  Lab Results   Component Value Date    ALBUMIN 2.1 (L) 09/28/2017    ALBUMIN 2.1 (L) 09/27/2017    ALBUMIN 2.0 (L) 09/25/2017     No results found for: PREALBUMIN    Estimated Creatinine Clearance: " 95.8 mL/min (based on SCr of 0.8 mg/dL).    Accu-Checks  Recent Labs      09/27/17   0026  09/27/17   0215  09/27/17   0422  09/27/17   0756  09/27/17   1024  09/27/17   1249  09/27/17   1642  09/27/17   2121  09/28/17   0008  09/28/17   0427   POCTGLUCOSE  47*  85  77  215*  278*  386*  355*  101  108  220*       Current Medications and/or Treatments Impacting Glycemic Control  Immunotherapy:  Immunosuppressants     None        Steroids:   Hormones     None        Pressors:    Autonomic Drugs     None        Hyperglycemia/Diabetes Medications: Antihyperglycemics     Start     Stop Route Frequency Ordered    09/28/17 1200  insulin aspart pen 2 Units      -- SubQ Every 24 hours (non-standard times) 09/27/17 1337    09/28/17 0800  insulin aspart pen 2 Units      -- SubQ Every 24 hours (non-standard times) 09/27/17 1257    09/27/17 1600  insulin aspart pen 2 Units      -- SubQ Every 24 hours (non-standard times) 09/27/17 1257    09/27/17 1030  insulin detemir pen 3 Units      -- SubQ 2 times daily 09/27/17 1026    09/25/17 1200  insulin aspart pen 0-5 Units      -- SubQ Every 4 hours PRN 09/25/17 0834          ASSESSMENT and PLAN    * Laryngeal cancer    Per ENT           Type 1 diabetes mellitus with hyperglycemia    BG goal 140-180. Continue levemir 3 u bid, Start Novolog 2 u q4h with low dose correction scale.  BG monitoring q4h     9/26/17 Cpeptide < 0.08, - now T1DM.    MINO pending.    Dispo: on-going  Has Lantus and Novolog supply  Recommend following up with endocrine for long term management.          Hypothyroidism, postsurgical    On Levothyroxine 112 mcg daily.   Check FT4 in 4 weeks (~10/22)        On enteral nutrition    May increase insulin needs  TF @ goal 60 ml/hr          Dysphagia, oropharyngeal    NPO on continuous TF             ADDENDUM 1600 BG globally elevated, increase Novolog to 3 u q4h    Nora Pettit, DNP, NP  Endocrinology  [facility]

## 2017-09-28 NOTE — PLAN OF CARE
Problem: Patient Care Overview  Goal: Plan of Care Review  Outcome: Ongoing (interventions implemented as appropriate)  Plan of care discussed with pt. Pt verbalizes understanding. Pt tolerating tube feedings with no residuals. Pain managed with PRN pain medications. Pt positions independently. VS stable. Pt is withdrawn and refuses to take part in his care. Will continue to monitor.

## 2017-09-29 PROBLEM — E10.649 HYPOGLYCEMIA DUE TO TYPE 1 DIABETES MELLITUS: Status: RESOLVED | Noted: 2017-09-27 | Resolved: 2017-09-29

## 2017-09-29 LAB
ALBUMIN SERPL BCP-MCNC: 2.2 G/DL
ALP SERPL-CCNC: 67 U/L
ALT SERPL W/O P-5'-P-CCNC: 18 U/L
ANION GAP SERPL CALC-SCNC: 9 MMOL/L
AST SERPL-CCNC: 19 U/L
BILIRUB SERPL-MCNC: 0.3 MG/DL
BUN SERPL-MCNC: 15 MG/DL
CA-I BLDV-SCNC: 0.82 MMOL/L
CALCIUM SERPL-MCNC: 7 MG/DL
CHLORIDE SERPL-SCNC: 89 MMOL/L
CO2 SERPL-SCNC: 30 MMOL/L
CREAT SERPL-MCNC: 0.8 MG/DL
EST. GFR  (AFRICAN AMERICAN): >60 ML/MIN/1.73 M^2
EST. GFR  (NON AFRICAN AMERICAN): >60 ML/MIN/1.73 M^2
GAD65 AB SER-SCNC: 18.7 NMOL/L
GLUCOSE SERPL-MCNC: 271 MG/DL
LACTATE SERPL-SCNC: 0.7 MMOL/L
MAGNESIUM SERPL-MCNC: 1.2 MG/DL
PHOSPHATE SERPL-MCNC: 2.7 MG/DL
POCT GLUCOSE: 176 MG/DL (ref 70–110)
POCT GLUCOSE: 315 MG/DL (ref 70–110)
POCT GLUCOSE: 334 MG/DL (ref 70–110)
POCT GLUCOSE: 338 MG/DL (ref 70–110)
POCT GLUCOSE: 371 MG/DL (ref 70–110)
POCT GLUCOSE: 409 MG/DL (ref 70–110)
POCT GLUCOSE: 418 MG/DL (ref 70–110)
POCT GLUCOSE: 429 MG/DL (ref 70–110)
POTASSIUM SERPL-SCNC: 4.5 MMOL/L
PROT SERPL-MCNC: 6.3 G/DL
SODIUM SERPL-SCNC: 128 MMOL/L

## 2017-09-29 PROCEDURE — 82330 ASSAY OF CALCIUM: CPT

## 2017-09-29 PROCEDURE — 36415 COLL VENOUS BLD VENIPUNCTURE: CPT

## 2017-09-29 PROCEDURE — 27200966 HC CLOSED SUCTION SYSTEM

## 2017-09-29 PROCEDURE — 63700000 PHARM REV CODE 250 ALT 637 W/O HCPCS: Performed by: STUDENT IN AN ORGANIZED HEALTH CARE EDUCATION/TRAINING PROGRAM

## 2017-09-29 PROCEDURE — 20600001 HC STEP DOWN PRIVATE ROOM

## 2017-09-29 PROCEDURE — 63600175 PHARM REV CODE 636 W HCPCS: Performed by: ANESTHESIOLOGY

## 2017-09-29 PROCEDURE — 92609 USE OF SPEECH DEVICE SERVICE: CPT

## 2017-09-29 PROCEDURE — 27000221 HC OXYGEN, UP TO 24 HOURS

## 2017-09-29 PROCEDURE — 80053 COMPREHEN METABOLIC PANEL: CPT

## 2017-09-29 PROCEDURE — 63600175 PHARM REV CODE 636 W HCPCS: Performed by: NURSE PRACTITIONER

## 2017-09-29 PROCEDURE — 25000003 PHARM REV CODE 250: Performed by: STUDENT IN AN ORGANIZED HEALTH CARE EDUCATION/TRAINING PROGRAM

## 2017-09-29 PROCEDURE — 94761 N-INVAS EAR/PLS OXIMETRY MLT: CPT

## 2017-09-29 PROCEDURE — 25000003 PHARM REV CODE 250: Performed by: OTOLARYNGOLOGY

## 2017-09-29 PROCEDURE — 83605 ASSAY OF LACTIC ACID: CPT

## 2017-09-29 PROCEDURE — 25000003 PHARM REV CODE 250

## 2017-09-29 PROCEDURE — 84100 ASSAY OF PHOSPHORUS: CPT

## 2017-09-29 PROCEDURE — 99232 SBSQ HOSP IP/OBS MODERATE 35: CPT | Mod: ,,, | Performed by: NURSE PRACTITIONER

## 2017-09-29 PROCEDURE — 25500020 PHARM REV CODE 255: Performed by: OTOLARYNGOLOGY

## 2017-09-29 PROCEDURE — 83735 ASSAY OF MAGNESIUM: CPT

## 2017-09-29 PROCEDURE — 63600175 PHARM REV CODE 636 W HCPCS: Performed by: OTOLARYNGOLOGY

## 2017-09-29 PROCEDURE — 99900026 HC AIRWAY MAINTENANCE (STAT)

## 2017-09-29 PROCEDURE — 97535 SELF CARE MNGMENT TRAINING: CPT

## 2017-09-29 RX ORDER — INSULIN ASPART 100 [IU]/ML
4 INJECTION, SOLUTION INTRAVENOUS; SUBCUTANEOUS
Status: DISCONTINUED | OUTPATIENT
Start: 2017-09-29 | End: 2017-09-30

## 2017-09-29 RX ORDER — INSULIN ASPART 100 [IU]/ML
4 INJECTION, SOLUTION INTRAVENOUS; SUBCUTANEOUS
Status: DISCONTINUED | OUTPATIENT
Start: 2017-09-30 | End: 2017-09-30

## 2017-09-29 RX ADMIN — SODIUM CHLORIDE, SODIUM LACTATE, POTASSIUM CHLORIDE, AND CALCIUM CHLORIDE: 600; 310; 30; 20 INJECTION, SOLUTION INTRAVENOUS at 01:09

## 2017-09-29 RX ADMIN — INSULIN ASPART 4 UNITS: 100 INJECTION, SOLUTION INTRAVENOUS; SUBCUTANEOUS at 04:09

## 2017-09-29 RX ADMIN — HYDROMORPHONE HYDROCHLORIDE 1 MG: 1 INJECTION, SOLUTION INTRAMUSCULAR; INTRAVENOUS; SUBCUTANEOUS at 08:09

## 2017-09-29 RX ADMIN — LEVOTHYROXINE SODIUM 112 MCG: 112 TABLET ORAL at 06:09

## 2017-09-29 RX ADMIN — INSULIN DETEMIR 3 UNITS: 100 INJECTION, SOLUTION SUBCUTANEOUS at 08:09

## 2017-09-29 RX ADMIN — HYDROMORPHONE HYDROCHLORIDE 1 MG: 1 INJECTION, SOLUTION INTRAMUSCULAR; INTRAVENOUS; SUBCUTANEOUS at 02:09

## 2017-09-29 RX ADMIN — FAMOTIDINE 20 MG: 40 POWDER, FOR SUSPENSION ORAL at 08:09

## 2017-09-29 RX ADMIN — INSULIN DETEMIR 3 UNITS: 100 INJECTION, SOLUTION SUBCUTANEOUS at 12:09

## 2017-09-29 RX ADMIN — INSULIN ASPART 3 UNITS: 100 INJECTION, SOLUTION INTRAVENOUS; SUBCUTANEOUS at 04:09

## 2017-09-29 RX ADMIN — INSULIN ASPART 3 UNITS: 100 INJECTION, SOLUTION INTRAVENOUS; SUBCUTANEOUS at 08:09

## 2017-09-29 RX ADMIN — INSULIN ASPART 4 UNITS: 100 INJECTION, SOLUTION INTRAVENOUS; SUBCUTANEOUS at 12:09

## 2017-09-29 RX ADMIN — Medication 400 MG: at 08:09

## 2017-09-29 RX ADMIN — BACITRACIN: 500 OINTMENT TOPICAL at 02:09

## 2017-09-29 RX ADMIN — CALCIUM CARBONATE 1000 MG: 1250 SUSPENSION ORAL at 02:09

## 2017-09-29 RX ADMIN — BACITRACIN: 500 OINTMENT TOPICAL at 08:09

## 2017-09-29 RX ADMIN — CALCIUM CARBONATE 1000 MG: 1250 SUSPENSION ORAL at 06:09

## 2017-09-29 RX ADMIN — INSULIN ASPART 4 UNITS: 100 INJECTION, SOLUTION INTRAVENOUS; SUBCUTANEOUS at 08:09

## 2017-09-29 RX ADMIN — INSULIN ASPART 5 UNITS: 100 INJECTION, SOLUTION INTRAVENOUS; SUBCUTANEOUS at 08:09

## 2017-09-29 RX ADMIN — IOHEXOL 50 ML: 350 INJECTION, SOLUTION INTRAVENOUS at 09:09

## 2017-09-29 RX ADMIN — Medication 400 MG: at 09:09

## 2017-09-29 RX ADMIN — CALCITRIOL 0.25 MCG: 1 SOLUTION ORAL at 08:09

## 2017-09-29 RX ADMIN — ENOXAPARIN SODIUM 40 MG: 100 INJECTION SUBCUTANEOUS at 04:09

## 2017-09-29 RX ADMIN — CALCIUM CARBONATE 1000 MG: 1250 SUSPENSION ORAL at 09:09

## 2017-09-29 RX ADMIN — BACITRACIN: 500 OINTMENT TOPICAL at 06:09

## 2017-09-29 NOTE — PROGRESS NOTES
[facility]  Otorhinolaryngology-Head & Neck Surgery  Progress Note    Subjective:     Post-Op Info:  Procedure(s) (LRB):  DISSECTION-NECK (Right)  LARYNGOSCOPY (N/A)  FLAP-ROTATION (N/A)  LARYNGECTOMY (N/A)  DISSECTION-NECK (Left)   7 Days Post-Op  Hospital Day: 8     Interval History: No acute issues over night, BS elevated during day yesterday again, treated accd to prescribed regimen, good response.  C/o swelling of right arm.    Medications:  Continuous Infusions:   lactated ringers 65 mL/hr at 09/27/17 1726     Scheduled Meds:   bacitracin   Topical (Top) TID    calcitriol  0.25 mcg Oral Daily    calcium carbonate  1,000 mg Per G Tube TID    enoxaparin  40 mg Subcutaneous Daily    famotidine  20 mg Per G Tube BID    insulin aspart  3 Units Subcutaneous Q24H    insulin aspart  3 Units Subcutaneous Q24H    insulin aspart  3 Units Subcutaneous Q24H    insulin aspart  3 Units Subcutaneous Q24H    insulin aspart  3 Units Subcutaneous Q24H    insulin aspart  3 Units Subcutaneous Q24H    insulin detemir  3 Units Subcutaneous BID    levothyroxine  112 mcg Per G Tube Before breakfast    magnesium oxide  400 mg Oral BID     PRN Meds:calcium gluconate IVPB, calcium gluconate IVPB, calcium gluconate IVPB, dextrose 50%, diphenhydrAMINE, glucagon (human recombinant), hydrALAZINE, hydrocodone-apap 7.5-325 MG/15 ML, HYDROmorphone, HYDROmorphone, influenza, insulin aspart, magnesium sulfate IVPB, magnesium sulfate IVPB, ondansetron, potassium chloride **AND** potassium chloride **AND** potassium chloride, promethazine (PHENERGAN) IVPB     Review of patient's allergies indicates:  No Known Allergies  Objective:     Vital Signs (24h Range):  Temp:  [96.4 °F (35.8 °C)-98.7 °F (37.1 °C)] 97.9 °F (36.6 °C)  Pulse:  [66-77] 66  Resp:  [16-20] 20  SpO2:  [96 %-100 %] 99 %  BP: (100-129)/(55-81) 118/77        Lines/Drains/Airways     Drain                 Gastrostomy/Enterostomy 08/31/17 1900 28 days          Closed/Suction Drain 09/22/17 1708 Medial Neck Bulb 15 Fr. 6 days         Closed/Suction Drain 09/22/17 1708 Right Neck Bulb 15 Fr. 6 days          Airway                 Surgical Airway 09/24/17 1300 4 days          Peripheral Intravenous Line                 Peripheral IV - Single Lumen 09/27/17 1724 Left Forearm 1 day                Physical Exam  Lying supine in bed, head elevated  Breathing comfortably with humidified oxygen via aleksandra tube  Laryngectomy tube with secretions, cleaned, HME in place  Stoma healthy, intact  Right chest incision without fluid collection, erythema, tenderness; staples intact  Right arm pitting edema without erythema or tenderness    Significant Labs:  BMP:   Recent Labs  Lab 09/29/17  0402   *   CL 89*   CO2 30*   BUN 15   CREATININE 0.8   CALCIUM 7.0*   MG 1.2*     CBC:   Recent Labs  Lab 09/25/17  0402   WBC 13.16*   RBC 2.84*   HGB 8.8*   HCT 26.1*   *   MCV 92   MCH 31.0   MCHC 33.7       Significant Diagnostics:  None    Assessment/Plan:     * Laryngeal cancer    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    - advance to day 7 of the pathway  - esophagram today  - HUNTER care per ENT MDs  - aleksandra tube in stoma c HME  - consulted Hem/Onc Psychology (see note 9/25/17) - no interventions  - HDS  - no abx needed  - continuous TF via G tube per pathway               -Diabetasource AC goal of 60 cc/hr per nutrition recs  - Endo: postop PTH <5   rocaltrol, calcium carbonate   -synthroid 55 mcg IV started  - T1DM hyperglycemia   -insulin per endocrine mgmnt  - Pt/OT; OOB, PPI, Lovenox  - Case mgmt consulted for d/c planning   -needs PT/OT/SLP, supplies for laryngectomy tube and tube feeds            Kayleigh Patel MD  Otorhinolaryngology-Head & Neck Surgery  [facility]

## 2017-09-29 NOTE — SUBJECTIVE & OBJECTIVE
"Interval HPI:   Trach in place, pt very agitated, would not communicate with provider today  BG poorly controlled  Eating:   NPO  Nausea: No  Hypoglycemia and intervention: No  Fever: No  TF: Yes   If yes, type of TF/TPN and rate: 60cc/hr Diabetasource    /78 (BP Location: Left arm, Patient Position: Lying)   Pulse 74   Temp 97.3 °F (36.3 °C) (Oral)   Resp 14   Ht 5' 11" (1.803 m)   Wt 60 kg (132 lb 4.4 oz)   SpO2 99%   BMI 18.45 kg/m²     Labs Reviewed and Include      Recent Labs  Lab 09/29/17  0402   *   CALCIUM 7.0*   ALBUMIN 2.2*   PROT 6.3   *   K 4.5   CO2 30*   CL 89*   BUN 15   CREATININE 0.8   ALKPHOS 67   ALT 18   AST 19   BILITOT 0.3     Lab Results   Component Value Date    WBC 13.16 (H) 09/25/2017    HGB 8.8 (L) 09/25/2017    HCT 26.1 (L) 09/25/2017    MCV 92 09/25/2017     (H) 09/25/2017     No results for input(s): TSH, FREET4 in the last 168 hours.  Lab Results   Component Value Date    HGBA1C 9.6 (H) 09/03/2017       Nutritional status:   Body mass index is 18.45 kg/m².  Lab Results   Component Value Date    ALBUMIN 2.2 (L) 09/29/2017    ALBUMIN 2.1 (L) 09/28/2017    ALBUMIN 2.1 (L) 09/27/2017     No results found for: PREALBUMIN    Estimated Creatinine Clearance: 95.8 mL/min (based on SCr of 0.8 mg/dL).    Accu-Checks  Recent Labs      09/28/17   1126  09/28/17   1146  09/28/17   1539  09/28/17   2048  09/29/17   0053  09/29/17   0057  09/29/17   0402  09/29/17   0404  09/29/17   0838  09/29/17   1202   POCTGLUCOSE  >500*  343*  341*  237*  429*  315*  418*  334*  409*  176*       Hyperglycemia/Diabetes Medications: Antihyperglycemics     Start     Stop Route Frequency Ordered    09/30/17 0800  insulin aspart pen 4 Units      -- SubQ Every 24 hours (non-standard times) 09/29/17 0845    09/30/17 0400  insulin aspart pen 4 Units      -- SubQ Every 24 hours (non-standard times) 09/29/17 0845    09/30/17 0000  insulin aspart pen 4 Units      -- SubQ Every 24 hours " (non-standard times) 09/29/17 0845    09/29/17 2000  insulin aspart pen 4 Units      -- SubQ Every 24 hours (non-standard times) 09/29/17 0845    09/29/17 1600  insulin aspart pen 4 Units      -- SubQ Every 24 hours (non-standard times) 09/29/17 0845    09/29/17 1200  insulin aspart pen 4 Units      -- SubQ Every 24 hours (non-standard times) 09/29/17 0845    09/29/17 0845  insulin detemir pen 5 Units      -- SubQ 2 times daily 09/29/17 0844    09/28/17 1655  insulin aspart pen 0-5 Units      -- SubQ Every 4 hours PRN 09/28/17 3441

## 2017-09-29 NOTE — ASSESSMENT & PLAN NOTE
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    - advance to day 7 of the pathway  - esophagram today  - HUNTER care per ENT MDs  - aleksandra tube in stoma c HME  - consulted Hem/Onc Psychology (see note 9/25/17) - no interventions  - HDS  - no abx needed  - continuous TF via G tube per pathway               -Diabetasource AC goal of 60 cc/hr per nutrition recs  - Endo: postop PTH <5   rocaltrol, calcium carbonate   -synthroid 55 mcg IV started  - T1DM hyperglycemia   -insulin per endocrine mgmnt  - Pt/OT; OOB, PPI, Lovenox  - Case mgmt consulted for d/c planning   -needs PT/OT/SLP, supplies for laryngectomy tube and tube feeds

## 2017-09-29 NOTE — PLAN OF CARE
Problem: SLP Goal  Goal: SLP Goal  Speech Language Pathology Goals  Goals expected to be met by 10/1    1. Pt/family will actively participate in post-laryngectomy education to facilitate Perkins and desensitization  2. Pt will communicate contextualized phrases via AL with approximately 60% intelligibility provided min cues to improve communication.  3. Pt/family will perform stoma care x1 per session provided min cues to facilitate Perkins.               Pt with slow progress towards goals     Anne Marie Solis MS, CCC-SLP  Speech Language Pathologist  Pager: [phone]  Date 9/29/2017

## 2017-09-29 NOTE — PLAN OF CARE
Problem: Patient Care Overview  Goal: Plan of Care Review  Outcome: Ongoing (interventions implemented as appropriate)  Plan of care reviewed with pt. Pt AAOx's 4, vital signs stable. Pt tolerating clear liquid diet well. Pt ambulates independently and remains free of falls. Tube feeds running continuously.  Pain  Relieved with prn meds. Pt on 5l O2 per trache collar. No acute events at this time. Bed in low and locked position with call light in reach. TM

## 2017-09-29 NOTE — PLAN OF CARE
PLAN OF CARE    Patient needs tube feeds for home use to be administered through his gastrostomy tube. Please provide Diabetasource AC (or equivalent) 300 cc five times daily (time per patient preference). This will provide 1800 kcal/day, 90 g protein/day, and 1227 mL free fluid/day.   Patient should also receive 75 cc free water flushes before and after each feeding.    Kayleigh Patel MD

## 2017-09-29 NOTE — PLAN OF CARE
SMILEY spoke to Anne Marie with Speech re: Pt. Anne Marie has concerns about Pt's ability to care for his laryngectomy. She stated he has not been a willing participant in his care. SMILEY shared these concerns with ENT Resident. Pt got a swallow study this morning, but it is not yet resulted. ENT Resident stated he doubted Pt would be ready for discharge until Monday. SMILEY placed call to Lisy, Pt's sister ([phone]), and left a voicemail requesting a return call regarding discharge planning, etc. SMILEY to continue to follow. Pt has a home suction machine supplied through [employer]. Pt's need for tube feeds is still TBD until swallow study results are available. SMILEY to continue to follow.     Shirley Sierra LCSW

## 2017-09-29 NOTE — PLAN OF CARE
Problem: Patient Care Overview  Goal: Plan of Care Review  Outcome: Ongoing (interventions implemented as appropriate)  VS stable.C/o pain in the neck. HUNTER drains with minimal output. Glucose 300's overnight. Covered with scheduled Novolog. TF at 60ml/hr, 40ml residual. Tolerated well. Will continue to monitor.

## 2017-09-29 NOTE — PROGRESS NOTES
"[facility]  Endocrinology  Progress Note    Admit Date: 9/22/2017     Reason for Consult: Management of T2DM     Surgical Procedure and Date: PEG/trach 8/30/17; total laryngectomy, bilateral neck dissection (II-IV, VI), total thyroidectomy 9/22/17     Diabetes diagnosis year: 2009     Home Diabetes Medications: lantus 20 units qhs; Novolog 4 units for TF boluses (Diabetisource 5x/day)  Novolog 6 units AC; novolog 10 units for TF bolus + meal      How often checking glucose at home? 1-3 x day       Diabetes Complications include: Hyperglycemia and Diabetic peripheral neuropathy      Complicating diabetes co morbidities: Active Cancer    HPI: Patient is a 47 y.o. male with a diagnosis of uncontrolled T2DM, s/p laryngectomy for SCC of larynx on 9/22, postsurgical hypothyroidism, S/p trach and peg, on enteral nutrition and diet as noted in above regimen at home. Endo consulted for post-op hyperglycemia.     Interval HPI:   Trach in place, pt very agitated, would not communicate with provider today  BG poorly controlled  Eating:   NPO  Nausea: No  Hypoglycemia and intervention: No  Fever: No  TF: Yes   If yes, type of TF/TPN and rate: 60cc/hr Diabetasource    /78 (BP Location: Left arm, Patient Position: Lying)   Pulse 74   Temp 97.3 °F (36.3 °C) (Oral)   Resp 14   Ht 5' 11" (1.803 m)   Wt 60 kg (132 lb 4.4 oz)   SpO2 99%   BMI 18.45 kg/m²       Labs Reviewed and Include      Recent Labs  Lab 09/29/17  0402   *   CALCIUM 7.0*   ALBUMIN 2.2*   PROT 6.3   *   K 4.5   CO2 30*   CL 89*   BUN 15   CREATININE 0.8   ALKPHOS 67   ALT 18   AST 19   BILITOT 0.3     Lab Results   Component Value Date    WBC 13.16 (H) 09/25/2017    HGB 8.8 (L) 09/25/2017    HCT 26.1 (L) 09/25/2017    MCV 92 09/25/2017     (H) 09/25/2017     No results for input(s): TSH, FREET4 in the last 168 hours.  Lab Results   Component Value Date    HGBA1C 9.6 (H) 09/03/2017       Nutritional status:   Body " mass index is 18.45 kg/m².  Lab Results   Component Value Date    ALBUMIN 2.2 (L) 09/29/2017    ALBUMIN 2.1 (L) 09/28/2017    ALBUMIN 2.1 (L) 09/27/2017     No results found for: PREALBUMIN    Estimated Creatinine Clearance: 95.8 mL/min (based on SCr of 0.8 mg/dL).    Accu-Checks  Recent Labs      09/28/17   1126  09/28/17   1146  09/28/17   1539  09/28/17   2048  09/29/17   0053  09/29/17   0057  09/29/17   0402  09/29/17   0404  09/29/17   0838  09/29/17   1202   POCTGLUCOSE  >500*  343*  341*  237*  429*  315*  418*  334*  409*  176*       Hyperglycemia/Diabetes Medications: Antihyperglycemics     Start     Stop Route Frequency Ordered    09/30/17 0800  insulin aspart pen 4 Units      -- SubQ Every 24 hours (non-standard times) 09/29/17 0845    09/30/17 0400  insulin aspart pen 4 Units      -- SubQ Every 24 hours (non-standard times) 09/29/17 0845    09/30/17 0000  insulin aspart pen 4 Units      -- SubQ Every 24 hours (non-standard times) 09/29/17 0845    09/29/17 2000  insulin aspart pen 4 Units      -- SubQ Every 24 hours (non-standard times) 09/29/17 0845    09/29/17 1600  insulin aspart pen 4 Units      -- SubQ Every 24 hours (non-standard times) 09/29/17 0845    09/29/17 1200  insulin aspart pen 4 Units      -- SubQ Every 24 hours (non-standard times) 09/29/17 0845    09/29/17 0845  insulin detemir pen 5 Units      -- SubQ 2 times daily 09/29/17 0844    09/28/17 1655  insulin aspart pen 0-5 Units      -- SubQ Every 4 hours PRN 09/28/17 1556          ASSESSMENT and PLAN    * Laryngeal cancer    Per ENT           Type 1 diabetes mellitus with hyperglycemia    BG goal 140-180. Increase levemir 5 u bid, Increase Novolog 4 u q4h with low dose correction scale.  BG monitoring q4h     9/26/17 Cpeptide < 0.08, - now T1DM.    MINO pending.      Dispo: on-going  Has Lantus and Novolog supply  Recommend following up with endocrine for long term management.          Dysphagia, oropharyngeal    NPO on continuous TF            On enteral nutrition    May increase insulin needs  TF @ goal 60 ml/hr          Hypothyroidism, postsurgical    On Levothyroxine 112 mcg daily.   Check FT4 in 4 weeks (~10/22)            MAYO Cassidy,ANP-C  Endocrinology  [facility]

## 2017-09-29 NOTE — SUBJECTIVE & OBJECTIVE
Interval History: No acute issues over night, BS elevated during day yesterday again, treated accd to prescribed regimen, good response.  C/o swelling of right arm.    Medications:  Continuous Infusions:   lactated ringers 65 mL/hr at 09/27/17 1726     Scheduled Meds:   bacitracin   Topical (Top) TID    calcitriol  0.25 mcg Oral Daily    calcium carbonate  1,000 mg Per G Tube TID    enoxaparin  40 mg Subcutaneous Daily    famotidine  20 mg Per G Tube BID    insulin aspart  3 Units Subcutaneous Q24H    insulin aspart  3 Units Subcutaneous Q24H    insulin aspart  3 Units Subcutaneous Q24H    insulin aspart  3 Units Subcutaneous Q24H    insulin aspart  3 Units Subcutaneous Q24H    insulin aspart  3 Units Subcutaneous Q24H    insulin detemir  3 Units Subcutaneous BID    levothyroxine  112 mcg Per G Tube Before breakfast    magnesium oxide  400 mg Oral BID     PRN Meds:calcium gluconate IVPB, calcium gluconate IVPB, calcium gluconate IVPB, dextrose 50%, diphenhydrAMINE, glucagon (human recombinant), hydrALAZINE, hydrocodone-apap 7.5-325 MG/15 ML, HYDROmorphone, HYDROmorphone, influenza, insulin aspart, magnesium sulfate IVPB, magnesium sulfate IVPB, ondansetron, potassium chloride **AND** potassium chloride **AND** potassium chloride, promethazine (PHENERGAN) IVPB     Review of patient's allergies indicates:  No Known Allergies  Objective:     Vital Signs (24h Range):  Temp:  [96.4 °F (35.8 °C)-98.7 °F (37.1 °C)] 97.9 °F (36.6 °C)  Pulse:  [66-77] 66  Resp:  [16-20] 20  SpO2:  [96 %-100 %] 99 %  BP: (100-129)/(55-81) 118/77        Lines/Drains/Airways     Drain                 Gastrostomy/Enterostomy 08/31/17 1900 28 days         Closed/Suction Drain 09/22/17 1708 Medial Neck Bulb 15 Fr. 6 days         Closed/Suction Drain 09/22/17 1708 Right Neck Bulb 15 Fr. 6 days          Airway                 Surgical Airway 09/24/17 1300 4 days          Peripheral Intravenous Line                 Peripheral IV -  Single Lumen 09/27/17 1724 Left Forearm 1 day                Physical Exam  Lying supine in bed, head elevated  Breathing comfortably with humidified oxygen via aleksandra tube  Laryngectomy tube with secretions, cleaned, HME in place  Stoma healthy, intact  Right chest incision without fluid collection, erythema, tenderness; staples intact  Right arm pitting edema without erythema or tenderness    Significant Labs:  BMP:   Recent Labs  Lab 09/29/17  0402   *   CL 89*   CO2 30*   BUN 15   CREATININE 0.8   CALCIUM 7.0*   MG 1.2*     CBC:   Recent Labs  Lab 09/25/17  0402   WBC 13.16*   RBC 2.84*   HGB 8.8*   HCT 26.1*   *   MCV 92   MCH 31.0   MCHC 33.7       Significant Diagnostics:  None

## 2017-09-29 NOTE — PLAN OF CARE
POD # 7 S/P neck dissection , flap rotation and laryngectomy. Pt. on continuous tube feedings. D/C planning in progress. Anticipated d/c next week. D/C planning: tube feedings and supplies for tube feedings. SW/CM will follow and facilitate d/c needs. REY Cabrera RN/CM       09/29/17 0719   Right Care Assessment   Can the patient answer the patient profile reliably? Yes, cognitively intact   How often would a person be available to care for the patient? Whenever needed   Describe the patient's ability to walk at the present time. Minor restrictions or changes   How does the patient rate their overall health at the present time? Fair   Number of comorbid conditions (as recorded on the chart) Five or more   During the past month, has the patient often been bothered by feeling down, depressed or hopeless? Yes   During the past month, has the patient often been bothered by little interest or pleasure in doing things? Yes

## 2017-09-29 NOTE — PT/OT/SLP PROGRESS
Speech Language Pathology  Treatment    Luís Esteban   MRN: [id number]   Admitting Diagnosis: Laryngeal cancer    Diet recommendations: Solid Diet Level: NPO  Liquid Diet Level: NPO   · Diet to be advanced per medical team   · Encourage independent stoma care   · Encourage AL use to communicate     SLP Treatment Date: 09/29/17  Speech Start Time: 1225     Speech Stop Time: 1244     Speech Total (min): 19 min       TREATMENT BILLABLE MINUTES:  Therapeutic Speech Device 10 and Seld Care/Home Management Training 9    Has the patient been evaluated by SLP for swallowing? : No (Pt to be advanced by medical team )      General Precautions: Standard, aspiration, fall  Current Respiratory Status: laryngectomy (neck breather)       Subjective:  Pt awake alert and willing to cooperate in therapy this session     Pain/Comfort  Pain Rating 1: 0/10  Pain Rating Post-Intervention 1: 0/10    Objective:     Attempted to see Pt earlier this AM Pt off the floor for swallow assessment. Upon second attempt Pt awake and upright in bed. Pt holding AL and reports practicing and intermittently successful.  Pt willing to participate in routine stoma care. SLP escorted Pt with bathroom for stoma care. Pt adequately removed trach neck ties and removed aleksandra tube. aleksandra tube significantly soiled with thick and dried secretions. Educated Pt on importance of twice daily  Cleaning of aleksandra tube to avoid mucus build up and maintain and clean and patent airway. Pt demonstrated understanding. Pt warranted min-mod verbal cues to continue with stoma care as he nearly cleaned aleksandra tube in hydrogen peroxide. Education provided to Pt re: danger  of cleaning aleksandra tube in hydrogen peroxide as would be caustic to his airway. Pt demonstrated understanding and proceeded with remaining aleksandra tube cleaning independently, replaced aleksandra tube, assessed/replaced HME and re-fastened neck ties. Pt otherwise meeting communication needs via gestures, mouthing of words,  facial expressions.  Pt would benefit from ongoing encouragement to complete stoma care independently and speech service to continue to follow. Pt without additional questions at this time.     Assessment:  Luís Esteban is a 47 y.o. male with a medical diagnosis of Laryngeal cancer and presents s/p total laryngectomy and seen for ongoing AL use, stoma care and post-laryngectomy education. .    Discharge recommendations: Discharge Facility/Level Of Care Needs: home health speech therapy, outpatient speech therapy     Goals:    SLP Goals        Problem: SLP Goal    Goal Priority Disciplines Outcome   SLP Goal     SLP    Description:  Speech Language Pathology Goals  Goals expected to be met by 10/1    1. Pt/family will actively participate in post-laryngectomy education to facilitate [place] and desensitization  2. Pt will communicate contextualized phrases via AL with approximately 60% intelligibility provided min cues to improve communication.  3. Pt/family will perform stoma care x1 per session provided min cues to facilitate [place].                                 Plan:   Patient to be seen Therapy Frequency: 5 x/week   Plan of Care expires: 10/23/17  Plan of Care reviewed with: patient  SLP Follow-up?: Yes         Anne Marie Solis CCC-SLP  09/29/2017

## 2017-09-30 PROBLEM — E10.649 TYPE 1 DIABETES MELLITUS WITH HYPOGLYCEMIA WITHOUT COMA: Status: ACTIVE | Noted: 2017-09-30

## 2017-09-30 PROBLEM — E83.51 HYPOCALCEMIA: Status: ACTIVE | Noted: 2017-09-30

## 2017-09-30 LAB
ALBUMIN SERPL BCP-MCNC: 2.4 G/DL
ALP SERPL-CCNC: 67 U/L
ALT SERPL W/O P-5'-P-CCNC: 18 U/L
ANION GAP SERPL CALC-SCNC: 13 MMOL/L
ANION GAP SERPL CALC-SCNC: 8 MMOL/L
AST SERPL-CCNC: 21 U/L
B-OH-BUTYR BLD STRIP-SCNC: 0.3 MMOL/L
BILIRUB SERPL-MCNC: 0.4 MG/DL
BUN SERPL-MCNC: 10 MG/DL
BUN SERPL-MCNC: 9 MG/DL
CA-I BLDV-SCNC: 0.92 MMOL/L
CALCIUM SERPL-MCNC: 7.1 MG/DL
CALCIUM SERPL-MCNC: 7.4 MG/DL
CHLORIDE SERPL-SCNC: 89 MMOL/L
CHLORIDE SERPL-SCNC: 91 MMOL/L
CO2 SERPL-SCNC: 26 MMOL/L
CO2 SERPL-SCNC: 34 MMOL/L
CREAT SERPL-MCNC: 0.8 MG/DL
CREAT SERPL-MCNC: 0.9 MG/DL
EST. GFR  (AFRICAN AMERICAN): >60 ML/MIN/1.73 M^2
EST. GFR  (AFRICAN AMERICAN): >60 ML/MIN/1.73 M^2
EST. GFR  (NON AFRICAN AMERICAN): >60 ML/MIN/1.73 M^2
EST. GFR  (NON AFRICAN AMERICAN): >60 ML/MIN/1.73 M^2
GLUCOSE SERPL-MCNC: 124 MG/DL
GLUCOSE SERPL-MCNC: 405 MG/DL
LACTATE SERPL-SCNC: 2.2 MMOL/L
MAGNESIUM SERPL-MCNC: 1.3 MG/DL
PHOSPHATE SERPL-MCNC: 2.8 MG/DL
POCT GLUCOSE: 113 MG/DL (ref 70–110)
POCT GLUCOSE: 145 MG/DL (ref 70–110)
POCT GLUCOSE: 156 MG/DL (ref 70–110)
POCT GLUCOSE: 160 MG/DL (ref 70–110)
POCT GLUCOSE: 163 MG/DL (ref 70–110)
POCT GLUCOSE: 170 MG/DL (ref 70–110)
POCT GLUCOSE: 174 MG/DL (ref 70–110)
POCT GLUCOSE: 245 MG/DL (ref 70–110)
POCT GLUCOSE: 366 MG/DL (ref 70–110)
POCT GLUCOSE: 377 MG/DL (ref 70–110)
POCT GLUCOSE: 391 MG/DL (ref 70–110)
POCT GLUCOSE: 395 MG/DL (ref 70–110)
POCT GLUCOSE: 455 MG/DL (ref 70–110)
POCT GLUCOSE: 457 MG/DL (ref 70–110)
POCT GLUCOSE: 50 MG/DL (ref 70–110)
POCT GLUCOSE: 53 MG/DL (ref 70–110)
POCT GLUCOSE: 56 MG/DL (ref 70–110)
POCT GLUCOSE: 62 MG/DL (ref 70–110)
POCT GLUCOSE: 73 MG/DL (ref 70–110)
POTASSIUM SERPL-SCNC: 4.1 MMOL/L
POTASSIUM SERPL-SCNC: 5.1 MMOL/L
PROT SERPL-MCNC: 6.5 G/DL
SODIUM SERPL-SCNC: 128 MMOL/L
SODIUM SERPL-SCNC: 133 MMOL/L

## 2017-09-30 PROCEDURE — 25000003 PHARM REV CODE 250: Performed by: NURSE PRACTITIONER

## 2017-09-30 PROCEDURE — 63600175 PHARM REV CODE 636 W HCPCS: Performed by: OTOLARYNGOLOGY

## 2017-09-30 PROCEDURE — 25000003 PHARM REV CODE 250: Performed by: STUDENT IN AN ORGANIZED HEALTH CARE EDUCATION/TRAINING PROGRAM

## 2017-09-30 PROCEDURE — 63600175 PHARM REV CODE 636 W HCPCS: Performed by: INTERNAL MEDICINE

## 2017-09-30 PROCEDURE — 83735 ASSAY OF MAGNESIUM: CPT

## 2017-09-30 PROCEDURE — 20600001 HC STEP DOWN PRIVATE ROOM

## 2017-09-30 PROCEDURE — 80048 BASIC METABOLIC PNL TOTAL CA: CPT

## 2017-09-30 PROCEDURE — 63700000 PHARM REV CODE 250 ALT 637 W/O HCPCS: Performed by: STUDENT IN AN ORGANIZED HEALTH CARE EDUCATION/TRAINING PROGRAM

## 2017-09-30 PROCEDURE — 83605 ASSAY OF LACTIC ACID: CPT

## 2017-09-30 PROCEDURE — 25000003 PHARM REV CODE 250: Performed by: INTERNAL MEDICINE

## 2017-09-30 PROCEDURE — 82010 KETONE BODYS QUAN: CPT

## 2017-09-30 PROCEDURE — 36415 COLL VENOUS BLD VENIPUNCTURE: CPT

## 2017-09-30 PROCEDURE — 63600175 PHARM REV CODE 636 W HCPCS: Performed by: ANESTHESIOLOGY

## 2017-09-30 PROCEDURE — 99232 SBSQ HOSP IP/OBS MODERATE 35: CPT | Mod: ,,, | Performed by: INTERNAL MEDICINE

## 2017-09-30 PROCEDURE — 80053 COMPREHEN METABOLIC PANEL: CPT

## 2017-09-30 PROCEDURE — 84100 ASSAY OF PHOSPHORUS: CPT

## 2017-09-30 PROCEDURE — 25000003 PHARM REV CODE 250: Performed by: OTOLARYNGOLOGY

## 2017-09-30 PROCEDURE — 82330 ASSAY OF CALCIUM: CPT

## 2017-09-30 RX ORDER — INSULIN ASPART 100 [IU]/ML
4 INJECTION, SOLUTION INTRAVENOUS; SUBCUTANEOUS
Status: DISCONTINUED | OUTPATIENT
Start: 2017-10-01 | End: 2017-09-30

## 2017-09-30 RX ORDER — INSULIN ASPART 100 [IU]/ML
4 INJECTION, SOLUTION INTRAVENOUS; SUBCUTANEOUS
Status: DISCONTINUED | OUTPATIENT
Start: 2017-09-30 | End: 2017-09-30

## 2017-09-30 RX ADMIN — SODIUM CHLORIDE 2.1 UNITS/HR: 9 INJECTION, SOLUTION INTRAVENOUS at 03:09

## 2017-09-30 RX ADMIN — ENOXAPARIN SODIUM 40 MG: 100 INJECTION SUBCUTANEOUS at 05:09

## 2017-09-30 RX ADMIN — DEXTROSE MONOHYDRATE 12.5 G: 25 INJECTION, SOLUTION INTRAVENOUS at 02:09

## 2017-09-30 RX ADMIN — FAMOTIDINE 20 MG: 40 POWDER, FOR SUSPENSION ORAL at 08:09

## 2017-09-30 RX ADMIN — BACITRACIN: 500 OINTMENT TOPICAL at 02:09

## 2017-09-30 RX ADMIN — LEVOTHYROXINE SODIUM 112 MCG: 112 TABLET ORAL at 05:09

## 2017-09-30 RX ADMIN — BACITRACIN: 500 OINTMENT TOPICAL at 09:09

## 2017-09-30 RX ADMIN — FAMOTIDINE 20 MG: 40 POWDER, FOR SUSPENSION ORAL at 09:09

## 2017-09-30 RX ADMIN — CALCIUM CARBONATE 1000 MG: 1250 SUSPENSION ORAL at 02:09

## 2017-09-30 RX ADMIN — HYDROMORPHONE HYDROCHLORIDE 1 MG: 1 INJECTION, SOLUTION INTRAMUSCULAR; INTRAVENOUS; SUBCUTANEOUS at 05:09

## 2017-09-30 RX ADMIN — HYDROMORPHONE HYDROCHLORIDE 1 MG: 1 INJECTION, SOLUTION INTRAMUSCULAR; INTRAVENOUS; SUBCUTANEOUS at 11:09

## 2017-09-30 RX ADMIN — SODIUM CHLORIDE 0.4 UNITS/HR: 9 INJECTION, SOLUTION INTRAVENOUS at 06:09

## 2017-09-30 RX ADMIN — CALCIUM CARBONATE 1000 MG: 1250 SUSPENSION ORAL at 05:09

## 2017-09-30 RX ADMIN — CALCITRIOL 0.25 MCG: 1 SOLUTION ORAL at 09:09

## 2017-09-30 RX ADMIN — CALCIUM CARBONATE 1000 MG: 1250 SUSPENSION ORAL at 09:09

## 2017-09-30 RX ADMIN — SODIUM CHLORIDE 1.5 UNITS/HR: 9 INJECTION, SOLUTION INTRAVENOUS at 02:09

## 2017-09-30 RX ADMIN — SODIUM CHLORIDE 0.8 UNITS/HR: 9 INJECTION, SOLUTION INTRAVENOUS at 05:09

## 2017-09-30 RX ADMIN — BACITRACIN: 500 OINTMENT TOPICAL at 05:09

## 2017-09-30 RX ADMIN — HYDROMORPHONE HYDROCHLORIDE 1 MG: 1 INJECTION, SOLUTION INTRAMUSCULAR; INTRAVENOUS; SUBCUTANEOUS at 06:09

## 2017-09-30 RX ADMIN — HYDROMORPHONE HYDROCHLORIDE 1 MG: 1 INJECTION, SOLUTION INTRAMUSCULAR; INTRAVENOUS; SUBCUTANEOUS at 02:09

## 2017-09-30 RX ADMIN — SODIUM CHLORIDE 1.5 UNITS/HR: 9 INJECTION, SOLUTION INTRAVENOUS at 04:09

## 2017-09-30 RX ADMIN — DEXTROSE MONOHYDRATE 12.5 G: 25 INJECTION, SOLUTION INTRAVENOUS at 03:09

## 2017-09-30 RX ADMIN — HYDROCODONE BITARTRATE AND ACETAMINOPHEN 15 ML: 7.5; 325 SOLUTION ORAL at 07:09

## 2017-09-30 RX ADMIN — INSULIN ASPART 5 UNITS: 100 INJECTION, SOLUTION INTRAVENOUS; SUBCUTANEOUS at 11:09

## 2017-09-30 RX ADMIN — SODIUM CHLORIDE 0.2 UNITS/HR: 9 INJECTION, SOLUTION INTRAVENOUS at 07:09

## 2017-09-30 NOTE — SUBJECTIVE & OBJECTIVE
Interval History: low sugars overnight requiring glucose admin; pt refused bolus TF's; patient started liquid diet yesterday after passing MBSS; issue overnight with patient attempting to eat corn flakes (either self-obtained or given by employee), but after discussion with patient he is understanding of the importance of adhering to his strict clear liquid diet; no resp issues; glory bolus TF's; sister arrived for education re Brandy and TF's before anticipated discharge Sunday or Monday    Medications:  Continuous Infusions:     Scheduled Meds:   bacitracin   Topical (Top) TID    calcitriol  0.25 mcg Oral Daily    calcium carbonate  1,000 mg Per G Tube TID    enoxaparin  40 mg Subcutaneous Daily    famotidine  20 mg Per G Tube BID    insulin aspart  4 Units Subcutaneous Q24H    insulin aspart  4 Units Subcutaneous Q24H    insulin aspart  4 Units Subcutaneous Q24H    insulin aspart  4 Units Subcutaneous Q24H    insulin aspart  4 Units Subcutaneous Q24H    insulin aspart  4 Units Subcutaneous Q24H    insulin detemir  5 Units Subcutaneous BID    levothyroxine  112 mcg Per G Tube Before breakfast     PRN Meds:calcium gluconate IVPB, calcium gluconate IVPB, calcium gluconate IVPB, dextrose 50%, diphenhydrAMINE, glucagon (human recombinant), hydrALAZINE, hydrocodone-apap 7.5-325 MG/15 ML, HYDROmorphone, HYDROmorphone, influenza, insulin aspart, magnesium sulfate IVPB, magnesium sulfate IVPB, ondansetron, potassium chloride **AND** potassium chloride **AND** potassium chloride, promethazine (PHENERGAN) IVPB     Review of patient's allergies indicates:  No Known Allergies  Objective:     Vital Signs (24h Range):  Temp:  [97.3 °F (36.3 °C)-98.1 °F (36.7 °C)] 97.9 °F (36.6 °C)  Pulse:  [64-81] 68  Resp:  [14-20] 18  SpO2:  [96 %-100 %] 100 %  BP: (111-131)/(58-79) 121/76        Lines/Drains/Airways     Drain                 Gastrostomy/Enterostomy 08/31/17 1900 29 days         Closed/Suction Drain 09/22/17 1708  Medial Neck Bulb 15 Fr. 7 days         Closed/Suction Drain 09/22/17 1708 Right Neck Bulb 15 Fr. 7 days          Airway                 Surgical Airway 09/24/17 1300 5 days          Peripheral Intravenous Line                 Peripheral IV - Single Lumen 09/30/17 0150 Left Antecubital less than 1 day                Physical Exam    Up in room, NAD  Breathing comfortably via aleksandra tube  Laryngectomy tube with secretions, cleaned, HME in place  Stoma healthy, intact  Right chest incision without fluid collection, erythema, tenderness; staples intact  Right arm pitting edema without erythema or tenderness    Significant Labs:    CBC  No results for input(s): WBC, HGB, HCT, MCV, PLT in the last 72 hours.  BMP    Recent Labs  Lab 09/28/17  0602 09/29/17  0402 09/30/17  0443   * 271* 124*   * 128* 133*   K 4.6 4.5 4.1   CL 90* 89* 91*   CO2 33* 30* 34*   BUN 14 15 10   CREATININE 0.8 0.8 0.8   CALCIUM 6.9* 7.0* 7.1*   PHOS 2.4* 2.7 2.8   MG 1.1* 1.2* 1.3*     COAGS  No results for input(s): PROTIME, INR, PTT in the last 72 hours.      Significant Diagnostics:  Esophagram: : Normal esophagram in patient who is status post laryngectomy.  No evidence of a leak or fistula (9/29/17)

## 2017-09-30 NOTE — ASSESSMENT & PLAN NOTE
May increase insulin needs    However, patient disconnected himself from TF.  Has not been reconnected since yesterday afternoon.

## 2017-09-30 NOTE — ASSESSMENT & PLAN NOTE
Patient had episode of hypoglycemia overnight after he disconnected himself from TF.    Patient was still receiving novolog without TF.  Lowest BG 50.  Responded s/p D50.      All insulin has been held.  Not restarted while off TF.  Will monitor BG.  Last dose of levemir yesterday at 2045.  Recommended to continue Levemir as scheduled as long as patient greater than 150.   Continue to hold prandial insulin as patient not receiving TF.  Once boluses have resumed, resume Novolog.  Accucheck q4H, with low correction SSI.

## 2017-09-30 NOTE — PROGRESS NOTES
"[facility]  Endocrinology  Progress Note    Admit Date: 9/22/2017     Reason for Consult: Management of T2DM     Surgical Procedure and Date: PEG/trach 8/30/17; total laryngectomy, bilateral neck dissection (II-IV, VI), total thyroidectomy 9/22/17     Diabetes diagnosis year: 2009     Home Diabetes Medications: lantus 20 units qhs; Novolog 4 units for TF boluses (Diabetisource 5x/day)  Novolog 6 units AC; novolog 10 units for TF bolus + meal      How often checking glucose at home? 1-3 x day       Diabetes Complications include: Hyperglycemia and Diabetic peripheral neuropathy      Complicating diabetes co morbidities: Active Cancer    HPI: Patient is a 47 y.o. male with a diagnosis of uncontrolled T2DM, s/p laryngectomy for SCC of larynx on 9/22, postsurgical hypothyroidism, S/p trach and peg, on enteral nutrition and diet as noted in above regimen at home. Endo consulted for post-op hyperglycemia.     Interval HPI:   Overnight events:  Patient hypoglycemic overnight.  Lowest 50.  Per nursing, patient disconnected himself from TF around 1945, however, he continued to receive both basal and prandial insulin.   Received D50 (25mL) x2.  Subsequent , but AM decreased to 124, without further administration of insulin.    AFVSS.  Patient still NPO.  TF held.  Order for TF 300cc bolus 5x/day with 50cc free water flushes before and after, but patient not currently receiving.    BP (!) 112/58   Pulse 67   Temp 97.8 °F (36.6 °C)   Resp 18   Ht 5' 11" (1.803 m)   Wt 60 kg (132 lb 4.4 oz)   SpO2 100%   BMI 18.45 kg/m²       Labs Reviewed and Include      Recent Labs  Lab 09/30/17  0443   *   CALCIUM 7.1*   ALBUMIN 2.4*   PROT 6.5   *   K 4.1   CO2 34*   CL 91*   BUN 10   CREATININE 0.8   ALKPHOS 67   ALT 18   AST 21   BILITOT 0.4     Lab Results   Component Value Date    WBC 13.16 (H) 09/25/2017    HGB 8.8 (L) 09/25/2017    HCT 26.1 (L) 09/25/2017    MCV 92 09/25/2017     " (H) 09/25/2017     No results for input(s): TSH, FREET4 in the last 168 hours.  Lab Results   Component Value Date    HGBA1C 9.6 (H) 09/03/2017       Nutritional status:   Body mass index is 18.45 kg/m².  Lab Results   Component Value Date    ALBUMIN 2.4 (L) 09/30/2017    ALBUMIN 2.2 (L) 09/29/2017    ALBUMIN 2.1 (L) 09/28/2017     No results found for: PREALBUMIN    Estimated Creatinine Clearance: 95.8 mL/min (based on SCr of 0.8 mg/dL).    Accu-Checks  Recent Labs      09/29/17   0838  09/29/17   1202  09/29/17   1612  09/29/17   2048  09/30/17   0151  09/30/17   0153  09/30/17   0156  09/30/17   0305  09/30/17   0309  09/30/17   0417   POCTGLUCOSE  409*  176*  338*  371*  73  56*  62*  50*  53*  160*       Current Medications and/or Treatments Impacting Glycemic Control  Immunotherapy:  Immunosuppressants     None        Steroids:   Hormones     None        Pressors:    Autonomic Drugs     None        Hyperglycemia/Diabetes Medications: Antihyperglycemics     Start     Stop Route Frequency Ordered    09/30/17 0800  insulin aspart pen 4 Units      -- SubQ Every 24 hours (non-standard times) 09/29/17 0845    09/30/17 0400  insulin aspart pen 4 Units      -- SubQ Every 24 hours (non-standard times) 09/29/17 0845    09/30/17 0000  insulin aspart pen 4 Units      -- SubQ Every 24 hours (non-standard times) 09/29/17 0845    09/29/17 2000  insulin aspart pen 4 Units      -- SubQ Every 24 hours (non-standard times) 09/29/17 0845    09/29/17 1600  insulin aspart pen 4 Units      -- SubQ Every 24 hours (non-standard times) 09/29/17 0845    09/29/17 1200  insulin aspart pen 4 Units      -- SubQ Every 24 hours (non-standard times) 09/29/17 0845    09/29/17 0845  insulin detemir pen 5 Units      -- SubQ 2 times daily 09/29/17 0844    09/28/17 1655  insulin aspart pen 0-5 Units      -- SubQ Every 4 hours PRN 09/28/17 7755          ASSESSMENT and PLAN    * Laryngeal cancer    Per ENT           Type 1 diabetes mellitus with  hypoglycemia without coma    Patient had episode of hypoglycemia overnight after he disconnected himself from TF.    Patient was still receiving novolog without TF.  Lowest BG 50.  Responded s/p D50.      All insulin has been held.  Not restarted while off TF.  Will monitor BG.  Last dose of levemir yesterday at 2045.  Recommended to continue Levemir as scheduled as long as patient greater than 150.   Continue to hold prandial insulin as patient not receiving TF.  Once boluses have resumed, resume Novolog.  Accucheck q4H, with low correction SSI.          Type 1 diabetes mellitus with hyperglycemia    BG goal 140-180. Hypoglycemic overnight (see hypoglycemia for further recommendations).    Once TF restarted, patient to continue Novolog 4u 5x daily (prior to TF bolus).  Continue levemir 5 u bid with low dose correction scale.  BG monitoring with TF boluses.     9/26/17 Cpeptide < 0.08, - now T1DM.    MINO positive 18.7.      Dispo: on-going  Has Lantus and Novolog supply  Recommend following up with endocrine for long term management.          Hypothyroidism, postsurgical    On Levothyroxine 112 mcg daily.   Check FT4 in 4 weeks (~10/22)        On enteral nutrition    May increase insulin needs    However, patient disconnected himself from TF.  Has not been reconnected since yesterday afternoon.          Dysphagia, oropharyngeal    NPO.  Patient to restart TF some time today.  Unclear when exactly.  Will be scheduled as 300cc bolus 5x/day.              Janette Bañuelos MD  Endocrinology  [facility], Melva Buckner MD,  have personally taken the history and examined the patient and agree with the resident's note as stated above.  May need insulin drip  Postsurgical hypoparathyroidism- increase calcitriol to bid   Follow phos

## 2017-09-30 NOTE — ASSESSMENT & PLAN NOTE
BG goal 140-180. Hypoglycemic overnight (see hypoglycemia for further recommendations).    Once TF restarted, patient to continue Novolog 4u 5x daily (prior to TF bolus).  Continue levemir 5 u bid with low dose correction scale.  BG monitoring with TF boluses.     9/26/17 Cpeptide < 0.08, - now T1DM.    MINO positive 18.7.      Dispo: on-going  Has Lantus and Novolog supply  Recommend following up with endocrine for long term management.

## 2017-09-30 NOTE — PROGRESS NOTES
Notified Dr. Meeks pt was given corn flakes and milk by PCT. This RN told him the importance of staying on a clear liquid diet. Pt refused to give food back.     MD spoke with pt over speaker phone, while on the phone PT pushed this RN away with hands, made a motion for RN to leave room.    Corn flakes taken and thrown away. MD aware.     Charge nurse notified.

## 2017-09-30 NOTE — ASSESSMENT & PLAN NOTE
ICa 0.84, cCa 8.4.  Worse from previous.    Likely post surgical hypoparathyroidism.    Recommended increasing calcitriol to 0.25mcg BID.  Continue Ca Carbonate 1000mg TID.    Phos normal today

## 2017-09-30 NOTE — PROGRESS NOTES
[facility]  Otorhinolaryngology-Head & Neck Surgery  Progress Note    Subjective:     Post-Op Info:  Procedure(s) (LRB):  DISSECTION-NECK (Right)  LARYNGOSCOPY (N/A)  FLAP-ROTATION (N/A)  LARYNGECTOMY (N/A)  DISSECTION-NECK (Left)   8 Days Post-Op  Hospital Day: 9     Interval History: low sugars overnight requiring glucose admin; pt refused bolus TF's; patient started liquid diet yesterday after passing MBSS; issue overnight with patient attempting to eat corn flakes (either self-obtained or given by employee), but after discussion with patient he is understanding of the importance of adhering to his strict clear liquid diet; no resp issues; glory bolus TF's; sister arrived for education re Brandy and TF's before anticipated discharge Sunday or Monday    Medications:  Continuous Infusions:     Scheduled Meds:   bacitracin   Topical (Top) TID    calcitriol  0.25 mcg Oral Daily    calcium carbonate  1,000 mg Per G Tube TID    enoxaparin  40 mg Subcutaneous Daily    famotidine  20 mg Per G Tube BID    insulin aspart  4 Units Subcutaneous Q24H    insulin aspart  4 Units Subcutaneous Q24H    insulin aspart  4 Units Subcutaneous Q24H    insulin aspart  4 Units Subcutaneous Q24H    insulin aspart  4 Units Subcutaneous Q24H    insulin aspart  4 Units Subcutaneous Q24H    insulin detemir  5 Units Subcutaneous BID    levothyroxine  112 mcg Per G Tube Before breakfast     PRN Meds:calcium gluconate IVPB, calcium gluconate IVPB, calcium gluconate IVPB, dextrose 50%, diphenhydrAMINE, glucagon (human recombinant), hydrALAZINE, hydrocodone-apap 7.5-325 MG/15 ML, HYDROmorphone, HYDROmorphone, influenza, insulin aspart, magnesium sulfate IVPB, magnesium sulfate IVPB, ondansetron, potassium chloride **AND** potassium chloride **AND** potassium chloride, promethazine (PHENERGAN) IVPB     Review of patient's allergies indicates:  No Known Allergies  Objective:     Vital Signs (24h Range):  Temp:  [97.3 °F  (36.3 °C)-98.1 °F (36.7 °C)] 97.9 °F (36.6 °C)  Pulse:  [64-81] 68  Resp:  [14-20] 18  SpO2:  [96 %-100 %] 100 %  BP: (111-131)/(58-79) 121/76        Lines/Drains/Airways     Drain                 Gastrostomy/Enterostomy 08/31/17 1900 29 days         Closed/Suction Drain 09/22/17 1708 Medial Neck Bulb 15 Fr. 7 days         Closed/Suction Drain 09/22/17 1708 Right Neck Bulb 15 Fr. 7 days          Airway                 Surgical Airway 09/24/17 1300 5 days          Peripheral Intravenous Line                 Peripheral IV - Single Lumen 09/30/17 0150 Left Antecubital less than 1 day                Physical Exam    Up in room, NAD  Breathing comfortably via aleksandra tube  Laryngectomy tube with secretions, cleaned, HME in place  Stoma healthy, intact  Right chest incision without fluid collection, erythema, tenderness; staples intact  Right arm pitting edema without erythema or tenderness    Significant Labs:    CBC  No results for input(s): WBC, HGB, HCT, MCV, PLT in the last 72 hours.  BMP    Recent Labs  Lab 09/28/17  0602 09/29/17  0402 09/30/17  0443   * 271* 124*   * 128* 133*   K 4.6 4.5 4.1   CL 90* 89* 91*   CO2 33* 30* 34*   BUN 14 15 10   CREATININE 0.8 0.8 0.8   CALCIUM 6.9* 7.0* 7.1*   PHOS 2.4* 2.7 2.8   MG 1.1* 1.2* 1.3*     COAGS  No results for input(s): PROTIME, INR, PTT in the last 72 hours.      Significant Diagnostics:  Esophagram: : Normal esophagram in patient who is status post laryngectomy.  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    - advance to day 8 of the pathway  - HUNTER care per ENT MDs  - aleksandra tube in stoma c HME  - consulted Hem/Onc Psychology (see note 9/25/17) - no interventions  - HDS  - no abx needed  - passed esophagram 9/29/17   Full liquid diet 2 weeks   bolus TF via G tube                -Diabetasource  cc 5x/day c 50 FWF H20 before and after  - Endo: postop PTH <5    rocaltrol, calcium carbonate   -synthroid 112 mcg per g tube;    - T1DM hyperglycemia    -insulin per endocrine mgmnt    -discharge/home recs pending  - Pt/OT; OOB, PPI, Lovenox    - Case mgmt consulted for d/c planning   -needs PT/OT/SLP, supplies for laryngectomy tube and tube feeds   -anticipate d/c Sunday or Monday pending family education (sister here today)            Wiliam Meeks MD  Otorhinolaryngology-Head & Neck Surgery  [facility]

## 2017-09-30 NOTE — SUBJECTIVE & OBJECTIVE
"Interval HPI:   Overnight events:  Patient hypoglycemic overnight.  Lowest 50.  Per nursing, patient disconnected himself from TF around 1945, however, he continued to receive both basal and prandial insulin.   Received D50 (25mL) x2.  Subsequent , but AM decreased to 124, without further administration of insulin.    AFVSS.  Patient still NPO.  TF held.  Order for TF 300cc bolus 5x/day with 50cc free water flushes before and after, but patient not currently receiving.    BP (!) 112/58   Pulse 67   Temp 97.8 °F (36.6 °C)   Resp 18   Ht 5' 11" (1.803 m)   Wt 60 kg (132 lb 4.4 oz)   SpO2 100%   BMI 18.45 kg/m²     Labs Reviewed and Include      Recent Labs  Lab 09/30/17  0443   *   CALCIUM 7.1*   ALBUMIN 2.4*   PROT 6.5   *   K 4.1   CO2 34*   CL 91*   BUN 10   CREATININE 0.8   ALKPHOS 67   ALT 18   AST 21   BILITOT 0.4     Lab Results   Component Value Date    WBC 13.16 (H) 09/25/2017    HGB 8.8 (L) 09/25/2017    HCT 26.1 (L) 09/25/2017    MCV 92 09/25/2017     (H) 09/25/2017     No results for input(s): TSH, FREET4 in the last 168 hours.  Lab Results   Component Value Date    HGBA1C 9.6 (H) 09/03/2017       Nutritional status:   Body mass index is 18.45 kg/m².  Lab Results   Component Value Date    ALBUMIN 2.4 (L) 09/30/2017    ALBUMIN 2.2 (L) 09/29/2017    ALBUMIN 2.1 (L) 09/28/2017     No results found for: PREALBUMIN    Estimated Creatinine Clearance: 95.8 mL/min (based on SCr of 0.8 mg/dL).    Accu-Checks  Recent Labs      09/29/17   0838  09/29/17   1202  09/29/17   1612  09/29/17   2048  09/30/17   0151  09/30/17   0153  09/30/17   0156  09/30/17   0305  09/30/17   0309  09/30/17   0417   POCTGLUCOSE  409*  176*  338*  371*  73  56*  62*  50*  53*  160*       Current Medications and/or Treatments Impacting Glycemic Control  Immunotherapy:  Immunosuppressants     None        Steroids:   Hormones     None        Pressors:    Autonomic Drugs     None        Hyperglycemia/Diabetes " Medications: Antihyperglycemics     Start     Stop Route Frequency Ordered    09/30/17 0800  insulin aspart pen 4 Units      -- SubQ Every 24 hours (non-standard times) 09/29/17 0845    09/30/17 0400  insulin aspart pen 4 Units      -- SubQ Every 24 hours (non-standard times) 09/29/17 0845    09/30/17 0000  insulin aspart pen 4 Units      -- SubQ Every 24 hours (non-standard times) 09/29/17 0845    09/29/17 2000  insulin aspart pen 4 Units      -- SubQ Every 24 hours (non-standard times) 09/29/17 0845    09/29/17 1600  insulin aspart pen 4 Units      -- SubQ Every 24 hours (non-standard times) 09/29/17 0845    09/29/17 1200  insulin aspart pen 4 Units      -- SubQ Every 24 hours (non-standard times) 09/29/17 0845    09/29/17 0845  insulin detemir pen 5 Units      -- SubQ 2 times daily 09/29/17 0844    09/28/17 1655  insulin aspart pen 0-5 Units      -- SubQ Every 4 hours PRN 09/28/17 1558

## 2017-09-30 NOTE — ASSESSMENT & PLAN NOTE
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    - advance to day 8 of the pathway  - HUNTER care per ENT MDs  - aleksandra tube in stoma c HME  - consulted Hem/Onc Psychology (see note 9/25/17) - no interventions  - HDS  - no abx needed  - passed eso[phagram 9/29/17   Clear liquid diet 2 weeks   bolus TF via G tube               -Diabetasource  cc 5x/day c 50 FWF H20 before and after  - Endo: postop PTH <5    rocaltrol, calcium carbonate   -synthroid 112 mcg per g tube;    - T1DM hyperglycemia    -insulin per endocrine mgmnt    -discharge/home recs pending  - Pt/OT; OOB, PPI, Lovenox    - Case mgmt consulted for d/c planning   -needs PT/OT/SLP, supplies for laryngectomy tube and tube feeds   -anticipate d/c Sunday or Monday pending family education (sister here today)

## 2017-09-30 NOTE — PROGRESS NOTES
Notified Dr. Meeks about pt's BG of 50 after D50 administration. Dr. Meeks ordered for another dose of D50 to be administered. Will administer and continue to monitor.

## 2017-09-30 NOTE — PROGRESS NOTES
Notified Dr. Meeks, upon entering room with day RN, Pt unhooked himself from his TF. PT pointing towards stomach, states he is in pain, refusing TF. MD states hold off on TF until tomorrow.

## 2017-09-30 NOTE — ASSESSMENT & PLAN NOTE
NPO.  Patient to restart TF some time today.  Unclear when exactly.  Will be scheduled as 300cc bolus 5x/day.

## 2017-10-01 LAB
ALBUMIN SERPL BCP-MCNC: 2.3 G/DL
ALP SERPL-CCNC: 62 U/L
ALT SERPL W/O P-5'-P-CCNC: 19 U/L
ANION GAP SERPL CALC-SCNC: 7 MMOL/L
AST SERPL-CCNC: 26 U/L
BILIRUB SERPL-MCNC: 0.3 MG/DL
BUN SERPL-MCNC: 8 MG/DL
CA-I BLDV-SCNC: 0.84 MMOL/L
CALCIUM SERPL-MCNC: 7 MG/DL
CHLORIDE SERPL-SCNC: 93 MMOL/L
CO2 SERPL-SCNC: 31 MMOL/L
CREAT SERPL-MCNC: 0.8 MG/DL
EST. GFR  (AFRICAN AMERICAN): >60 ML/MIN/1.73 M^2
EST. GFR  (NON AFRICAN AMERICAN): >60 ML/MIN/1.73 M^2
GLUCOSE SERPL-MCNC: 152 MG/DL
LACTATE SERPL-SCNC: 0.6 MMOL/L
MAGNESIUM SERPL-MCNC: 1.4 MG/DL
PHOSPHATE SERPL-MCNC: 4 MG/DL
POCT GLUCOSE: 115 MG/DL (ref 70–110)
POCT GLUCOSE: 135 MG/DL (ref 70–110)
POCT GLUCOSE: 144 MG/DL (ref 70–110)
POCT GLUCOSE: 145 MG/DL (ref 70–110)
POCT GLUCOSE: 154 MG/DL (ref 70–110)
POCT GLUCOSE: 162 MG/DL (ref 70–110)
POCT GLUCOSE: 165 MG/DL (ref 70–110)
POCT GLUCOSE: 169 MG/DL (ref 70–110)
POCT GLUCOSE: 174 MG/DL (ref 70–110)
POCT GLUCOSE: 175 MG/DL (ref 70–110)
POCT GLUCOSE: 194 MG/DL (ref 70–110)
POCT GLUCOSE: 217 MG/DL (ref 70–110)
POCT GLUCOSE: 58 MG/DL (ref 70–110)
POCT GLUCOSE: 67 MG/DL (ref 70–110)
POCT GLUCOSE: 81 MG/DL (ref 70–110)
POTASSIUM SERPL-SCNC: 4 MMOL/L
PROT SERPL-MCNC: 6.2 G/DL
SODIUM SERPL-SCNC: 131 MMOL/L

## 2017-10-01 PROCEDURE — 25000003 PHARM REV CODE 250: Performed by: STUDENT IN AN ORGANIZED HEALTH CARE EDUCATION/TRAINING PROGRAM

## 2017-10-01 PROCEDURE — 25000003 PHARM REV CODE 250

## 2017-10-01 PROCEDURE — 25000003 PHARM REV CODE 250: Performed by: INTERNAL MEDICINE

## 2017-10-01 PROCEDURE — 63600175 PHARM REV CODE 636 W HCPCS: Performed by: OTOLARYNGOLOGY

## 2017-10-01 PROCEDURE — 63700000 PHARM REV CODE 250 ALT 637 W/O HCPCS

## 2017-10-01 PROCEDURE — 25000003 PHARM REV CODE 250: Performed by: OTOLARYNGOLOGY

## 2017-10-01 PROCEDURE — 20600001 HC STEP DOWN PRIVATE ROOM

## 2017-10-01 PROCEDURE — 99232 SBSQ HOSP IP/OBS MODERATE 35: CPT | Mod: ,,, | Performed by: INTERNAL MEDICINE

## 2017-10-01 PROCEDURE — 36415 COLL VENOUS BLD VENIPUNCTURE: CPT

## 2017-10-01 PROCEDURE — 83605 ASSAY OF LACTIC ACID: CPT

## 2017-10-01 PROCEDURE — 80053 COMPREHEN METABOLIC PANEL: CPT

## 2017-10-01 PROCEDURE — 25000003 PHARM REV CODE 250: Performed by: ANESTHESIOLOGY

## 2017-10-01 PROCEDURE — 27000221 HC OXYGEN, UP TO 24 HOURS

## 2017-10-01 PROCEDURE — 94761 N-INVAS EAR/PLS OXIMETRY MLT: CPT

## 2017-10-01 PROCEDURE — 63600175 PHARM REV CODE 636 W HCPCS: Performed by: INTERNAL MEDICINE

## 2017-10-01 PROCEDURE — 63600175 PHARM REV CODE 636 W HCPCS: Performed by: ANESTHESIOLOGY

## 2017-10-01 PROCEDURE — 84100 ASSAY OF PHOSPHORUS: CPT

## 2017-10-01 PROCEDURE — 25000003 PHARM REV CODE 250: Performed by: NURSE PRACTITIONER

## 2017-10-01 PROCEDURE — 83735 ASSAY OF MAGNESIUM: CPT

## 2017-10-01 PROCEDURE — 82330 ASSAY OF CALCIUM: CPT

## 2017-10-01 RX ORDER — LANOLIN ALCOHOL/MO/W.PET/CERES
400 CREAM (GRAM) TOPICAL 2 TIMES DAILY
Status: DISCONTINUED | OUTPATIENT
Start: 2017-10-01 | End: 2017-10-02 | Stop reason: HOSPADM

## 2017-10-01 RX ORDER — INSULIN ASPART 100 [IU]/ML
0-5 INJECTION, SOLUTION INTRAVENOUS; SUBCUTANEOUS
Status: DISCONTINUED | OUTPATIENT
Start: 2017-10-01 | End: 2017-10-02

## 2017-10-01 RX ORDER — IBUPROFEN 200 MG
16 TABLET ORAL
Status: DISCONTINUED | OUTPATIENT
Start: 2017-10-01 | End: 2017-10-02

## 2017-10-01 RX ORDER — IBUPROFEN 200 MG
24 TABLET ORAL
Status: DISCONTINUED | OUTPATIENT
Start: 2017-10-01 | End: 2017-10-02

## 2017-10-01 RX ORDER — CALCITRIOL 1 UG/ML
0.25 SOLUTION ORAL 2 TIMES DAILY
Status: DISCONTINUED | OUTPATIENT
Start: 2017-10-01 | End: 2017-10-02

## 2017-10-01 RX ADMIN — FAMOTIDINE 20 MG: 40 POWDER, FOR SUSPENSION ORAL at 08:10

## 2017-10-01 RX ADMIN — HYDROCODONE BITARTRATE AND ACETAMINOPHEN 15 ML: 7.5; 325 SOLUTION ORAL at 03:10

## 2017-10-01 RX ADMIN — LEVOTHYROXINE SODIUM 112 MCG: 112 TABLET ORAL at 05:10

## 2017-10-01 RX ADMIN — CALCITRIOL 0.25 MCG: 1 SOLUTION ORAL at 09:10

## 2017-10-01 RX ADMIN — BACITRACIN: 500 OINTMENT TOPICAL at 09:10

## 2017-10-01 RX ADMIN — HYDROCODONE BITARTRATE AND ACETAMINOPHEN 15 ML: 7.5; 325 SOLUTION ORAL at 09:10

## 2017-10-01 RX ADMIN — CALCIUM CARBONATE 1000 MG: 1250 SUSPENSION ORAL at 01:10

## 2017-10-01 RX ADMIN — FAMOTIDINE 20 MG: 40 POWDER, FOR SUSPENSION ORAL at 09:10

## 2017-10-01 RX ADMIN — CALCIUM CARBONATE 1000 MG: 1250 SUSPENSION ORAL at 05:10

## 2017-10-01 RX ADMIN — MAGNESIUM OXIDE TAB 400 MG (241.3 MG ELEMENTAL MG) 400 MG: 400 (241.3 MG) TAB at 09:10

## 2017-10-01 RX ADMIN — INSULIN ASPART 1 UNITS: 100 INJECTION, SOLUTION INTRAVENOUS; SUBCUTANEOUS at 04:10

## 2017-10-01 RX ADMIN — SODIUM CHLORIDE 0.6 UNITS/HR: 9 INJECTION, SOLUTION INTRAVENOUS at 05:10

## 2017-10-01 RX ADMIN — HYDROMORPHONE HYDROCHLORIDE 0.5 MG: 1 INJECTION, SOLUTION INTRAMUSCULAR; INTRAVENOUS; SUBCUTANEOUS at 02:10

## 2017-10-01 RX ADMIN — BACITRACIN: 500 OINTMENT TOPICAL at 01:10

## 2017-10-01 RX ADMIN — ENOXAPARIN SODIUM 40 MG: 100 INJECTION SUBCUTANEOUS at 04:10

## 2017-10-01 RX ADMIN — DEXTROSE MONOHYDRATE 12.5 G: 25 INJECTION, SOLUTION INTRAVENOUS at 09:10

## 2017-10-01 RX ADMIN — Medication 16 G: at 09:10

## 2017-10-01 RX ADMIN — CALCIUM CARBONATE 1000 MG: 1250 SUSPENSION ORAL at 09:10

## 2017-10-01 RX ADMIN — HYDROMORPHONE HYDROCHLORIDE 1 MG: 1 INJECTION, SOLUTION INTRAMUSCULAR; INTRAVENOUS; SUBCUTANEOUS at 08:10

## 2017-10-01 RX ADMIN — BACITRACIN: 500 OINTMENT TOPICAL at 05:10

## 2017-10-01 NOTE — PLAN OF CARE
Problem: Patient Care Overview  Goal: Plan of Care Review  Plan of care reviewed with patient. Patient is alert/oriented X4. Independent, up adlib, ambulates in room and in horton, safety maintained, pt. Free from falls. Pt. on rm. air, tolerating well. Continuous Insulin drip infusing at 0.5ml/hr. Vitals stable. Brandy present, CDI, HME in place. Incision to neck, right chest, open to air with staples. Incision cleansed with NS, bacitracin ointment. Incision is Attached at edges,CDI. Peg tube located on left quadrant of abdomen, split guaze dressing present, CDI. Tube feeding restarted per order. Patient on full liquid diet, tolerating well. Will continue to monitor.

## 2017-10-01 NOTE — PLAN OF CARE
Problem: Patient Care Overview  Goal: Plan of Care Review  Outcome: Ongoing (interventions implemented as appropriate)  POC reviewed with patient who acknowledged understanding. VSS on room air, afebrile. AAOx4. Bilateral neck incisions with staples, CDI. Right pectoral incision with staples, CDI. Brandy in place. Ambulating independently in hallways, remains free of falls and injury. Accuchecks q1, insulin gtt titrated per algorithm. NPO, denies nausea. Pain controlled with PRN medications per MAR. Patient denies chest pain & SOB.  No acute events. No distress noted. Bed in lowest position, call light within reach, frequent rounds made for safety. WCTM.

## 2017-10-01 NOTE — ASSESSMENT & PLAN NOTE
Goal calcium low limit normal without symptoms   Calcium low (see hypocalcemia for further management).    Currently on Calcitriol 0.25mcg daily, tums 1g tid  Recommend increasing calcitriol to BID.

## 2017-10-01 NOTE — PLAN OF CARE
Problem: Patient Care Overview  Goal: Plan of Care Review  Outcome: Revised  Plan of care reviewed and understood with patient. Vital signs stable with exception of high blood sugar. Endocrine got involved today and placed patient on insulin drip with Q 1 hr ACCUCHECKS. Patients blood sugar has started to trend down since insulin drip. Patient has NPO diet with tube feedings held per order. Peg tube intact and patent. Respirations even and unlabored. Both HUNTER drains removed from neck, patient tolerated well and with no bleeding observed. Patient has sat in the chair and ambulated the horton multiple times today. Patient suctioned self adequately and respiratory therapy has educated family on how to do so as well today. Pain well managed. Patient remains free of falls with no distress noted.

## 2017-10-01 NOTE — ASSESSMENT & PLAN NOTE
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    - aleksandra tube in stoma c HME  - consulted Hem/Onc Psychology (see note 9/25/17) - no interventions  - no abx needed  - passed esophagram 9/29/17   Fullliquid diet 2 weeks   Restart bolus TF via G tube               -Diabetasource  cc 5x/day c 50 FWF H20 before and after  - Endo: postop PTH <5   rocaltrol .25 BID , calcium carbonate 1000 TID   -synthroid 112 mcg per g tube;    - T1DM hyperglycemia    -insulin per endocrine mgmnt (transition from insulin gtt today)    -discharge/home recs pending  - Pt/OT; OOB, PPI, Lovenox    - Case mgmt consulted for d/c planning   -needs PT/OT/SLP, supplies for laryngectomy tube and tube feeds   -endocrine f/u in [place] per pt request   -anticipate d/c Monday glucose control and endocrine home recs

## 2017-10-01 NOTE — ASSESSMENT & PLAN NOTE
BG goal 140-180.   No episode of hypoglycemia    NPO secondary to intensive insulin therapy.  Will start transition insulin at 0.5 and monitor.    Once TF restarted, patient to restart Novolog 4u 5x daily (prior to TF bolus).  Restart levemir 5 u bid with low dose correction scale with restart of full clear liquid diet.    BG monitoring with TF boluses.     9/26/17 Cpeptide < 0.08,  - now T1DM.   MINO positive 18.7.      Dispo: on-going  Has Lantus and Novolog supply  Recommend following up with endocrine for long term management.  Patient lives in [place].  Does not want to follow up with endocrine in [place].  Provided patient's information to case management to assist in follow up in [place].

## 2017-10-01 NOTE — SUBJECTIVE & OBJECTIVE
"Interval HPI:   Overnight events:  Patient became significantly hyperglycemic, likely secondary to diet.  Concerned for DKA, however labs unremarkable.  Patient was started on intensive insulin therapy.     Talked with patient for extended amount of time.  He understands current plan of care.  Currently NPO, while on intensive.    AM glucose 194, 135.    Patient did well overnight.  No longer upset/disgruntled.  No hypoglycemia.    /72 (BP Location: Right arm, Patient Position: Lying)   Pulse 87   Temp 97.6 °F (36.4 °C) (Oral)   Resp 16   Ht 5' 11" (1.803 m)   Wt 60 kg (132 lb 4.4 oz)   SpO2 100%   BMI 18.45 kg/m²     Labs Reviewed and Include      Recent Labs  Lab 10/01/17  0401   *   CALCIUM 7.0*   ALBUMIN 2.3*   PROT 6.2   *   K 4.0   CO2 31*   CL 93*   BUN 8   CREATININE 0.8   ALKPHOS 62   ALT 19   AST 26   BILITOT 0.3     Lab Results   Component Value Date    WBC 13.16 (H) 09/25/2017    HGB 8.8 (L) 09/25/2017    HCT 26.1 (L) 09/25/2017    MCV 92 09/25/2017     (H) 09/25/2017     No results for input(s): TSH, FREET4 in the last 168 hours.  Lab Results   Component Value Date    HGBA1C 9.6 (H) 09/03/2017       Nutritional status:   Body mass index is 18.45 kg/m².  Lab Results   Component Value Date    ALBUMIN 2.3 (L) 10/01/2017    ALBUMIN 2.4 (L) 09/30/2017    ALBUMIN 2.2 (L) 09/29/2017     No results found for: PREALBUMIN    Estimated Creatinine Clearance: 95.8 mL/min (based on SCr of 0.8 mg/dL).    Accu-Checks  Recent Labs      09/30/17   1953  09/30/17   2054  09/30/17   2201  09/30/17   2256  09/30/17   2354  10/01/17   0101  10/01/17   0206  10/01/17   0305  10/01/17   0532  10/01/17   0626   POCTGLUCOSE  113*  145*  156*  174*  163*  165*  169*  162*  217*  194*       Current Medications and/or Treatments Impacting Glycemic Control  Immunotherapy:  Immunosuppressants     None        Steroids:   Hormones     None        Pressors:    Autonomic Drugs     None    "     Hyperglycemia/Diabetes Medications: Antihyperglycemics     Start     Stop Route Frequency Ordered    09/30/17 1415  insulin regular (Humulin R) 100 Units in sodium chloride 0.9% 100 mL infusion      -- IV Continuous 09/30/17 1315    09/29/17 0845  insulin detemir pen 5 Units      -- SubQ 2 times daily 09/29/17 0844    09/28/17 1655  insulin aspart pen 0-5 Units      -- SubQ Every 4 hours PRN 09/28/17 1559

## 2017-10-01 NOTE — PROGRESS NOTES
"[facility]  Endocrinology  Progress Note    Admit Date: 9/22/2017     Reason for Consult: Management of T2DM     Surgical Procedure and Date: PEG/trach 8/30/17; total laryngectomy, bilateral neck dissection (II-IV, VI), total thyroidectomy 9/22/17     Diabetes diagnosis year: 2009     Home Diabetes Medications: lantus 20 units qhs; Novolog 4 units for TF boluses (Diabetisource 5x/day)  Novolog 6 units AC; novolog 10 units for TF bolus + meal      How often checking glucose at home? 1-3 x day       Diabetes Complications include: Hyperglycemia and Diabetic peripheral neuropathy      Complicating diabetes co morbidities: Active Cancer    HPI: Patient is a 47 y.o. male with a diagnosis of uncontrolled T2DM, s/p laryngectomy for SCC of larynx on 9/22, postsurgical hypothyroidism, S/p trach and peg, on enteral nutrition and diet as noted in above regimen at home. Endo consulted for post-op hyperglycemia.     Interval HPI:   Overnight events:  Patient became significantly hyperglycemic, likely secondary to diet.  Concerned for DKA, however labs unremarkable.  Patient was started on intensive insulin therapy.     Talked with patient for extended amount of time.  He understands current plan of care.  Currently NPO, while on intensive.    AM glucose 194, 135.    Patient did well overnight.  No longer upset/disgruntled.  No hypoglycemia.    /72 (BP Location: Right arm, Patient Position: Lying)   Pulse 87   Temp 97.6 °F (36.4 °C) (Oral)   Resp 16   Ht 5' 11" (1.803 m)   Wt 60 kg (132 lb 4.4 oz)   SpO2 100%   BMI 18.45 kg/m²       Labs Reviewed and Include      Recent Labs  Lab 10/01/17  0401   *   CALCIUM 7.0*   ALBUMIN 2.3*   PROT 6.2   *   K 4.0   CO2 31*   CL 93*   BUN 8   CREATININE 0.8   ALKPHOS 62   ALT 19   AST 26   BILITOT 0.3     Lab Results   Component Value Date    WBC 13.16 (H) 09/25/2017    HGB 8.8 (L) 09/25/2017    HCT 26.1 (L) 09/25/2017    MCV 92 09/25/2017    PLT " 387 (H) 09/25/2017     No results for input(s): TSH, FREET4 in the last 168 hours.  Lab Results   Component Value Date    HGBA1C 9.6 (H) 09/03/2017       Nutritional status:   Body mass index is 18.45 kg/m².  Lab Results   Component Value Date    ALBUMIN 2.3 (L) 10/01/2017    ALBUMIN 2.4 (L) 09/30/2017    ALBUMIN 2.2 (L) 09/29/2017     No results found for: PREALBUMIN    Estimated Creatinine Clearance: 95.8 mL/min (based on SCr of 0.8 mg/dL).    Accu-Checks  Recent Labs      09/30/17   1953  09/30/17   2054  09/30/17   2201  09/30/17   2256  09/30/17   2354  10/01/17   0101  10/01/17   0206  10/01/17   0305  10/01/17   0532  10/01/17   0626   POCTGLUCOSE  113*  145*  156*  174*  163*  165*  169*  162*  217*  194*       Current Medications and/or Treatments Impacting Glycemic Control  Immunotherapy:  Immunosuppressants     None        Steroids:   Hormones     None        Pressors:    Autonomic Drugs     None        Hyperglycemia/Diabetes Medications: Antihyperglycemics     Start     Stop Route Frequency Ordered    09/30/17 1415  insulin regular (Humulin R) 100 Units in sodium chloride 0.9% 100 mL infusion      -- IV Continuous 09/30/17 1315    09/29/17 0845  insulin detemir pen 5 Units      -- SubQ 2 times daily 09/29/17 0844    09/28/17 1655  insulin aspart pen 0-5 Units      -- SubQ Every 4 hours PRN 09/28/17 1556          ASSESSMENT and PLAN    * Laryngeal cancer    Per ENT           Type 1 diabetes mellitus with hyperglycemia    BG goal 140-180.   No episode of hypoglycemia    NPO secondary to intensive insulin therapy.  Will start transition insulin at 0.5 and monitor.    Once TF restarted, patient to restart Novolog 4u 5x daily (prior to TF bolus).  Restart levemir 5 u bid with low dose correction scale with restart of full clear liquid diet.    BG monitoring with TF boluses.     9/26/17 Cpeptide < 0.08,  - now T1DM.   MINO positive 18.7.      Dispo: on-going  Has Lantus and Novolog supply  Recommend  following up with endocrine for long term management.  Patient lives in [place].  Does not want to follow up with endocrine in [place].  Provided patient's information to case management to assist in follow up in [place].        Hypocalcemia    ICa 0.84, cCa 8.4.  Worse from previous.    Likely post surgical hypoparathyroidism.    Recommended increasing calcitriol to 0.25mcg BID.  Continue Ca Carbonate 1000mg TID.    Phos normal today          Post-surgical hypoparathyroidism    Goal calcium low limit normal without symptoms   Calcium low (see hypocalcemia for further management).    Currently on Calcitriol 0.25mcg daily, tums 1g tid  Recommend increasing calcitriol to BID.          Hypothyroidism, postsurgical    On Levothyroxine 112 mcg daily.   Check FT4 in 4 weeks (~10/22)        On enteral nutrition    May increase insulin needs    Currently NPO, given intensive insulin therapy.  Will start transition insulin and restart diet.          Dysphagia, oropharyngeal    NPO.  Patient to restart TF some time today.  Unclear when exactly.  Will be scheduled as 300cc bolus 5x/day.              Janette Bañuelos MD  Endocrinology  [facility], Melva Buckner MD,  have personally taken the history and examined the patient and agree with the resident's note as stated above.  Increase basal to 7 bid -- 1-2 hr overlap with drip  Increase Calcitrol to 0.5 bid if corrected calcium does not improve

## 2017-10-01 NOTE — ASSESSMENT & PLAN NOTE
May increase insulin needs    Currently NPO, given intensive insulin therapy.  Will start transition insulin and restart diet.

## 2017-10-01 NOTE — SUBJECTIVE & OBJECTIVE
Interval History: euglycemic with insulin drip started yesterday by Endocrine; Will start transition insulin and restart diet. No resp issues;     Medications:  Continuous Infusions:   insulin (HUMAN R) infusion (adults)       Scheduled Meds:   bacitracin   Topical (Top) TID    calcitriol  0.25 mcg Oral BID    calcium carbonate  1,000 mg Per G Tube TID    enoxaparin  40 mg Subcutaneous Daily    famotidine  20 mg Per G Tube BID    insulin detemir  5 Units Subcutaneous BID    levothyroxine  112 mcg Per G Tube Before breakfast     PRN Meds:calcium gluconate IVPB, calcium gluconate IVPB, calcium gluconate IVPB, dextrose 50%, dextrose 50%, diphenhydrAMINE, glucagon (human recombinant), glucose, glucose, hydrALAZINE, hydrocodone-apap 7.5-325 MG/15 ML, HYDROmorphone, HYDROmorphone, influenza, insulin aspart, magnesium sulfate IVPB, magnesium sulfate IVPB, ondansetron, potassium chloride **AND** potassium chloride **AND** potassium chloride, promethazine (PHENERGAN) IVPB     Review of patient's allergies indicates:  No Known Allergies  Objective:     Vital Signs (24h Range):  Temp:  [96.3 °F (35.7 °C)-98.3 °F (36.8 °C)] 97.6 °F (36.4 °C)  Pulse:  [64-87] 87  Resp:  [16-20] 16  SpO2:  [98 %-100 %] 100 %  BP: (101-148)/(67-81) 119/72        Lines/Drains/Airways     Drain                 Gastrostomy/Enterostomy 08/31/17 1900 30 days          Airway                 Surgical Airway 09/24/17 1300 6 days          Peripheral Intravenous Line                 Peripheral IV - Single Lumen 09/30/17 0150 Left Antecubital 1 day                Physical Exam    Up in chair, NAD  Breathing comfortably via aleksandra tube  Laryngectomy tube with secretions,  HME in place  Stoma healthy, intact  Right chest incision c/d/i c staples       Significant Labs:    CBC  No results for input(s): WBC, HGB, HCT, MCV, PLT in the last 72 hours.  BMP    Recent Labs  Lab 09/29/17  0402 09/30/17  0443 09/30/17  1327 10/01/17  0401   * 124* 405*  152*   * 133* 128* 131*   K 4.5 4.1 5.1 4.0   CL 89* 91* 89* 93*   CO2 30* 34* 26 31*   BUN 15 10 9 8   CREATININE 0.8 0.8 0.9 0.8   CALCIUM 7.0* 7.1* 7.4* 7.0*   PHOS 2.7 2.8  --  4.0   MG 1.2* 1.3*  --  1.4*     COAGS  No results for input(s): PROTIME, INR, PTT in the last 72 hours.      Significant Diagnostics:  Esophagram: : Normal esophagram in patient who is status post laryngectomy.  No evidence of a leak or fistula (9/29/17)

## 2017-10-01 NOTE — PROGRESS NOTES
[facility]  Otorhinolaryngology-Head & Neck Surgery  Progress Note    Subjective:     Post-Op Info:  Procedure(s) (LRB):  DISSECTION-NECK (Right)  LARYNGOSCOPY (N/A)  FLAP-ROTATION (N/A)  LARYNGECTOMY (N/A)  DISSECTION-NECK (Left)   9 Days Post-Op  Hospital Day: 10     Interval History: euglycemic with insulin drip started yesterday by Endocrine; Will start transition insulin and restart diet. No resp issues;     Medications:  Continuous Infusions:   insulin (HUMAN R) infusion (adults)       Scheduled Meds:   bacitracin   Topical (Top) TID    calcitriol  0.25 mcg Oral BID    calcium carbonate  1,000 mg Per G Tube TID    enoxaparin  40 mg Subcutaneous Daily    famotidine  20 mg Per G Tube BID    insulin detemir  5 Units Subcutaneous BID    levothyroxine  112 mcg Per G Tube Before breakfast     PRN Meds:calcium gluconate IVPB, calcium gluconate IVPB, calcium gluconate IVPB, dextrose 50%, dextrose 50%, diphenhydrAMINE, glucagon (human recombinant), glucose, glucose, hydrALAZINE, hydrocodone-apap 7.5-325 MG/15 ML, HYDROmorphone, HYDROmorphone, influenza, insulin aspart, magnesium sulfate IVPB, magnesium sulfate IVPB, ondansetron, potassium chloride **AND** potassium chloride **AND** potassium chloride, promethazine (PHENERGAN) IVPB     Review of patient's allergies indicates:  No Known Allergies  Objective:     Vital Signs (24h Range):  Temp:  [96.3 °F (35.7 °C)-98.3 °F (36.8 °C)] 97.6 °F (36.4 °C)  Pulse:  [64-87] 87  Resp:  [16-20] 16  SpO2:  [98 %-100 %] 100 %  BP: (101-148)/(67-81) 119/72        Lines/Drains/Airways     Drain                 Gastrostomy/Enterostomy 08/31/17 1900 30 days          Airway                 Surgical Airway 09/24/17 1300 6 days          Peripheral Intravenous Line                 Peripheral IV - Single Lumen 09/30/17 0150 Left Antecubital 1 day                Physical Exam    Up in chair, NAD  Breathing comfortably via aleksandra tube  Laryngectomy tube with  secretions,  HME in place  Stoma healthy, intact  Right chest incision c/d/i c staples       Significant Labs:    CBC  No results for input(s): WBC, HGB, HCT, MCV, PLT in the last 72 hours.  BMP    Recent Labs  Lab 09/29/17  0402 09/30/17  0443 09/30/17  1327 10/01/17  0401   * 124* 405* 152*   * 133* 128* 131*   K 4.5 4.1 5.1 4.0   CL 89* 91* 89* 93*   CO2 30* 34* 26 31*   BUN 15 10 9 8   CREATININE 0.8 0.8 0.9 0.8   CALCIUM 7.0* 7.1* 7.4* 7.0*   PHOS 2.7 2.8  --  4.0   MG 1.2* 1.3*  --  1.4*     COAGS  No results for input(s): PROTIME, INR, PTT in the last 72 hours.      Significant Diagnostics:  Esophagram: : Normal esophagram in patient who is status post laryngectomy.  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    - aleksandra tube in stoma c HME  - consulted Hem/Onc Psychology (see note 9/25/17) - no interventions  - no abx needed  - passed esophagram 9/29/17   Fullliquid diet 2 weeks   Restart bolus TF via G tube               -Diabetasource  cc 5x/day c 50 FWF H20 before and after  - Endo: postop PTH <5   rocaltrol .25 BID , calcium carbonate 1000 TID   -synthroid 112 mcg per g tube;    - T1DM hyperglycemia    -insulin per endocrine mgmnt (transition from insulin gtt today)    -discharge/home recs pending  - Pt/OT; OOB, PPI, Lovenox    - Case mgmt consulted for d/c planning   -needs PT/OT/SLP, supplies for laryngectomy tube and tube feeds   -endocrine f/u in [place] per pt request   -anticipate d/c Monday glucose control and endocrine home recs            Wiliam Meeks MD  Otorhinolaryngology-Head & Neck Surgery  [facility]

## 2017-10-02 ENCOUNTER — TELEPHONE (OUTPATIENT)
Dept: SPEECH THERAPY | Facility: HOSPITAL | Age: 47
End: 2017-10-02

## 2017-10-02 ENCOUNTER — TELEPHONE (OUTPATIENT)
Dept: ENDOCRINOLOGY | Facility: CLINIC | Age: 47
End: 2017-10-02

## 2017-10-02 VITALS
SYSTOLIC BLOOD PRESSURE: 124 MMHG | DIASTOLIC BLOOD PRESSURE: 70 MMHG | HEIGHT: 71 IN | TEMPERATURE: 98 F | RESPIRATION RATE: 18 BRPM | OXYGEN SATURATION: 99 % | WEIGHT: 132.25 LBS | BODY MASS INDEX: 18.52 KG/M2 | HEART RATE: 67 BPM

## 2017-10-02 DIAGNOSIS — Z90.02 S/P LARYNGECTOMY: ICD-10-CM

## 2017-10-02 DIAGNOSIS — Z85.21 HISTORY OF LARYNGEAL CANCER: ICD-10-CM

## 2017-10-02 DIAGNOSIS — E83.51 HYPOCALCEMIA: Primary | ICD-10-CM

## 2017-10-02 DIAGNOSIS — E89.2 POST-SURGICAL HYPOPARATHYROIDISM: ICD-10-CM

## 2017-10-02 DIAGNOSIS — R49.0 ALARYNGEAL VOICE: Primary | ICD-10-CM

## 2017-10-02 PROBLEM — E10.649 TYPE 1 DIABETES MELLITUS WITH HYPOGLYCEMIA WITHOUT COMA: Status: RESOLVED | Noted: 2017-09-30 | Resolved: 2017-10-02

## 2017-10-02 PROBLEM — Z78.9 ON ENTERAL NUTRITION: Status: RESOLVED | Noted: 2017-09-05 | Resolved: 2017-10-02

## 2017-10-02 LAB
ALBUMIN SERPL BCP-MCNC: 2.5 G/DL
ALP SERPL-CCNC: 63 U/L
ALT SERPL W/O P-5'-P-CCNC: 19 U/L
ANION GAP SERPL CALC-SCNC: 12 MMOL/L
AST SERPL-CCNC: 24 U/L
BILIRUB SERPL-MCNC: 0.3 MG/DL
BUN SERPL-MCNC: 10 MG/DL
CA-I BLDV-SCNC: 0.88 MMOL/L
CALCIUM SERPL-MCNC: 7.2 MG/DL
CHLORIDE SERPL-SCNC: 92 MMOL/L
CO2 SERPL-SCNC: 27 MMOL/L
CREAT SERPL-MCNC: 0.8 MG/DL
EST. GFR  (AFRICAN AMERICAN): >60 ML/MIN/1.73 M^2
EST. GFR  (NON AFRICAN AMERICAN): >60 ML/MIN/1.73 M^2
GLUCOSE SERPL-MCNC: 113 MG/DL
LACTATE SERPL-SCNC: 0.6 MMOL/L
MAGNESIUM SERPL-MCNC: 1.7 MG/DL
PHOSPHATE SERPL-MCNC: 4.2 MG/DL
POCT GLUCOSE: 128 MG/DL (ref 70–110)
POCT GLUCOSE: 199 MG/DL (ref 70–110)
POCT GLUCOSE: 300 MG/DL (ref 70–110)
POCT GLUCOSE: 420 MG/DL (ref 70–110)
POCT GLUCOSE: >500 MG/DL (ref 70–110)
POTASSIUM SERPL-SCNC: 4.3 MMOL/L
PROT SERPL-MCNC: 6.6 G/DL
SODIUM SERPL-SCNC: 131 MMOL/L

## 2017-10-02 PROCEDURE — 84100 ASSAY OF PHOSPHORUS: CPT

## 2017-10-02 PROCEDURE — 82330 ASSAY OF CALCIUM: CPT

## 2017-10-02 PROCEDURE — 63700000 PHARM REV CODE 250 ALT 637 W/O HCPCS

## 2017-10-02 PROCEDURE — 63600175 PHARM REV CODE 636 W HCPCS: Performed by: NURSE PRACTITIONER

## 2017-10-02 PROCEDURE — 25000003 PHARM REV CODE 250: Performed by: STUDENT IN AN ORGANIZED HEALTH CARE EDUCATION/TRAINING PROGRAM

## 2017-10-02 PROCEDURE — 36415 COLL VENOUS BLD VENIPUNCTURE: CPT

## 2017-10-02 PROCEDURE — 94761 N-INVAS EAR/PLS OXIMETRY MLT: CPT

## 2017-10-02 PROCEDURE — 92609 USE OF SPEECH DEVICE SERVICE: CPT

## 2017-10-02 PROCEDURE — 83605 ASSAY OF LACTIC ACID: CPT

## 2017-10-02 PROCEDURE — 25000003 PHARM REV CODE 250

## 2017-10-02 PROCEDURE — 99232 SBSQ HOSP IP/OBS MODERATE 35: CPT | Mod: ,,, | Performed by: NURSE PRACTITIONER

## 2017-10-02 PROCEDURE — 83735 ASSAY OF MAGNESIUM: CPT

## 2017-10-02 PROCEDURE — 25000003 PHARM REV CODE 250: Performed by: OTOLARYNGOLOGY

## 2017-10-02 PROCEDURE — 80053 COMPREHEN METABOLIC PANEL: CPT

## 2017-10-02 PROCEDURE — 63600175 PHARM REV CODE 636 W HCPCS: Performed by: ANESTHESIOLOGY

## 2017-10-02 PROCEDURE — 25000003 PHARM REV CODE 250: Performed by: ANESTHESIOLOGY

## 2017-10-02 PROCEDURE — 97535 SELF CARE MNGMENT TRAINING: CPT

## 2017-10-02 RX ORDER — INSULIN ASPART 100 [IU]/ML
3 INJECTION, SOLUTION INTRAVENOUS; SUBCUTANEOUS
Status: DISCONTINUED | OUTPATIENT
Start: 2017-10-02 | End: 2017-10-02 | Stop reason: HOSPADM

## 2017-10-02 RX ORDER — IBUPROFEN 200 MG
24 TABLET ORAL
Status: DISCONTINUED | OUTPATIENT
Start: 2017-10-02 | End: 2017-10-02 | Stop reason: HOSPADM

## 2017-10-02 RX ORDER — CALCITRIOL 0.5 UG/1
0.5 CAPSULE ORAL 2 TIMES DAILY
Status: DISCONTINUED | OUTPATIENT
Start: 2017-10-02 | End: 2017-10-02 | Stop reason: HOSPADM

## 2017-10-02 RX ORDER — INSULIN ASPART 100 [IU]/ML
0-5 INJECTION, SOLUTION INTRAVENOUS; SUBCUTANEOUS
Qty: 1 BOX | Refills: 2 | Status: SHIPPED | OUTPATIENT
Start: 2017-10-02 | End: 2018-10-02

## 2017-10-02 RX ORDER — IBUPROFEN 200 MG
16 TABLET ORAL
Status: DISCONTINUED | OUTPATIENT
Start: 2017-10-02 | End: 2017-10-02 | Stop reason: HOSPADM

## 2017-10-02 RX ORDER — HYDROCODONE BITARTRATE AND ACETAMINOPHEN 7.5; 325 MG/15ML; MG/15ML
15 SOLUTION ORAL EVERY 4 HOURS PRN
Qty: 473 ML | Refills: 0 | Status: SHIPPED | OUTPATIENT
Start: 2017-10-02 | End: 2017-10-09 | Stop reason: SDUPTHER

## 2017-10-02 RX ORDER — INSULIN ASPART 100 [IU]/ML
0-5 INJECTION, SOLUTION INTRAVENOUS; SUBCUTANEOUS EVERY 4 HOURS PRN
Status: DISCONTINUED | OUTPATIENT
Start: 2017-10-02 | End: 2017-10-02

## 2017-10-02 RX ORDER — LEVOTHYROXINE SODIUM 112 UG/1
112 TABLET ORAL
Qty: 30 TABLET | Refills: 11 | Status: SHIPPED | OUTPATIENT
Start: 2017-10-03 | End: 2018-10-03

## 2017-10-02 RX ORDER — FAMOTIDINE 40 MG/5ML
20 POWDER, FOR SUSPENSION ORAL 2 TIMES DAILY
Qty: 150 ML | Refills: 2 | Status: SHIPPED | OUTPATIENT
Start: 2017-10-02 | End: 2018-10-02

## 2017-10-02 RX ORDER — GLUCAGON 1 MG
1 KIT INJECTION
Status: DISCONTINUED | OUTPATIENT
Start: 2017-10-02 | End: 2017-10-02 | Stop reason: HOSPADM

## 2017-10-02 RX ORDER — INSULIN ASPART 100 [IU]/ML
0-5 INJECTION, SOLUTION INTRAVENOUS; SUBCUTANEOUS
Qty: 1 BOX | Refills: 2 | Status: SHIPPED | OUTPATIENT
Start: 2017-10-02 | End: 2017-10-02

## 2017-10-02 RX ORDER — CALCIUM CARBONATE 1250 MG/5ML
1000 SUSPENSION ORAL 3 TIMES DAILY
Qty: 900 ML | Refills: 2 | Status: SHIPPED | OUTPATIENT
Start: 2017-10-02 | End: 2018-10-02

## 2017-10-02 RX ORDER — INSULIN ASPART 100 [IU]/ML
INJECTION, SOLUTION INTRAVENOUS; SUBCUTANEOUS
Qty: 1 BOX | Refills: 2 | Status: SHIPPED | OUTPATIENT
Start: 2017-10-02 | End: 2017-10-02 | Stop reason: HOSPADM

## 2017-10-02 RX ORDER — INSULIN ASPART 100 [IU]/ML
0-5 INJECTION, SOLUTION INTRAVENOUS; SUBCUTANEOUS
Status: DISCONTINUED | OUTPATIENT
Start: 2017-10-02 | End: 2017-10-02 | Stop reason: HOSPADM

## 2017-10-02 RX ORDER — INSULIN ASPART 100 [IU]/ML
3 INJECTION, SOLUTION INTRAVENOUS; SUBCUTANEOUS
Qty: 1 BOX | Refills: 2 | Status: SHIPPED | OUTPATIENT
Start: 2017-10-02 | End: 2018-10-02

## 2017-10-02 RX ORDER — LANOLIN ALCOHOL/MO/W.PET/CERES
400 CREAM (GRAM) TOPICAL 2 TIMES DAILY
Qty: 60 TABLET | Refills: 2 | Status: SHIPPED | OUTPATIENT
Start: 2017-10-02

## 2017-10-02 RX ORDER — CALCITRIOL 0.5 UG/1
0.5 CAPSULE ORAL 2 TIMES DAILY
Qty: 60 CAPSULE | Refills: 2 | Status: SHIPPED | OUTPATIENT
Start: 2017-10-02 | End: 2017-10-09 | Stop reason: SDUPTHER

## 2017-10-02 RX ADMIN — BACITRACIN: 500 OINTMENT TOPICAL at 02:10

## 2017-10-02 RX ADMIN — INSULIN ASPART 5 UNITS: 100 INJECTION, SOLUTION INTRAVENOUS; SUBCUTANEOUS at 11:10

## 2017-10-02 RX ADMIN — LEVOTHYROXINE SODIUM 112 MCG: 112 TABLET ORAL at 06:10

## 2017-10-02 RX ADMIN — CALCITRIOL 0.25 MCG: 1 SOLUTION ORAL at 08:10

## 2017-10-02 RX ADMIN — INSULIN ASPART 1 UNITS: 100 INJECTION, SOLUTION INTRAVENOUS; SUBCUTANEOUS at 08:10

## 2017-10-02 RX ADMIN — HYDROMORPHONE HYDROCHLORIDE 0.5 MG: 1 INJECTION, SOLUTION INTRAMUSCULAR; INTRAVENOUS; SUBCUTANEOUS at 02:10

## 2017-10-02 RX ADMIN — BACITRACIN: 500 OINTMENT TOPICAL at 06:10

## 2017-10-02 RX ADMIN — HYDROMORPHONE HYDROCHLORIDE 1 MG: 1 INJECTION, SOLUTION INTRAMUSCULAR; INTRAVENOUS; SUBCUTANEOUS at 02:10

## 2017-10-02 RX ADMIN — HYDROCODONE BITARTRATE AND ACETAMINOPHEN 15 ML: 7.5; 325 SOLUTION ORAL at 04:10

## 2017-10-02 RX ADMIN — MAGNESIUM OXIDE TAB 400 MG (241.3 MG ELEMENTAL MG) 400 MG: 400 (241.3 MG) TAB at 08:10

## 2017-10-02 RX ADMIN — HYDROMORPHONE HYDROCHLORIDE 0.5 MG: 1 INJECTION, SOLUTION INTRAMUSCULAR; INTRAVENOUS; SUBCUTANEOUS at 09:10

## 2017-10-02 RX ADMIN — CALCIUM CARBONATE 1000 MG: 1250 SUSPENSION ORAL at 02:10

## 2017-10-02 RX ADMIN — FAMOTIDINE 20 MG: 40 POWDER, FOR SUSPENSION ORAL at 08:10

## 2017-10-02 RX ADMIN — INSULIN DETEMIR 5 UNITS: 100 INJECTION, SOLUTION SUBCUTANEOUS at 08:10

## 2017-10-02 RX ADMIN — CALCITRIOL 0.5 MCG: 0.5 CAPSULE, LIQUID FILLED ORAL at 10:10

## 2017-10-02 RX ADMIN — CALCIUM CARBONATE 1000 MG: 1250 SUSPENSION ORAL at 06:10

## 2017-10-02 NOTE — PLAN OF CARE
Draw the following labs on Friday, 10/6/17:  Renal function panel  Magnesium  Ionized calcium      Please send results to Dr. Buckner with [facility].  Fax #: [phone]        MAYO Darling, FNP-C  Otolaryngology- Division of Head and Neck Surgical Oncology  [phone]

## 2017-10-02 NOTE — DISCHARGE SUMMARY
Otolaryngology - Head and Neck Surgery  Discharge Summary    Admission Date: 9/22/2017  Discharge Date: 10/02/2017    Admission Diagnosis:   1. S/P laryngectomy    2. Laryngeal cancer    3. Uncontrolled type 2 diabetes mellitus with hyperglycemia, with long-term current use of insulin    4. Hypothyroidism, postsurgical    5. On enteral nutrition    6. Post-surgical hypoparathyroidism    7. Type 1 diabetes mellitus with hyperglycemia    8. Hypoglycemia due to type 1 diabetes mellitus    9. Dysphagia, oropharyngeal    10. Hypocalcemia        Discharge Diagnosis:   1. S/P laryngectomy    2. Laryngeal cancer    3. Uncontrolled type 2 diabetes mellitus with hyperglycemia, with long-term current use of insulin    4. Hypothyroidism, postsurgical    5. On enteral nutrition    6. Post-surgical hypoparathyroidism    7. Type 1 diabetes mellitus with hyperglycemia    8. Hypoglycemia due to type 1 diabetes mellitus    9. Dysphagia, oropharyngeal    10. Hypocalcemia        History of Present Illness: Mr. Esteban is a 47-year-old gentleman who was   recently evaluated in the Emergency Room of [facility] for   hoarseness and shortness of breath.  He had undergone CT scan of the neck at an   outside facility, which revealed a large laryngeal mass worrisome for carcinoma.    He was taken to the Operating Room during that admission where an awake   tracheostomy was performed as well as direct laryngoscopy with biopsy.  Biopsies   of the lesion were consistent with squamous cell carcinoma.  His imaging   studies staged him T4 N0 M0.  In light of these findings, it was recommended   that we proceed to the Operating Room for total laryngectomy with the additional   procedures listed below.    Procedures:   1.  Bilateral neck dissection including levels 2 through 4.  2.  Total laryngectomy.  3.  Total thyroidectomy with right paratracheal/upper mediastinal neck dissection.  4.  Right pectoralis major myofascial flap  coverage of pharyngeal closure.      Hospital Course: Luís Esteban underwent the aforementioned procedures with Dr. Rose and Dr. Deleon on 9/22/17. Following surgery, he was awakened and brought to the SICU. Endocrine was consulted to assist with blood glucose control and pt was placed on an insulin drip. On POD #3, pt was stepped down to the GISSU. On POD # 7, he underwent an esophagram, which did not reveal any evidence of a leak. A liquid diet was then started. His hospital stay was prolonged due to hypocalcemia, hyperglycemia, and pt's refusal to participate in self care or education. By POD #9, pt was transitioned to SQ insulin and the insulin drip was discontinued. On POD #10, pt willingly participated in therapy and demonstrated that he could care for his laryngectomy stoma. Family was present on POD# 10 to participate in education. The patient was discharged home on POD # 10 after patient and family completed laryngectomy education.      Consults: PT, OT, SLP, social work, endocrine, nutrition, SICU, psychology    Medications:       Medication List      START taking these medications    calcitRIOL 0.5 MCG Cap  Commonly known as:  ROCALTROL  Take 1 capsule (0.5 mcg total) by mouth 2 (two) times daily. Crush pill.     calcium carbonate 500 mg/5 mL (1,250 mg/5 mL)  Take 10 mLs (1,000 mg total) by mouth 3 (three) times daily.     famotidine 40 mg/5 mL (8 mg/mL) suspension  Commonly known as:  PEPCID  Take 2.5 mLs (20 mg total) by mouth 2 (two) times daily.     hydrocodone-apap 7.5-325 MG/15 ML oral solution  Commonly known as:  HYCET  Take 15 mLs by mouth every 4 (four) hours as needed.     levothyroxine 112 MCG tablet  Commonly known as:  SYNTHROID  Take 1 tablet (112 mcg total) by mouth before breakfast. Take separately from other medications and food.  Start taking on:  10/3/2017     magnesium oxide 400 mg tablet  Commonly known as:  MAG-OX  Take 1 tablet (400 mg total) by mouth 2 (two) times daily.  "Crush pill        CHANGE how you take these medications    insulin aspart 100 unit/mL Inpn pen  Commonly known as:  NovoLOG  Inject 0-5 Units into the skin 4 (four) times daily before meals and nightly. According to correction (sliding) scale.  What changed:  · how much to take  · how to take this  · when to take this  · additional instructions     insulin detemir 100 unit/mL (3 mL) Inpn pen  Commonly known as:  LEVEMIR FLEXTOUCH  Inject 5 Units into the skin once daily.  What changed:  how much to take        CONTINUE taking these medications    pen needle, diabetic 32 gauge x 5/32" Ndle  Commonly known as:  BD ULTRA-FINE OLESYA PEN NEEDLES  To use 6 times daily with insulin injections.        STOP taking these medications    oxycodone 5 mg/5 mL Soln  Commonly known as:  ROXICODONE           Where to Get Your Medications      These medications were sent to [facility] - [place], [place] - [address], [place] [address]    Phone:  [phone]   · calcitRIOL 0.5 MCG Cap  · calcium carbonate 500 mg/5 mL (1,250 mg/5 mL)  · famotidine 40 mg/5 mL (8 mg/mL) suspension  · hydrocodone-apap 7.5-325 MG/15 ML oral solution  · insulin aspart 100 unit/mL Inpn pen  · insulin detemir 100 unit/mL (3 mL) Inpn pen  · levothyroxine 112 MCG tablet  · magnesium oxide 400 mg tablet         Disposition: home    Instructions:   Diet general    Lifting restrictions    Order Comments:  No heavy lifting (nothing greater than 20 lbs), no stooping   Call MD for: temperature >100.4    Call MD for: redness, tenderness, or signs of infection (pain, swelling, redness, odor or green/yellow discharge around incision site)    Change dressing (specify)    Order Comments:       Follow up in 1 week  Call our office at [phone] for any problems or questions    "

## 2017-10-02 NOTE — PLAN OF CARE
Otolaryngology - Head and Neck Surgery  Discharge Summary    Admission Date: 9/22/2017  Discharge Date: 10/02/2017    Admission Diagnosis:   1. S/P laryngectomy    2. Laryngeal cancer    3. Uncontrolled type 2 diabetes mellitus with hyperglycemia, with long-term current use of insulin    4. Hypothyroidism, postsurgical    5. On enteral nutrition    6. Post-surgical hypoparathyroidism    7. Type 1 diabetes mellitus with hyperglycemia    8. Hypoglycemia due to type 1 diabetes mellitus    9. Dysphagia, oropharyngeal    10. Hypocalcemia        Discharge Diagnosis:   1. S/P laryngectomy    2. Laryngeal cancer    3. Uncontrolled type 2 diabetes mellitus with hyperglycemia, with long-term current use of insulin    4. Hypothyroidism, postsurgical    5. On enteral nutrition    6. Post-surgical hypoparathyroidism    7. Type 1 diabetes mellitus with hyperglycemia    8. Hypoglycemia due to type 1 diabetes mellitus    9. Dysphagia, oropharyngeal    10. Hypocalcemia        History of Present Illness: Mr. Esteban is a 47-year-old gentleman who was   recently evaluated in the Emergency Room of [facility] for   hoarseness and shortness of breath.  He had undergone CT scan of the neck at an   outside facility, which revealed a large laryngeal mass worrisome for carcinoma.    He was taken to the Operating Room during that admission where an awake   tracheostomy was performed as well as direct laryngoscopy with biopsy.  Biopsies   of the lesion were consistent with squamous cell carcinoma.  His imaging   studies staged him T4 N0 M0.  In light of these findings, it was recommended   that we proceed to the Operating Room for total laryngectomy with the additional   procedures listed below.    Procedures:   1.  Bilateral neck dissection including levels 2 through 4.  2.  Total laryngectomy.  3.  Total thyroidectomy with right paratracheal/upper mediastinal neck dissection.  4.  Right pectoralis major myofascial flap  coverage of pharyngeal closure.      Hospital Course: Luís Esteban underwent the aforementioned procedures with Dr. Rose and Dr. Deleon on 9/22/17. Following surgery, he was awakened and brought to the SICU. Endocrine was consulted to assist with blood glucose control and pt was placed on an insulin drip. On POD #3, pt was stepped down to the GISSU. On POD # 7, he underwent an esophagram, which did not reveal any evidence of a leak. A liquid diet was then started. His hospital stay was prolonged due to hypocalcemia, hyperglycemia, and pt's refusal to participate in self care or education. By POD #9, pt was transitioned to SQ insulin and the insulin drip was discontinued. On POD #10, pt willingly participated in therapy and demonstrated that he could care for his laryngectomy stoma. Family was present on POD# 10 to participate in education. The patient was discharged home on POD # 10 after patient and family completed laryngectomy education.      Consults: PT, OT, SLP, social work, endocrine, nutrition, SICU, psychology    Medications:       Medication List      START taking these medications    calcitRIOL 0.5 MCG Cap  Commonly known as:  ROCALTROL  Take 1 capsule (0.5 mcg total) by mouth 2 (two) times daily. Crush pill.     calcium carbonate 500 mg/5 mL (1,250 mg/5 mL)  Take 10 mLs (1,000 mg total) by mouth 3 (three) times daily.     famotidine 40 mg/5 mL (8 mg/mL) suspension  Commonly known as:  PEPCID  Take 2.5 mLs (20 mg total) by mouth 2 (two) times daily.     hydrocodone-apap 7.5-325 MG/15 ML oral solution  Commonly known as:  HYCET  Take 15 mLs by mouth every 4 (four) hours as needed.     levothyroxine 112 MCG tablet  Commonly known as:  SYNTHROID  Take 1 tablet (112 mcg total) by mouth before breakfast. Take separately from other medications and food.  Start taking on:  10/3/2017     magnesium oxide 400 mg tablet  Commonly known as:  MAG-OX  Take 1 tablet (400 mg total) by mouth 2 (two) times daily.  "Crush pill        CHANGE how you take these medications    insulin aspart 100 unit/mL Inpn pen  Commonly known as:  NovoLOG  Inject 0-5 Units into the skin 4 (four) times daily before meals and nightly. According to correction (sliding) scale.  What changed:  · how much to take  · how to take this  · when to take this  · additional instructions     insulin detemir 100 unit/mL (3 mL) Inpn pen  Commonly known as:  LEVEMIR FLEXTOUCH  Inject 5 Units into the skin once daily.  What changed:  how much to take        CONTINUE taking these medications    pen needle, diabetic 32 gauge x 5/32" Ndle  Commonly known as:  BD ULTRA-FINE OLESYA PEN NEEDLES  To use 6 times daily with insulin injections.        STOP taking these medications    oxycodone 5 mg/5 mL Soln  Commonly known as:  ROXICODONE           Where to Get Your Medications      These medications were sent to [facility] - [place], [place] - [address], [place] [address]    Phone:  [phone]   · calcitRIOL 0.5 MCG Cap  · calcium carbonate 500 mg/5 mL (1,250 mg/5 mL)  · famotidine 40 mg/5 mL (8 mg/mL) suspension  · hydrocodone-apap 7.5-325 MG/15 ML oral solution  · insulin aspart 100 unit/mL Inpn pen  · insulin detemir 100 unit/mL (3 mL) Inpn pen  · levothyroxine 112 MCG tablet  · magnesium oxide 400 mg tablet         Disposition: home    Instructions:   Diet general    Lifting restrictions    Order Comments:  No heavy lifting (nothing greater than 20 lbs), no stooping   Call MD for: temperature >100.4    Call MD for: redness, tenderness, or signs of infection (pain, swelling, redness, odor or green/yellow discharge around incision site)    Change dressing (specify)    Order Comments:       Follow up in 1 week  Call our office at [phone] for any problems or questions    "

## 2017-10-02 NOTE — PLAN OF CARE
Problem: SLP Goal  Goal: SLP Goal  Speech Language Pathology Goals  Updated Goals expected to be met by 10/9    1. Pt/family will actively participate in post-laryngectomy education to facilitate [place] and desensitization  2. Pt will communicate contextualized phrases via AL with approximately 80% intelligibility provided min cues to improve communication.  3. Pt/family will perform stoma care x1 per session provided min cues to facilitate [place].               Pt with ongoing progress towards goals    Anne Marie Solis MS, CCC-SLP  Speech Language Pathologist  Pager: [phone]  Date  10/2/2017

## 2017-10-02 NOTE — PLAN OF CARE
Problem: Patient Care Overview  Goal: Plan of Care Review  Outcome: Ongoing (interventions implemented as appropriate)  POC reviewed with patient who acknowledged understanding. VSS on room air, afebrile. AAOx4. Bilateral neck incisions with staples, CDI. Right pectoral incision with staples, CDI. Brandy in place. Ambulating independently in hallways, remains free of falls and injury. Accuchecks q4, insulin gtt discontinued, no PRN insulin given. Tolerating full liquid diet, denies nausea. Patient continues to request food items that are NOT included in this diet. Patient was re-educated on diet but concerned that he will not follow diet upon discharge. Bolus tube feeds given thru PEG x1 but refused other feedings. Pain controlled with PRN medications per MAR. Patient denies chest pain & SOB.  No acute events. No distress noted. Bed in lowest position, call light within reach, frequent rounds made for safety. WCTM.

## 2017-10-02 NOTE — PT/OT/SLP PROGRESS
Speech Language Pathology  Treatment    Luís Esteban   MRN: [id number]   Admitting Diagnosis: Laryngeal cancer    Diet recommendations:  · Clear liquids advanced per medical team   · Encourage ongoing AL use   · Encourage ongoing stoma care    SLP Treatment Date: 10/02/17  Speech Start Time: 1230     Speech Stop Time: 1247     Speech Total (min): 17 min       TREATMENT BILLABLE MINUTES:  Therapeutic Speech Device 9 and [place] Care/Home Management Training 20 (education completed twice this date with Pt 8 minutes and with family members across 12 minutes)    Has the patient been evaluated by SLP for swallowing? : No (Pt diet advanced by medical team )  Keep patient NPO?: No (Pt diet advanced to clears by medical team )   General Precautions: Standard, aspiration, fall  Current Respiratory Status: laryngectomy (neck breather)       Subjective:  Pt awake alert and comfortable     Pain/Comfort  Pain Rating 1: 0/10  Pain Rating Post-Intervention 1: 0/10    Objective:     Pt willingly participated in treatment session. Pt reporting adequately cleaning stoma and aleksandra tube over the weekend independently. Pt reporting recently just cleaning aleksandra tube, SLP inspected and aleksandra tube appearing clean without overt secretions. Pt demonstrated appropriate steps for stoma care independently via gestures and pointing at appropriate objects needed for cleaning. Pt using AL functionally for spontaneous short phrases with 75% intelligibility. Pt using non dominant hand independently yet warrants moderate verbal cueing to over articulate AL speech to enhance intelligibility. Pt continues to meet communication needs via AL use, mouthing of words, gestures and facial expressions. Pt with ongoing progress within speech therapy sessions.     Pt seen again [id number] for education of family members re: stoma care and supplies pending discharge later this date. Family members included Pt brother and aunt who Pt will be residing with. Family  members were educated regarding shower guard, HMEs, daily cleaning of aleksandra tube, how to clean aleksandra tube, and AL use. Discussed with family members to encourage AL use and wearing of communication impairment bracelet.  Family members engaged in education asking appropriate questions. Family members demonstrated understanding and adequate verbal teach back of all necessary supplies. Pt and family members aware of need for ongoing follow up as an out patient and have information/numers for supplies and future appointments.     Assessment:  Luís Esteban is a 47 y.o. male with a medical diagnosis of Laryngeal cancer and presents s/p total laryngectomy seen for ongoing stoma care, AL use and ongoing post op education.       Discharge recommendations: Discharge Facility/Level Of Care Needs: home with home health, outpatient speech therapy     Goals:    SLP Goals        Problem: SLP Goal    Goal Priority Disciplines Outcome   SLP Goal     SLP    Description:  Speech Language Pathology Goals  Updated Goals expected to be met by 10/9    1. Pt/family will actively participate in post-laryngectomy education to facilitate [place] and desensitization  2. Pt will communicate contextualized phrases via AL with approximately 80% intelligibility provided min cues to improve communication.  3. Pt/family will perform stoma care x1 per session provided min cues to facilitate [place].                                Plan:   Patient to be seen Therapy Frequency: 5 x/week   Plan of Care expires: 10/23/17  Plan of Care reviewed with: patient  SLP Follow-up?: Yes       Anne Marie Solis CCC-SLP  10/02/2017

## 2017-10-02 NOTE — SUBJECTIVE & OBJECTIVE
Interval History: No acute events overnight, inquiring about possible date of discharge    Medications:  Continuous Infusions:   insulin (HUMAN R) infusion (adults) Stopped (10/01/17 2011)     Scheduled Meds:   bacitracin   Topical (Top) TID    calcitriol  0.25 mcg Oral BID    calcium carbonate  1,000 mg Per G Tube TID    enoxaparin  40 mg Subcutaneous Daily    famotidine  20 mg Per G Tube BID    insulin detemir  5 Units Subcutaneous BID    levothyroxine  112 mcg Per G Tube Before breakfast    magnesium oxide  400 mg Per G Tube BID     PRN Meds:calcium gluconate IVPB, calcium gluconate IVPB, calcium gluconate IVPB, dextrose 50%, dextrose 50%, diphenhydrAMINE, glucagon (human recombinant), glucose, glucose, hydrALAZINE, hydrocodone-apap 7.5-325 MG/15 ML, HYDROmorphone, HYDROmorphone, influenza, insulin aspart, magnesium sulfate IVPB, magnesium sulfate IVPB, ondansetron, potassium chloride **AND** potassium chloride **AND** potassium chloride, promethazine (PHENERGAN) IVPB     Review of patient's allergies indicates:  No Known Allergies  Objective:     Vital Signs (24h Range):  Temp:  [97.4 °F (36.3 °C)-98.2 °F (36.8 °C)] 98 °F (36.7 °C)  Pulse:  [55-87] 63  Resp:  [16-20] 18  SpO2:  [93 %-100 %] 97 %  BP: (111-144)/(68-85) 144/85        Lines/Drains/Airways     Drain                 Gastrostomy/Enterostomy 08/31/17 1900 31 days          Airway                 Surgical Airway 09/24/17 1300 7 days          Peripheral Intravenous Line                 Peripheral IV - Single Lumen 10/01/17 1027 Left Forearm less than 1 day                Physical Exam    Up in chair, NAD  Breathing comfortably via aleksandra tube  Laryngectomy tube with secretions,  HME in place  Stoma healthy, intact  Right chest incision well approximated      Significant Labs:    CBC  No results for input(s): WBC, HGB, HCT, MCV, PLT in the last 72 hours.  BMP    Recent Labs  Lab 09/30/17  0443 09/30/17  1327 10/01/17  0401 10/02/17  0425   *  405* 152* 113*   * 128* 131* 131*   K 4.1 5.1 4.0 4.3   CL 91* 89* 93* 92*   CO2 34* 26 31* 27   BUN 10 9 8 10   CREATININE 0.8 0.9 0.8 0.8   CALCIUM 7.1* 7.4* 7.0* 7.2*   PHOS 2.8  --  4.0 4.2   MG 1.3*  --  1.4* 1.7     COAGS  No results for input(s): PROTIME, INR, PTT in the last 72 hours.      Significant Diagnostics:  Esophagram: : Normal esophagram in patient who is status post laryngectomy.  No evidence of a leak or fistula (9/29/17)

## 2017-10-02 NOTE — ASSESSMENT & PLAN NOTE
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    - aleksandra tube in stoma c HME  - consulted Hem/Onc Psychology (see note 9/25/17) - no interventions  - no abx needed  - passed esophagram 9/29/17   Fullliquid diet 2 weeks   Restart bolus TF via G tube               -Diabetasource  cc 5x/day c 50 FWF H20 before and after  - Endo: postop PTH <5   rocaltrol .25 BID , calcium carbonate 1000 TID- await endocrine recs given low Ca,Mg this AM   -synthroid 112 mcg per g tube;    - T1DM hyperglycemia    -insulin per endocrine mgmnt    -discharge/home recs pending  - Pt/OT; OOB, PPI, Lovenox    - Case mgmt consulted for d/c planning   -needs PT/OT/SLP, supplies for laryngectomy tube and tube feeds   -end[place] f/[place] in [place] per pt request   -anticipate d/c today pending endocrine recs

## 2017-10-02 NOTE — PROGRESS NOTES
"[facility]  Endocrinology  Progress Note    Admit Date: 9/22/2017     Reason for Consult: Management of T1DM     Surgical Procedure and Date: PEG/trach 8/30/17; total laryngectomy, bilateral neck dissection (II-IV, VI), total thyroidectomy 9/22/17     Diabetes diagnosis year: 2009     Home Diabetes Medications: Lantus 20 units qhs; Novolog 4 units for TF boluses (Diabetasource 5x/day)  Novolog 6 units AC; Novolog 10 units for TF bolus + meal      How often checking glucose at home? 1-3 x day       Diabetes Complications include: Hyperglycemia and Diabetic peripheral neuropathy      Complicating diabetes co morbidities: Active Cancer    HPI: Patient is a 47 y.o. male with a diagnosis of uncontrolled T1DM, s/p laryngectomy for SCC of larynx on 9/22, postsurgical hypothyroidism, S/p trach and peg, on enteral nutrition and diet as noted in above regimen at home. Endo consulted for post-op hyperglycemia.     Interval HPI:   No acute events overnight. Possible discharge today. Bolus TF with Diabetasource have been discontinued. He is on a full liquid diet, eating most of his meals. Latest BG near goal. Afebrile. Readings of 67 and 58 occurred last night.     /76   Pulse 66   Temp 96.6 °F (35.9 °C)   Resp 16   Ht 5' 11" (1.803 m)   Wt 60 kg (132 lb 4.4 oz)   SpO2 100%   BMI 18.45 kg/m²      Labs Reviewed and Include      Recent Labs  Lab 10/02/17  0425   *   CALCIUM 7.2*   ALBUMIN 2.5*   PROT 6.6   *   K 4.3   CO2 27   CL 92*   BUN 10   CREATININE 0.8   ALKPHOS 63   ALT 19   AST 24   BILITOT 0.3     Lab Results   Component Value Date    WBC 13.16 (H) 09/25/2017    HGB 8.8 (L) 09/25/2017    HCT 26.1 (L) 09/25/2017    MCV 92 09/25/2017     (H) 09/25/2017     No results for input(s): TSH, FREET4 in the last 168 hours.  Lab Results   Component Value Date    HGBA1C 9.6 (H) 09/03/2017       Nutritional status:   Body mass index is 18.45 kg/m².  Lab Results   Component Value " Date    ALBUMIN 2.5 (L) 10/02/2017    ALBUMIN 2.3 (L) 10/01/2017    ALBUMIN 2.4 (L) 09/30/2017     No results found for: PREALBUMIN    Estimated Creatinine Clearance: 95.8 mL/min (based on SCr of 0.8 mg/dL).    Accu-Checks  Recent Labs      10/01/17   1140  10/01/17   1616  10/01/17   2006  10/01/17   2055  10/01/17   2150  10/01/17   2205  10/01/17   2224  10/01/17   2338  10/02/17   0418  10/02/17   0719   POCTGLUCOSE  115*  175*  81  67*  58*  154*  144*  145*  128*  199*       Current Medications and/or Treatments Impacting Glycemic Control  Immunotherapy:  Immunosuppressants     None        Steroids:   Hormones     None        Pressors:    Autonomic Drugs     None        Hyperglycemia/Diabetes Medications: Antihyperglycemics     Start     Stop Route Frequency Ordered    10/01/17 0900  insulin regular (Humulin R) 100 Units in sodium chloride 0.9% 100 mL infusion      -- IV Continuous 10/01/17 0747    10/01/17 0846  insulin aspart pen 0-5 Units      -- SubQ As needed (PRN) 10/01/17 0747    09/29/17 0845  insulin detemir pen 5 Units      -- SubQ 2 times daily 09/29/17 0844          ASSESSMENT and PLAN    * Laryngeal cancer    Per ENT           Type 1 diabetes mellitus with hyperglycemia    BG goal 140-180. Levemir 5 units daily, low dose correction scale, monitor AC/HS. No prandial excursions at this time.      9/26/17   C-peptide < 0.08,  - now T1DM.   MINO positive 18.7.    Addendum at 1457: BG 400s, now down to 300. Add in prandial insulin starting with 3 units with each meal. Split Levemir to 3 units bid.     Dispo:   Has Lantus and Novolog supply at home. Home on Lantus 3 units bid, Novolog 3 units with meals.   Recommend following up with endocrine for long term management.  Patient lives in [place].  Does not want to follow up with endocrine in [place].  Previously provided patient's information to case management to assist in follow up in [place].        Post-surgical hypoparathyroidism    Goal calcium low limit  normal without symptoms   Calcium low (see hypocalcemia for further management).    Currently on Calcitriol 0.25mcg daily, tums 1g tid  Staff MD recommended increasing calcitriol to BID on 10/1/17 and she is currently on bid dosing          Hypocalcemia    ICa 0.84, cCa 8.4.  Worse from previous.    Likely post surgical hypoparathyroidism.    On Calcitriol 0.25mcg BID.  Continue Ca Carbonate 1000mg TID.    Phos normal today          Hypothyroidism, postsurgical    On Levothyroxine 112 mcg daily.   Check FT4 in 4 weeks (~10/22)            Zeke Degroot, GAMALIEL, NP  Endocrinology  [facility]

## 2017-10-02 NOTE — ASSESSMENT & PLAN NOTE
ICa 0.84, cCa 8.4.  Worse from previous.    Likely post surgical hypoparathyroidism.    On Calcitriol 0.25mcg BID.  Continue Ca Carbonate 1000mg TID.    Phos normal today

## 2017-10-02 NOTE — ASSESSMENT & PLAN NOTE
BG goal 140-180. Levemir 5 units daily, low dose correction scale, monitor AC/HS. No prandial excursions at this time.      9/26/17   C-peptide < 0.08,  - now T1DM.   MINO positive 18.7.    Dispo:   Has Lantus and Novolog supply at home  Recommend following up with endocrine for long term management.  Patient lives in [place].  Does not want to follow up with endocrine in [place].  Previously provided patient's information to case management to assist in follow up in [place].

## 2017-10-02 NOTE — PLAN OF CARE
SMILEY learned that Pt no longer requires home tube feeds, as he has adequate PO intake. SMILEY notified Myron with [facility] of this. SMILEY spoke to Pt's sister, Lisy ([phone]), who stated when she had spoken to Pt's nurse the previous day that she was told Pt wasn't discharged ready because hew as still on an insulin drip. SW informed Pt's sister that Pt is off the insulin drip and that Endocrinology had made recommendations for home. Pt's sister said that she would call her aunt and brother to see if they could come to the hospital today. SMILEY also asked Lisy to please remember to bring Pt's home/portable suction machine to the hospital for discharge.     SMILEY to continue to follow.     DEYANIRA RibeiroW

## 2017-10-02 NOTE — ASSESSMENT & PLAN NOTE
Goal calcium low limit normal without symptoms   Calcium low (see hypocalcemia for further management).    Currently on Calcitriol 0.25mcg daily, tums 1g tid  Staff MD recommended increasing calcitriol to BID on 10/1/17 and she is currently on bid dosing

## 2017-10-02 NOTE — ASSESSMENT & PLAN NOTE
BG goal 140-180. Levemir 5 units daily, low dose correction scale, monitor AC/HS. No prandial excursions at this time.      9/26/17   C-peptide < 0.08,  - now T1DM.   MINO positive 18.7.  Addendum: Levemir 3 units bid, Novolog 3 units with meals. BGs 400s, down to 3 units. Noted with historically labile readings, episodes of hypoglycemia.     Dispo:   Has Lantus and Novolog supply at home. Home on Lantus 3 units bid, Novolog 3 units with meals.   Recommend following up with endocrine for long term management.  Patient lives in [place].  Does not want to follow up with endocrine in [place].  Previously provided patient's information to case management to assist in follow up in [place].

## 2017-10-02 NOTE — PLAN OF CARE
Patient discharged home today with [employer] supplies.  Left message for [facility] to schedule hospital follow up appt and lab draw on Friday 10/6/2017.  Waiting for call back and will notify patient of date and time of both when scheduled.     UPDATE:  Scheduled hospital follow up with KATRINA Mandujano 10/11/2017 @ 7:20am and requested labs 10/6/2017 @ 9:00am at [facility], to be faxed to Dr Estevez in Endocrinology.    Future Appointments  Date Time Provider Department Center   10/9/2017 10:30 AM Ryann Oneil NP [facility]   10/9/2017 11:00 AM Evonne Still MA, CCC-SLP [facility]            10/02/17 4216   Final Note   Assessment Type Final Discharge Note   Discharge Disposition Home   Right Care Referral Info   Post Acute Recommendation No Care

## 2017-10-02 NOTE — PROGRESS NOTES
[facility]  Otorhinolaryngology-Head & Neck Surgery  Progress Note    Subjective:     Post-Op Info:  Procedure(s) (LRB):  DISSECTION-NECK (Right)  LARYNGOSCOPY (N/A)  FLAP-ROTATION (N/A)  LARYNGECTOMY (N/A)  DISSECTION-NECK (Left)   10 Days Post-Op  Hospital Day: 11     Interval History: No acute events overnight, inquiring about possible date of discharge    Medications:  Continuous Infusions:   insulin (HUMAN R) infusion (adults) Stopped (10/01/17 2011)     Scheduled Meds:   bacitracin   Topical (Top) TID    calcitriol  0.25 mcg Oral BID    calcium carbonate  1,000 mg Per G Tube TID    enoxaparin  40 mg Subcutaneous Daily    famotidine  20 mg Per G Tube BID    insulin detemir  5 Units Subcutaneous BID    levothyroxine  112 mcg Per G Tube Before breakfast    magnesium oxide  400 mg Per G Tube BID     PRN Meds:calcium gluconate IVPB, calcium gluconate IVPB, calcium gluconate IVPB, dextrose 50%, dextrose 50%, diphenhydrAMINE, glucagon (human recombinant), glucose, glucose, hydrALAZINE, hydrocodone-apap 7.5-325 MG/15 ML, HYDROmorphone, HYDROmorphone, influenza, insulin aspart, magnesium sulfate IVPB, magnesium sulfate IVPB, ondansetron, potassium chloride **AND** potassium chloride **AND** potassium chloride, promethazine (PHENERGAN) IVPB     Review of patient's allergies indicates:  No Known Allergies  Objective:     Vital Signs (24h Range):  Temp:  [97.4 °F (36.3 °C)-98.2 °F (36.8 °C)] 98 °F (36.7 °C)  Pulse:  [55-87] 63  Resp:  [16-20] 18  SpO2:  [93 %-100 %] 97 %  BP: (111-144)/(68-85) 144/85        Lines/Drains/Airways     Drain                 Gastrostomy/Enterostomy 08/31/17 1900 31 days          Airway                 Surgical Airway 09/24/17 1300 7 days          Peripheral Intravenous Line                 Peripheral IV - Single Lumen 10/01/17 1027 Left Forearm less than 1 day                Physical Exam    Up in chair, NAD  Breathing comfortably via aleksandra tube  Laryngectomy tube  with secretions,  HME in place  Stoma healthy, intact  Right chest incision well approximated      Significant Labs:    CBC  No results for input(s): WBC, HGB, HCT, MCV, PLT in the last 72 hours.  BMP    Recent Labs  Lab 09/30/17  0443 09/30/17  1327 10/01/17  0401 10/02/17  0425   * 405* 152* 113*   * 128* 131* 131*   K 4.1 5.1 4.0 4.3   CL 91* 89* 93* 92*   CO2 34* 26 31* 27   BUN 10 9 8 10   CREATININE 0.8 0.9 0.8 0.8   CALCIUM 7.1* 7.4* 7.0* 7.2*   PHOS 2.8  --  4.0 4.2   MG 1.3*  --  1.4* 1.7     COAGS  No results for input(s): PROTIME, INR, PTT in the last 72 hours.      Significant Diagnostics:  Esophagram: : Normal esophagram in patient who is status post laryngectomy.  No evidence of a leak or fistula (9/29/17)    Assessment/Plan:     * Laryngeal cancer    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    - aleksandra tube in stoma c HME  - consulted Hem/Onc Psychology (see note 9/25/17) - no interventions  - no abx needed  - passed esophagram 9/29/17   Fullliquid diet 2 weeks   Restart bolus TF via G tube               -Diabetasource  cc 5x/day c 50 FWF H20 before and after  - Endo: postop PTH <5   rocaltrol .25 BID , calcium carbonate 1000 TID- await endocrine recs given low Ca,Mg this AM   -synthroid 112 mcg per g tube;    - T1DM hyperglycemia    -insulin per endocrine mgmnt    -discharge/home recs pending  - Pt/OT; OOB, PPI, Lovenox    - Case mgmt consulted for d/c planning   -needs PT/OT/SLP, supplies for laryngectomy tube and tube feeds   -[place] in [place] per [place] request   -anticipate d/c today pending endocrine recs            Jacob P Brunner, MD  Otorhinolaryngology-Head & Neck Surgery  [facility]

## 2017-10-02 NOTE — PLAN OF CARE
SMILEY learned Pt's family had arrived. SMILEY called Speech therapist, Anne Marie, and asked that she stop by Pt's room to do reinforcement teaching with Pt's family (aunt and brother). Anne Marie agreed to do so. SMILEY went to meet with them and provided them the standing order form for [employer] and explained they would just need to call when Pt was running low on something. Pt's family expressed understanding. Pt had his aleksandra kit all packed up and showed SW all of his supplies, etc.   DUTCH, Marjorie Perry, is working to arrange PCP follow-up and to have the labs requested by Endocrinology done on Friday.     Shirley Sierra, KJ

## 2017-10-02 NOTE — PLAN OF CARE
Problem: Patient Care Overview  Goal: Plan of Care Review  Outcome: Outcome(s) achieved Date Met: 10/02/17  Discharge instructions, follow up appointments, and education on medications, including updated insulin dosages given and explained to patient and family. Patient and family verbalized understanding. PIV removed with catheter tip intact. Wheelchair offered to patient, patient refused, left unit by foot with family.

## 2017-10-02 NOTE — DISCHARGE INSTRUCTIONS
Activity: no strenuous activity or heavy lifting x 2 weeks. OK to shower. Keep incisions clean and dry.    Laryngectomy stoma care: Clean stoma twice daily and as needed. Suction as needed. Change HME when dirty. Do not rinse HME.     Diet: Full liquid diet x 2 weeks. Full liquids includes liquids such as Boost, Ensure, smoothies, shakes, and soups. Nothing hard or crunchy.  After 2 weeks, you may eat soft foods (ex: scrambled eggs, oatmeal, soft fish, mashed potatoes) .  Please crush all tablets to take by mouth x 2 weeks.  Check blood glucose before meals and nightly. Give insulin aspart according to sliding scale if needed.  Give regular insulin as scheduled.    Novolog (insulin aspart) sliding scale.   Blood Glucose   mg/dL                  0600                                1000                                1400                2200                                 1800                0200   151-200                0 units             0 units   201-250                2 units             1 units   251-300                3 units             1 units   301-350                4 units             2 units   >350                      5 units             3 units   Administer subcutaneously if needed with meals. Give in addition to scheduled insulin.     Follow up with PCP Friday for lab work.  Follow up in ENT clinic next week.

## 2017-10-02 NOTE — SUBJECTIVE & OBJECTIVE
"Interval HPI:   No acute events overnight. Possible discharge today. Bolus TF with Diabetasource have been discontinued. He is on a full liquid diet, eating most of his meals. Latest BG near goal. Afebrile. Readings of 67 and 58 occurred last night.     /76   Pulse 66   Temp 96.6 °F (35.9 °C)   Resp 16   Ht 5' 11" (1.803 m)   Wt 60 kg (132 lb 4.4 oz)   SpO2 100%   BMI 18.45 kg/m²     Labs Reviewed and Include      Recent Labs  Lab 10/02/17  0425   *   CALCIUM 7.2*   ALBUMIN 2.5*   PROT 6.6   *   K 4.3   CO2 27   CL 92*   BUN 10   CREATININE 0.8   ALKPHOS 63   ALT 19   AST 24   BILITOT 0.3     Lab Results   Component Value Date    WBC 13.16 (H) 09/25/2017    HGB 8.8 (L) 09/25/2017    HCT 26.1 (L) 09/25/2017    MCV 92 09/25/2017     (H) 09/25/2017     No results for input(s): TSH, FREET4 in the last 168 hours.  Lab Results   Component Value Date    HGBA1C 9.6 (H) 09/03/2017       Nutritional status:   Body mass index is 18.45 kg/m².  Lab Results   Component Value Date    ALBUMIN 2.5 (L) 10/02/2017    ALBUMIN 2.3 (L) 10/01/2017    ALBUMIN 2.4 (L) 09/30/2017     No results found for: PREALBUMIN    Estimated Creatinine Clearance: 95.8 mL/min (based on SCr of 0.8 mg/dL).    Accu-Checks  Recent Labs      10/01/17   1140  10/01/17   1616  10/01/17   2006  10/01/17   2055  10/01/17   2150  10/01/17   2205  10/01/17   2224  10/01/17   2338  10/02/17   0418  10/02/17   0719   POCTGLUCOSE  115*  175*  81  67*  58*  154*  144*  145*  128*  199*       Current Medications and/or Treatments Impacting Glycemic Control  Immunotherapy:  Immunosuppressants     None        Steroids:   Hormones     None        Pressors:    Autonomic Drugs     None        Hyperglycemia/Diabetes Medications: Antihyperglycemics     Start     Stop Route Frequency Ordered    10/01/17 0900  insulin regular (Humulin R) 100 Units in sodium chloride 0.9% 100 mL infusion      -- IV Continuous 10/01/17 0747    10/01/17 0846  insulin " aspart pen 0-5 Units      -- SubQ As needed (PRN) 10/01/17 0747    09/29/17 0845  insulin detemir pen 5 Units      -- SubQ 2 times daily 09/29/17 0886

## 2017-10-03 ENCOUNTER — TELEPHONE (OUTPATIENT)
Dept: ENDOCRINOLOGY | Facility: CLINIC | Age: 47
End: 2017-10-03

## 2017-10-03 DIAGNOSIS — E83.51 HYPOCALCEMIA: ICD-10-CM

## 2017-10-03 DIAGNOSIS — E89.2 POST-SURGICAL HYPOPARATHYROIDISM: Primary | ICD-10-CM

## 2017-10-03 PROBLEM — E10.649 TYPE 1 DIABETES MELLITUS WITH HYPOGLYCEMIA WITHOUT COMA: Status: RESOLVED | Noted: 2017-10-03 | Resolved: 2017-10-02

## 2017-10-03 NOTE — TELEPHONE ENCOUNTER
----- Message from Yumiko Ibarra sent at 10/3/2017  8:20 AM CDT -----  Contact: Self   [phone]  Jose   -   Patient  Calling to get labs orders sent to an facility closer to home ,  [facility]  In [place] .   Pt  Call back number  [phone]  Thanks,

## 2017-10-03 NOTE — PHYSICIAN QUERY
PT Name: Luís Esteban  MR #: [id number]     Physician Query Form - Documentation Clarification      CDS/: Monie Kramer RN                 Contact information:[email or web address]    This form is a permanent document in the medical record.     Query Date: October 3, 2017    By submitting this query, we are merely seeking further clarification of documentation. Please utilize your independent clinical judgment when addressing the question(s) below.    The Medical record reflects the following:    Supporting Clinical Findings Location in Medical Record   Type 1 diabetes mellitus with hyperglycemia     Uncontrolled type 2 diabetes mellitus with hyperglycemia, with long-term current use of insulin DC summary 10/2   Management of T2DM, Hyperglycemia     Diabetes Complications include:     Hyperglycemia and Diabetic peripheral neuropathy     DM (diabetes mellitus), type 2, uncontrolled    Management of T1DM Endocrinology CN 9/24                  Endocrinology PN 10/2                                                                            Doctor, Please specify diagnosis or diagnoses associated with above clinical findings. Please clarify the conflicting diabetes documentation.    Provider Use Only      __x__type 1 diabetes mellitus with hyperglycemia, with current long-term use of insulin      _x___type 2 diabetes mellitus with hyperglycemia, with current long-term use of insulin      ____other___________________                                                                                                                         [  ] Clinically undetermined

## 2017-10-03 NOTE — TELEPHONE ENCOUNTER
Called and spoke with Lisy. She provided fax number to fax orders.  Cancelled labs originally scheduled here and faxed over lab orders to [facility] Fax [phone]

## 2017-10-09 ENCOUNTER — LAB VISIT (OUTPATIENT)
Dept: LAB | Facility: HOSPITAL | Age: 47
End: 2017-10-09
Attending: NURSE PRACTITIONER
Payer: MEDICAID

## 2017-10-09 ENCOUNTER — TELEPHONE (OUTPATIENT)
Dept: ENDOCRINOLOGY | Facility: CLINIC | Age: 47
End: 2017-10-09

## 2017-10-09 ENCOUNTER — TELEPHONE (OUTPATIENT)
Dept: PHARMACY | Facility: CLINIC | Age: 47
End: 2017-10-09

## 2017-10-09 DIAGNOSIS — E83.51 HYPOCALCEMIA: Primary | ICD-10-CM

## 2017-10-09 DIAGNOSIS — C32.9 LARYNGEAL CANCER: ICD-10-CM

## 2017-10-09 DIAGNOSIS — E83.51 HYPOCALCEMIA: ICD-10-CM

## 2017-10-09 LAB
CA-I BLDV-SCNC: 0.85 MMOL/L
PTH-INTACT SERPL-MCNC: 8 PG/ML

## 2017-10-09 PROCEDURE — 83970 ASSAY OF PARATHORMONE: CPT

## 2017-10-09 PROCEDURE — 82330 ASSAY OF CALCIUM: CPT

## 2017-10-09 PROCEDURE — 36415 COLL VENOUS BLD VENIPUNCTURE: CPT

## 2017-10-09 RX ORDER — CALCITRIOL 0.5 UG/1
0.5 CAPSULE ORAL 3 TIMES DAILY
Qty: 90 CAPSULE | Refills: 11 | Status: SHIPPED | OUTPATIENT
Start: 2017-10-09 | End: 2017-10-25 | Stop reason: SDUPTHER

## 2017-10-09 NOTE — TELEPHONE ENCOUNTER
Spoke with Lisy, Mr. Esteban's sister. Had low calcium on labs checked in [place] on 10/6. Calcium 6.7 and albumin of 3.4. Have asked her to increase calcitriol to 0.5mcg three times daily and continue calcium carbonate 1000mg TID. Will have him collected renal panel, magnesium, and ionized calcium within 10-14 days at [facility] in [place]. Requesting that he see endocrinology for an office visit at [facility] within 3-4 weeks.

## 2017-10-09 NOTE — PHYSICIAN QUERY
PT Name: Luís Esteban  MR #: [id number]    Physician Query Form - Pathology Findings Clarification     CDS/: Kimberly Harp RN  CCDS               Contact information: [email or web address]  This form is a permanent document in the medical record.     Query Date: October 9, 2017      By submitting this query, we are merely seeking further clarification of documentation.  Please utilize your independent clinical judgment when addressing the question(s) below.      The medical record contains the following:     Findings Supporting Clinical Information Location in Medical Record   Laryngeal cancer            FINAL PATHOLOGIC DIAGNOSIS  1 RIGHT NECK DISSECTION, LEVEL 2B:  BENIGN FIBROADIPOSE TISSUE.  NO LYMPH NODE IDENTIFIED.  2 RIGHT NECK DISSECTION, LEVEL 2A:  METASTATIC CARCINOMA INVOLVES 3 OF 13 LYMPH NODES.  3 RIGHT NECK DISSECTION, LEVEL 3:  4 LYMPH NODES WITH NO METASTATIC CARCINOMA IDENTIFIED.  4 RIGHT NECK DISSECTION, LEVEL 4:  METASTATIC CARCINOMA INVOLVING 1 OF 10 LYMPH NODES.  5 LEFT NECK DISSECTION, LEVEL 2a:  METASTATIC CARCINOMA INVOLVING 2 OF 8 LYMPH NODES.  FOCAL EXTRA CAPSULAR EXTENSION IDENTIFIED, 1 MM.  6 LEFT NECK DISSECTION, LEVEL 2B:  9 LYMPH NODES WITH NO METASTATIC CARCINOMA IDENTIFIED.  7 LEFT NECK DISSECTION, LEVEL 3:  7 LYMPH NODES WITH NO METASTATIC CARCINOMA IDENTIFIED.  8 LEFT NECK DISSECTION, LEVEL 4:  14 LYMPH NODES WITH NO METASTATIC CARCINOMA IDENTIFIED.  9 TISSUE AT STRAP MUSCLE:  NEGATIVE FOR MALIGNANCY.  10 TOTAL LARYNGECTOMY, TOTAL THYROIDECTOMY, RIGHT PARATRACHEAL UPPER MEDIASTINAL  (OPENED ON BACK TABLE):  INVASIVE, WELL DIFFERENTIATED SQUAMOUS CELL CARCINOMA, 5 CM IN GREATEST DIMENSION.  TUMOR INVOLVING BILATERAL ARYEPIGLOTTIC FOLDS, BILATERAL TRUE AND FALSE VOCAL CORDS.  TUMOR INVADES THROUGH THE CARTILAGE.  THYROID TISSUE WITH NO MALIGNANCY IDENTIFIED.  11 LEFT PHARYNGEAL MARGIN:  NEGATIVE FOR MALIGNANCY Discharge Summary      Pathology report 10/3     Please  document the clinical significance of the Pathologists findings of bilateral lymph node metastatic carcinoma:          [ x ] I agree with the Pathology Findings        [  ] I do not agree with the Pathology Findings        [  ] Clinically Insignificant        [  ] Clinically Undetermined        [  ] Other/Clarification of Findings: ______________________________________________    Please document in your progress notes daily for the duration of treatment until resolved and include in your discharge summary.

## 2017-10-10 ENCOUNTER — TELEPHONE (OUTPATIENT)
Dept: OTOLARYNGOLOGY | Facility: CLINIC | Age: 47
End: 2017-10-10

## 2017-10-10 DIAGNOSIS — C32.9 LARYNGEAL CANCER: Primary | ICD-10-CM

## 2017-10-10 RX ORDER — HYDROCODONE BITARTRATE AND ACETAMINOPHEN 7.5; 325 MG/15ML; MG/15ML
15 SOLUTION ORAL EVERY 4 HOURS PRN
Qty: 473 ML | Refills: 0 | Status: SHIPPED | OUTPATIENT
Start: 2017-10-10

## 2017-10-10 NOTE — TELEPHONE ENCOUNTER
Yesterday's refill of Hycet not picked up from [facility] due to insurance requiring prior authorization. Refill now authorized, but patient lives in [place]. Refill sent to pharmacy closer to patient's home.

## 2017-10-11 ENCOUNTER — TELEPHONE (OUTPATIENT)
Dept: ENDOCRINOLOGY | Facility: CLINIC | Age: 47
End: 2017-10-11

## 2017-10-11 NOTE — TELEPHONE ENCOUNTER
Spoke with patient's wife, she was informed that Dr Garcia wanted her  to follow-up with an appointment. Per wife, she stated that she had already spoken with Dr Garcia and he wanted the patient to follow-up with him in 4 weeks, Wife explained that Dr. Garcia did not have anything available until 11/20/201/7. Patient's wife has accepted that appointment.

## 2017-10-16 ENCOUNTER — TELEPHONE (OUTPATIENT)
Dept: ENDOCRINOLOGY | Facility: CLINIC | Age: 47
End: 2017-10-16

## 2017-10-16 NOTE — TELEPHONE ENCOUNTER
Lisy notified that orders were faxed to [facility] [phone] on Oct 10th and will fax them again today.

## 2017-10-16 NOTE — TELEPHONE ENCOUNTER
----- Message from Cheryle Kang sent at 10/16/2017  2:53 PM CDT -----  Contact: Lisy /  [phone]  PLS fax orders to [phone].

## 2017-10-16 NOTE — TELEPHONE ENCOUNTER
----- Message from Cheryle Kang sent at 10/16/2017  2:06 PM CDT -----  Contact: Lisy / Sister [phone]  Lisy called to verify if orders were sent to   Health system   [phone] (phone)

## 2017-10-25 ENCOUNTER — TELEPHONE (OUTPATIENT)
Dept: ENDOCRINOLOGY | Facility: CLINIC | Age: 47
End: 2017-10-25

## 2017-10-25 RX ORDER — CALCITRIOL 0.25 UG/1
0.25 CAPSULE ORAL DAILY
Qty: 30 CAPSULE | Refills: 11 | Status: SHIPPED | OUTPATIENT
Start: 2017-10-25 | End: 2017-12-21 | Stop reason: SDUPTHER

## 2017-10-25 NOTE — TELEPHONE ENCOUNTER
Spoke with jordana regarding calcium level from 10/23/17. Will decrease calcitriol to 0.25mcg in AM and 0.5mcg in afternoon and evening.  Follow-up clinic appt scheduled for 11/20/17.    Theodore Garcia MD

## 2017-11-03 ENCOUNTER — OFFICE VISIT (OUTPATIENT)
Dept: OTOLARYNGOLOGY | Facility: CLINIC | Age: 47
End: 2017-11-03
Payer: MEDICAID

## 2017-11-03 VITALS
HEART RATE: 78 BPM | BODY MASS INDEX: 18.94 KG/M2 | TEMPERATURE: 97 F | SYSTOLIC BLOOD PRESSURE: 113 MMHG | WEIGHT: 135.81 LBS | DIASTOLIC BLOOD PRESSURE: 67 MMHG

## 2017-11-03 DIAGNOSIS — C32.9 LARYNGEAL CANCER: Primary | ICD-10-CM

## 2017-11-03 DIAGNOSIS — Z90.02 S/P LARYNGECTOMY: ICD-10-CM

## 2017-11-03 PROCEDURE — 99024 POSTOP FOLLOW-UP VISIT: CPT | Mod: ,,, | Performed by: NURSE PRACTITIONER

## 2017-11-03 PROCEDURE — 99999 PR PBB SHADOW E&M-EST. PATIENT-LVL III: CPT | Mod: PBBFAC,,, | Performed by: NURSE PRACTITIONER

## 2017-11-03 PROCEDURE — 99213 OFFICE O/P EST LOW 20 MIN: CPT | Mod: PBBFAC | Performed by: NURSE PRACTITIONER

## 2017-11-06 PROBLEM — R13.12 DYSPHAGIA, OROPHARYNGEAL: Status: RESOLVED | Noted: 2017-08-31 | Resolved: 2017-11-06

## 2017-11-06 NOTE — PROGRESS NOTES
Subjective:       Patient ID: Luís Esteban is a 47 y.o. male.    Chief Complaint: post op/ wound check    Treatment History:  1) DL with biopsy, [facility], June  2) Awake trach with DL and biopsy, Dr. Deleon, 8/31/17  3) TL, bilateral MND, total thyroidectomy, pec flap, 9/22/17    HPI     Luís Esteban presents with the above treatment history. He has been doing well since his last visit. His pain is well controlled. He is breathing comfortably. He is doing well with a regular diet. He denies perioral numbness or tingling. He is taking calcium and calcitriol. Endocrine is following him. He starts radiation next week. His radiation oncologist is concerned about some scabbing to the incision. The scabbing has been there for several weeks and has lessened somewhat, but has not resolved. There is no bleeding or drainage from wounds.    He was transferred to [facility] from [facility] with stridor, hoarseness, and known laryngeal mass. Patient reports SOB, orthopnea, and hoarseness for past 2 months. He also reports about 2 months ago he was seen by ENT who performed biopsy of vocal cord mass showing benign lesion. Patient reports he was supposed to follow-up, but hasn't been able to recently. Endorses 20 pound weight loss in past 2-3 months. Also reports some difficulty eating certain foods, but no issue otherwise with swallowing. Patient was smoker, but quit 16 years ago. CT scan on disc from OSH showed laryngeal mass with airway narrowing. He was admitted to the ICU and underwent awake trach with biopsy. Path revealed SCC.    Past Medical History:   Diagnosis Date    Kidney failure     Laryngeal cancer     Substance abuse     Tracheostomy dependence     Type 2 diabetes mellitus        Past Surgical History:   Procedure Laterality Date    DIRECT LARYNGOBRONCHOSCOPY  08/31/2017         Current Outpatient Prescriptions:     calcitRIOL (ROCALTROL) 0.25 MCG Cap, Take 1 capsule (0.25 mcg  "total) by mouth once daily., Disp: 30 capsule, Rfl: 11    calcium carbonate 500 mg/5 mL (1,250 mg/5 mL), Take 10 mLs (1,000 mg total) by mouth 3 (three) times daily., Disp: 900 mL, Rfl: 2    famotidine (PEPCID) 40 mg/5 mL (8 mg/mL) suspension, Take 2.5 mLs (20 mg total) by mouth 2 (two) times daily., Disp: 150 mL, Rfl: 2    hydrocodone-acetaminophen (HYCET) solution 7.5-325 mg/15mL, Take 15 mLs by mouth every 4 (four) hours as needed., Disp: 473 mL, Rfl: 0    insulin aspart (NOVOLOG) 100 unit/mL InPn pen, Inject 0-5 Units into the skin 4 (four) times daily before meals and nightly. According to correction (sliding) scale., Disp: 1 Box, Rfl: 2    insulin aspart (NOVOLOG) 100 unit/mL InPn pen, Inject 3 Units into the skin 3 (three) times daily before meals. Give in addition to sliding scale insulin., Disp: 1 Box, Rfl: 2    insulin detemir (LEVEMIR FLEXTOUCH) 100 unit/mL (3 mL) SubQ InPn pen, Inject 3 Units into the skin 2 (two) times daily., Disp: 1 Box, Rfl: 2    levothyroxine (SYNTHROID) 112 MCG tablet, Take 1 tablet (112 mcg total) by mouth before breakfast. Take separately from other medications and food., Disp: 30 tablet, Rfl: 11    magnesium oxide (MAG-OX) 400 mg tablet, Take 1 tablet (400 mg total) by mouth 2 (two) times daily. Crush pill, Disp: 60 tablet, Rfl: 2    pen needle, diabetic (BD ULTRA-FINE OLESYA PEN NEEDLES) 32 gauge x 5/32" Ndle, To use 6 times daily with insulin injections., Disp: 200 each, Rfl: 1    Review of patient's allergies indicates:  No Known Allergies    Social History     Social History    Marital status: Single     Spouse name: N/A    Number of children: N/A    Years of education: N/A     Occupational History    Not on file.     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      Review of Systems   Constitutional: Positive for unexpected weight change. Negative for appetite change, chills, diaphoresis, fatigue and fever.   HENT: Positive for trouble swallowing and voice change. Negative for congestion, dental problem, drooling, ear discharge, ear pain, facial swelling, hearing loss, mouth sores, nosebleeds, postnasal drip, rhinorrhea, sinus pressure, sneezing, sore throat and tinnitus.    Eyes: Negative for pain, discharge, redness and itching.   Respiratory: Positive for cough. Negative for shortness of breath.    Cardiovascular: Negative for chest pain.   Gastrointestinal: Negative for abdominal distention, abdominal pain, diarrhea, nausea and vomiting.   Endocrine: Negative for cold intolerance and heat intolerance.   Genitourinary: Negative for difficulty urinating.   Musculoskeletal: Negative for neck pain and neck stiffness.   Skin: Negative for rash.   Neurological: Negative for dizziness, weakness and headaches.   Hematological: Negative for adenopathy.       Objective:      Physical Exam   Constitutional: He is oriented to person, place, and time. He is cooperative. He does not appear ill. No distress.   Neck:       Cardiovascular: Normal rate.    Pulmonary/Chest: Effort normal. No respiratory distress.   Pec flap Incision CDI.  No redness, drainage, or fluid collection noted.  Edges well approximated.    Sutures removed without difficulty.    Neurological: He is alert and oriented to person, place, and time.   Vitals reviewed.        Synoptic report  Specimen  ___ Larynx, supraglottis  ___ Larynx, glottis  ___ Larynx, subglottis  ___ bilateral thyroid  Received:  ___ Fresh  Procedure  ___ Total laryngectomy  ___ Neck (lymph node) dissection (specify): bilateral levels 2-4, right paratracheal, upper mediastinal.  ___ total thyroidectomy  Laryngectomy  ___  open  Specimen Size  ___ 11.5 x 8 x 6 cm in greatest dimensions  Tumor Site  ___ Larynx, supraglottis: Aryepiglottic folds, False vocal cord  ___ Larynx, glottis: True vocal cord  ___ Transglottic: Yes  Tumor Laterality  ___ Right  ___ Left  ___ Midline  Tumor Focality  ___ Single focus  Tumor Size  ___ 5 cm in greatest dimension:  Histologic Type  ___ Squamous cell carcinoma, conventional  Histologic Grade  ___ G1: Well differentiated  Margins  ___ Margins uninvolved by invasive carcinoma  ___ 2 mm from closest anterior margin  Lymph-Vascular Invasion  ___ not identified  Perineural Invasion  ___ Not identified  Pathologic Staging (pTNM)  ___ Primary Tumor (pT): pT4a  ___ Regional Lymph Nodes (pN): pN2c (number examined 65; number involved 6, with focal extracapsular  extension)  ___ Distant Metastasis (pM): Not applicable    Assessment:       1. Laryngeal cancer    2. S/P laryngectomy        Plan:       Problem List Items Addressed This Visit        ENT    S/P laryngectomy       Oncology    Laryngeal cancer - Primary     Luís Esteban is  1 month s/p TL, biltateral neck dissections, and pec flap. His wounds are healing. No evidence of wound breakdown or fistula. Questions answered. He will RTC 1 month after completing treatment. Sooner if needed.

## 2017-11-06 NOTE — ASSESSMENT & PLAN NOTE
Luís Esteban is 1 month s/p TL, biltateral neck dissections, and pec flap. His wounds are healing. No evidence of wound breakdown or fistula. Questions answered. He will RTC 1 month after completing treatment. Sooner if needed.

## 2017-11-07 ENCOUNTER — TELEPHONE (OUTPATIENT)
Dept: SPEECH THERAPY | Facility: HOSPITAL | Age: 47
End: 2017-11-07

## 2017-11-07 NOTE — TELEPHONE ENCOUNTER
Third phone call with [place] [employer] re: laryngectomee supplies for Mr. Esteban.  Provided additional clarification.  They would like to order them from [facility], but will likely need prescription for [employer] products vs the [employer] prescription previously provided b/c of how [employer] [facility] currently handles (or does not handle) [employer] products.  Elida Kimbrough will call back to advise what else, if anything, is needed.

## 2017-11-20 ENCOUNTER — TELEPHONE (OUTPATIENT)
Dept: ENDOCRINOLOGY | Facility: CLINIC | Age: 47
End: 2017-11-20

## 2017-11-20 NOTE — TELEPHONE ENCOUNTER
----- Message from Cheryle Kang sent at 11/20/2017  8:24 AM CST -----  Contact: Lisy /  [phone]  [place] Medicaid PT     PT had to cancel appointment due to hospitalization. .  PT needs to reschedule appointment.

## 2017-11-20 NOTE — TELEPHONE ENCOUNTER
----- Message from Cheryle Kang sent at 11/20/2017  8:24 AM CST -----  Contact: Lisy /  [phone]  [place] Medicaid PT     PT had to cancel appointment due to hospitalization. .  PT needs to reschedule appointment.

## 2017-12-06 ENCOUNTER — TELEPHONE (OUTPATIENT)
Dept: ENDOCRINOLOGY | Facility: CLINIC | Age: 47
End: 2017-12-06

## 2017-12-06 NOTE — TELEPHONE ENCOUNTER
Spoke with Lisy rocael's sister. He can't come on Mondays at all because of Chemo and then has radiation every day at 9:45am so only can come in the afternoon. I will forward to Dr Garcia to check his schedule and see where he can fit him in. Once  He lets me know, will notify our Manager Shea to schedule as  He is [place] Medicaid and I am not allowed to schedule Medicaid patients.

## 2017-12-06 NOTE — TELEPHONE ENCOUNTER
----- Message from Nikunj Salamanca sent at 12/6/2017  1:19 PM CST -----  Contact: Patient's Sister  Patient's Sister called in requesting to speak with Dr. Garcia to schedule a follow up appointment for patient. Please contact patient ASAP at [phone]. Thank You.

## 2017-12-11 NOTE — TELEPHONE ENCOUNTER
Left vm message asking if patient can come back next Tuesday Dec 19th for 1:30 with Dr Garcia.  Please call back to let me know

## 2017-12-21 ENCOUNTER — OFFICE VISIT (OUTPATIENT)
Dept: ENDOCRINOLOGY | Facility: CLINIC | Age: 47
End: 2017-12-21
Payer: MEDICAID

## 2017-12-21 ENCOUNTER — TELEPHONE (OUTPATIENT)
Dept: ENDOCRINOLOGY | Facility: CLINIC | Age: 47
End: 2017-12-21

## 2017-12-21 VITALS
WEIGHT: 131.19 LBS | HEIGHT: 71 IN | DIASTOLIC BLOOD PRESSURE: 70 MMHG | BODY MASS INDEX: 18.37 KG/M2 | SYSTOLIC BLOOD PRESSURE: 110 MMHG

## 2017-12-21 DIAGNOSIS — E89.2 POST-SURGICAL HYPOPARATHYROIDISM: Primary | ICD-10-CM

## 2017-12-21 DIAGNOSIS — E83.51 HYPOCALCEMIA: ICD-10-CM

## 2017-12-21 DIAGNOSIS — E10.65 TYPE 1 DIABETES MELLITUS WITH HYPERGLYCEMIA: Chronic | ICD-10-CM

## 2017-12-21 DIAGNOSIS — E89.0 HYPOTHYROIDISM, POSTSURGICAL: ICD-10-CM

## 2017-12-21 DIAGNOSIS — C32.9 LARYNGEAL CANCER: ICD-10-CM

## 2017-12-21 PROCEDURE — 99214 OFFICE O/P EST MOD 30 MIN: CPT | Mod: S$PBB,,, | Performed by: INTERNAL MEDICINE

## 2017-12-21 PROCEDURE — 99999 PR PBB SHADOW E&M-EST. PATIENT-LVL IV: CPT | Mod: PBBFAC,,, | Performed by: INTERNAL MEDICINE

## 2017-12-21 PROCEDURE — 99214 OFFICE O/P EST MOD 30 MIN: CPT | Mod: PBBFAC | Performed by: INTERNAL MEDICINE

## 2017-12-21 RX ORDER — CALCITRIOL 0.25 UG/1
CAPSULE ORAL
Qty: 90 CAPSULE | Refills: 11 | Status: SHIPPED | OUTPATIENT
Start: 2017-12-21

## 2017-12-21 NOTE — PROGRESS NOTES
Subjective:      Patient ID: Luís Esteban is a 47 y.o. male.    Chief Complaint: hypocalcemia     is presenting for follow up of hypocalcemia, DM type 1, postsurgical hypoparathyroidism, postsurgical hypothyroidism.    Last seen while hospitalized in Sept 2017.  Receiving oncology care in [place]. Has never seen endocrinology prior to hospitalization in Sept 2017.    Postsurgical hypoparathyroidism  Have been adjusting calcitriol as outpatient and have obtained labs. Last message per me was to decrease his calcitriol to 0.25mcg in the morning, 0.5mcg in the evening from prior 0.5mcg BID.  Today he reports only taking calcitriol 0.25mcg daily.  Also was to be on calcium carbonate 1000mg TID.  States he is only taking calcium once daily but unsure his dose.    Presented to [facility] mid December for hypoglycemia. Lisy Tamez, sister, I spoke with on the telephone during today's visit and she reports his uncorrected calcium was 6.2. She did not have an albumin available.    Endorses some cramps in his legs. Denies perioral numbness or peripheral tingling.     For postsurgical hypothyroidism, currently on levothyroxine 112mcg  Denies unintentional weight changes, heat/cold intolerance, or fatigue.    For DM, patient believed he was type 2. Reports diagnosis of diabetes in his 30's. However, on review of labs he is MINO+ and has undetectable c-peptide levels  Did not bring in bg log to today's appt  Went to [facility] last week for hypoglycemia with bg in the 40's.  Currently on tube feeds 3x daily and does not give himself insulin with feeds. Has limited po diet.    Current dm regimen  levemir 10 units daily  novolog sliding scale        Review of Systems   Constitutional: Negative for unexpected weight change.   Eyes: Negative for visual disturbance.   Respiratory: Negative for shortness of breath.    Cardiovascular: Negative for chest pain.   Gastrointestinal:  "Negative for abdominal pain.   Musculoskeletal: Positive for myalgias.   Skin: Negative for wound.   Neurological: Negative for headaches.   Hematological: Does not bruise/bleed easily.   Psychiatric/Behavioral: Negative for sleep disturbance.       Objective:     /70   Ht 5' 11" (1.803 m)   Wt 59.5 kg (131 lb 2.8 oz)   BMI 18.30 kg/m²      Physical Exam   Neck: No thyromegaly present.   Cardiovascular: Normal rate.    Pulmonary/Chest: Effort normal.   Abdominal: Soft.   Musculoskeletal:        Right foot: There is no deformity.        Left foot: There is no deformity.   Feet:   Right Foot:   Skin Integrity: Negative for ulcer.   Left Foot:   Skin Integrity: Negative for ulcer.   Neurological:   Deferred foot exam today         Vitals reviewed.      Assessment:     1. Post-surgical hypoparathyroidism    2. Hypothyroidism, postsurgical    3. Laryngeal cancer    4. Hypocalcemia    5. Type 1 diabetes mellitus with hyperglycemia        Plan:   1. Explained that he must resume previously recommended calcitriol dose of 0.25mcg in the morning and 0.5mcg in the evening. He needs to resume calcium carbonate 1000mg three times daily with food. Explained that low calcium can be fatal and that we must adjust his medications accordingly to obtain calcium in lower reference range. Will collect renal panel, mag, ionized calcium today.  2.currently on lt4 112mcg. Recheck TSH today.  3. Management per Oncology and ENT. Currently receiving XRT in [place].  4. Adjust calcitriol and calcium as above.  5. Recommend starting novolog 3 units with tube feeds. He may continue levemir 10 units daily. Explained that he is type 1 diabetic and that he cannot be off of insulin or he develop DKA. Informed sister and patient that he needs to bring bg log to all appts and provided logs for patient. Check A1C today.    Return in about 3 months (around 3/21/2018).    Discussed with Dr. Mares    [phone] Lisy Garcia MD  "

## 2017-12-21 NOTE — PROGRESS NOTES
I have reviewed and concur with the fellow's history, physical, assessment, and plan.  I have personally interviewed the patient and all questions were answered.     Patient has not been taking the recommended doses of calcitriol and calcium. Reviewed proper doses with the patient and his sister and advised him to start taking these doses today. Explained to him that low calcium can be life-threatening.

## 2017-12-21 NOTE — PATIENT INSTRUCTIONS
Take calcitriol 0.25 mcg in the morning and 0.5mcg in the evening  Take calcium carbonate 1000mg three times daily with food

## 2017-12-21 NOTE — TELEPHONE ENCOUNTER
Spoke with sister, [phone] Lisy Tamez regarding critical hypocalcemia and hyperkalemia. Informed her that he needs to be admitted for hypocalcemia to receive IV calcium. Explained to her our recommendations including continuing calcium carb 1000mg TID and calcitriol 0.25mcg AM and 0.5mcg PM.  He will likely either come to [facility] or [facility], wherever is  their way home to [place]. I have also asked Lisy to notify the [facility] endocrine office who he plans to follow-up with after hospitalization, either myself or an endocrinologist in [place] so we know whether we will continue to follow his labs.    Theodore Garcia MD

## 2018-02-05 ENCOUNTER — TELEPHONE (OUTPATIENT)
Dept: SPEECH THERAPY | Facility: HOSPITAL | Age: 48
End: 2018-02-05

## 2018-02-05 ENCOUNTER — OFFICE VISIT (OUTPATIENT)
Dept: OTOLARYNGOLOGY | Facility: CLINIC | Age: 48
End: 2018-02-05
Payer: MEDICAID

## 2018-02-05 VITALS
WEIGHT: 134.25 LBS | SYSTOLIC BLOOD PRESSURE: 99 MMHG | BODY MASS INDEX: 18.73 KG/M2 | HEART RATE: 69 BPM | DIASTOLIC BLOOD PRESSURE: 58 MMHG

## 2018-02-05 DIAGNOSIS — C32.9 LARYNGEAL CANCER: Primary | ICD-10-CM

## 2018-02-05 PROCEDURE — 3008F BODY MASS INDEX DOCD: CPT | Mod: ,,, | Performed by: OTOLARYNGOLOGY

## 2018-02-05 PROCEDURE — 31575 DIAGNOSTIC LARYNGOSCOPY: CPT | Mod: PBBFAC | Performed by: OTOLARYNGOLOGY

## 2018-02-05 PROCEDURE — 99213 OFFICE O/P EST LOW 20 MIN: CPT | Mod: 25,S$PBB,, | Performed by: OTOLARYNGOLOGY

## 2018-02-05 PROCEDURE — 31575 DIAGNOSTIC LARYNGOSCOPY: CPT | Mod: S$PBB,,, | Performed by: OTOLARYNGOLOGY

## 2018-02-05 PROCEDURE — 99999 PR PBB SHADOW E&M-EST. PATIENT-LVL II: CPT | Mod: PBBFAC,,, | Performed by: OTOLARYNGOLOGY

## 2018-02-05 PROCEDURE — 99212 OFFICE O/P EST SF 10 MIN: CPT | Mod: PBBFAC | Performed by: OTOLARYNGOLOGY

## 2018-02-05 NOTE — ASSESSMENT & PLAN NOTE
Doing well status post TL and adjuvant CRT.  I urged him to wear his aleksandra tube at all times and to only remove it to clean it.  RTC 6 weeks for surveillance.

## 2018-02-05 NOTE — PROGRESS NOTES
Chief Complaint   Patient presents with    Follow-up     1 month post treatment     Treatment History:  1) DL with biopsy, [facility], June  2) Awake trach with DL and biopsy, Dr. Deleon, 8/31/17  3) TL, bilateral MND, total thyroidectomy, pec flap for [id number] SCCA supraglottis, 9/22/17   4. Completion of adjuvant CRT. 1/18.    HPI   47 y.o. male presents with the above treatment history.  No significant complaints.  He completed adjuvant CRT 2-3 weeks ago.  He is feeling well overall.  He is requesting assistance obtaining laryngectomy supplies.     Review of Systems   Constitutional: Negative for fatigue and unexpected weight change.   HENT: Per HPI.  Eyes: Negative for visual disturbance.   Respiratory: Negative for shortness of breath, hemoptysis   Cardiovascular: Negative for chest pain and palpitations.   Musculoskeletal: Negative for decreased ROM, back pain.   Skin: Negative for rash, sunburn, itching.   Neurological: Negative for dizziness and seizures.   Hematological: Negative for adenopathy. Does not bruise/bleed easily.   Endocrine: Negative for rapid weight loss/weight gain, heat/cold intolerance.     Past Medical History   Patient Active Problem List   Diagnosis    Type 1 diabetes mellitus with hyperglycemia    Laryngeal cancer    Hypothyroidism, postsurgical    Post-surgical hypoparathyroidism    Marijuana use, continuous    Hypocalcemia    S/P laryngectomy           Past Surgical History   Past Surgical History:   Procedure Laterality Date    DIRECT LARYNGOBRONCHOSCOPY  08/31/2017         Family History   No family history on file.        Social History   .  Social History     Social History    Marital status: Single     Spouse name: N/A    Number of children: N/A    Years of education: N/A     Occupational History    Not on file.     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      General: AOx3, NAD   Respiratory:  Symmetric chest rise, normal effort  Right Ear: External Auditory Canal WNL,TM w/o masses/lesions/perforations.  Middle ear without evidence of effusion.   Left Ear: External Auditory Canal WNL,TM w/o masses/lesions/perforations.  Middle ear without evidence of effusion.   Nose: No gross nasal septal deviation. Inferior Turbinates WNL bilaterally. No septal perforation. No masses/lesions.   Oral Cavity:  Oral Tongue mobile, no lesions noted. Hard Palate WNL. No buccal or FOM lesions.  Oropharynx:  No masses/lesions of the posterior pharyngeal wall. Tonsillar fossa without lesions. Soft palate without masses. Midline uvula.   Neck: Well-healed ND scar.  No cervical lymphadenopathy, thyromegaly or thyroid nodules.  Stoma small but patent- 8/36 aleksandra tube placed.    Face: House Brackmann I bilaterally.     Assessment/Plan  Problem List Items Addressed This Visit        Oncology    Laryngeal cancer - Primary     Doing well status post TL and adjuvant CRT.  I urged him to wear his aleksandra tube at all times and to only remove it to clean it.  RTC 6 weeks for surveillance.

## 2018-02-05 NOTE — TELEPHONE ENCOUNTER
Eulalia was in Dr. Deleon's today and had questions re: laryngectomy supplies that I could not answer at that time.  Called the primary number this afternoon, and reached voicemail; left message with my call back [phone].

## 2018-03-22 ENCOUNTER — TELEPHONE (OUTPATIENT)
Dept: OTOLARYNGOLOGY | Facility: CLINIC | Age: 48
End: 2018-03-22

## 2018-03-22 ENCOUNTER — OFFICE VISIT (OUTPATIENT)
Dept: OTOLARYNGOLOGY | Facility: CLINIC | Age: 48
End: 2018-03-22
Payer: MEDICAID

## 2018-03-22 VITALS
BODY MASS INDEX: 18.57 KG/M2 | HEART RATE: 89 BPM | TEMPERATURE: 97 F | SYSTOLIC BLOOD PRESSURE: 99 MMHG | WEIGHT: 133.19 LBS | DIASTOLIC BLOOD PRESSURE: 68 MMHG

## 2018-03-22 DIAGNOSIS — C32.9 LARYNGEAL CANCER: Primary | ICD-10-CM

## 2018-03-22 DIAGNOSIS — J39.8 TRACHEAL STOMA STENOSIS: ICD-10-CM

## 2018-03-22 PROCEDURE — 31575 DIAGNOSTIC LARYNGOSCOPY: CPT | Mod: PBBFAC | Performed by: OTOLARYNGOLOGY

## 2018-03-22 PROCEDURE — 99214 OFFICE O/P EST MOD 30 MIN: CPT | Mod: 25,S$PBB,, | Performed by: OTOLARYNGOLOGY

## 2018-03-22 PROCEDURE — 99213 OFFICE O/P EST LOW 20 MIN: CPT | Mod: PBBFAC | Performed by: OTOLARYNGOLOGY

## 2018-03-22 PROCEDURE — 99999 PR PBB SHADOW E&M-EST. PATIENT-LVL III: CPT | Mod: PBBFAC,,, | Performed by: OTOLARYNGOLOGY

## 2018-03-22 PROCEDURE — 31575 DIAGNOSTIC LARYNGOSCOPY: CPT | Mod: S$PBB,,, | Performed by: OTOLARYNGOLOGY

## 2018-03-22 RX ORDER — LIDOCAINE HYDROCHLORIDE 10 MG/ML
1 INJECTION, SOLUTION EPIDURAL; INFILTRATION; INTRACAUDAL; PERINEURAL ONCE
Status: CANCELLED | OUTPATIENT
Start: 2018-03-22 | End: 2018-03-22

## 2018-03-22 NOTE — TELEPHONE ENCOUNTER
I spoke with Mr. Esteban's sister, Lisy, regarding his labs. He states that he is taking Synthroid as prescribed. He is to see his PCP, Apurva Meyer, for evaluation of his TFT. Should he worsen in the meantime, he is to proceed to the ED.

## 2018-03-22 NOTE — ASSESSMENT & PLAN NOTE
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  I explained to Mr. Esteban's sister that this stoma has likely reached its smallest size.  Though I cannot guarantee that it will remain stable, I feel that further stenosis is unlikely.  I am concerned, though, that the current size of the stoma is inadequate for him to clear secretions.  I do feel that he will need, at least, dilation of the stoma and replacement of his aleksandra tube and, possibly, formal stoma revision.      He is amenable to surgery and is scheduled for surgery on 3/28/18.  In the meantime, he is to perform regular suctioning and to make liberal use of a humidifier.      I ordered a CBC, BMP and a TSH to assess his fatigue.  I will contact him with lab results.  If he remains markedly hyperglycemic, I will arrange for him to be admitted to the hospital.

## 2018-03-22 NOTE — PROGRESS NOTES
Chief Complaint   Patient presents with    Surveillance     Treatment History:  1) DL with biopsy, [facility], June  2) Awake trach with DL and biopsy, Dr. Deleon, 8/31/17  3) TL, bilateral MND, total thyroidectomy, pec flap for [id number] SCCA supraglottis, 9/22/17   4. Completion of adjuvant CRT. 1/18.    HPI   47 y.o. male presents with the above treatment history.  He was recently admitted to the hospital in  [place] for hyperglycemia.  He was discharged yesterday.  Overall, he reports that he is somewhat lethargic.  He reports that he has not worn his laryngectomy tube for several weeks.  He feels that his stoma has grown smaller but is stable in size.  He denies SOB.  He reports that it is somewhat difficult to clear his secretions.  He developed a mucous plug while admitted to the hospital that required aggressive suction for clearance.      Review of Systems   Constitutional: Negative for fatigue and unexpected weight change.   HENT: Per HPI.  Eyes: Negative for visual disturbance.   Respiratory: Negative for shortness of breath, hemoptysis   Cardiovascular: Negative for chest pain and palpitations.   Musculoskeletal: Negative for decreased ROM, back pain.   Skin: Negative for rash, sunburn, itching.   Neurological: Negative for dizziness and seizures.   Hematological: Negative for adenopathy. Does not bruise/bleed easily.   Endocrine: Negative for rapid weight loss/weight gain, heat/cold intolerance.     Past Medical History   Patient Active Problem List   Diagnosis    Type 1 diabetes mellitus with hyperglycemia    Laryngeal cancer    Hypothyroidism, postsurgical    Post-surgical hypoparathyroidism    Marijuana use, continuous    Hypocalcemia    S/P laryngectomy           Past Surgical History   Past Surgical History:   Procedure Laterality Date    DIRECT LARYNGOBRONCHOSCOPY  08/31/2017         Family History   No family history on file.        Social History   .  Social History     Social History     Marital status: Single     Spouse name: N/A    Number of children: N/A    Years of education: N/A     Occupational History    Not on file.     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      General: AOx3, NAD   Respiratory:  Symmetric chest rise, normal effort  Right Ear: External Auditory Canal WNL,TM w/o masses/lesions/perforations.  Middle ear without evidence of effusion.   Left Ear: External Auditory Canal WNL,TM w/o masses/lesions/perforations.  Middle ear without evidence of effusion.   Nose: No gross nasal septal deviation. Inferior Turbinates WNL bilaterally. No septal perforation. No masses/lesions.   Oral Cavity:  Oral Tongue mobile, no lesions noted. Hard Palate WNL. No buccal or FOM lesions.  Oropharynx:  No masses/lesions of the posterior pharyngeal wall. Tonsillar fossa without lesions. Soft palate without masses. Midline uvula.   Neck: Well-healed ND scar.  No cervical lymphadenopathy, thyromegaly or thyroid nodules.  Stoma small but patent- unable to place 8/36 aleksandra tube.   Face: House Brackmann I bilaterally.     Flex Naso Aleksandra Hypo Procedures #2    Procedure:  Diagnostic flexible nasopharyngoscopy, laryngoscopy and hypopharyngoscopy:    Routine preparation with local atomizer with 1% neosynephrine/pontocaine with customary flexible endoscope.    Nasopharynx:  No lesions.   Mucosa:  No lesions.   Adenoids:  Present.  Posterior Choanae:  Patent.  Eustachian Tubes:  Patent.  Posterior pharynx:   No lesions.  Larynx/hypopharynx:   Epiglottis:  No lesions, without edema.   AE Folds:  No lesions.   Vocal cords:  No polyps, nodules, ulcers or lesions.   Mobility:  Equal and normal bilateral.   Hypopharynx:  No lesions.   Piriform sinus:  No pooling, no lesions.   Post Cricoid:  No erythema, no edema.      Assessment/Plan  Problem List Items Addressed This Visit        ID    Tracheal stoma stenosis     Stomal stenosis due to the effects of XRT and noncompliance with use of aleksandra tube.  I explained to Mr. Esteban's sister that this stoma has likely reached its smallest size.  Though I cannot guarantee that it will remain stable, I feel that further stenosis is unlikely.  I am concerned, though, that the current size of the stoma is inadequate for him to clear secretions.  I do feel that he will need, at least, dilation of the stoma and replacement of his aleksandra tube and, possibly, formal stoma revision.      He is amenable to surgery and is scheduled for surgery on 3/28/18.  In the meantime, he is to perform regular suctioning and to make liberal use of a humidifier.      I ordered a CBC, BMP and a TSH to assess his fatigue.  I will contact him with lab results.  If he remains markedly hyperglycemic, I will arrange for him to be admitted to the hospital.             Oncology    Laryngeal cancer - Primary     BARBARA.  Will need scans of the neck and chest in 4/18.         Relevant Orders    BASIC METABOLIC PANEL    CBC auto differential (Completed)    TSH

## 2018-03-26 ENCOUNTER — TELEPHONE (OUTPATIENT)
Dept: OTOLARYNGOLOGY | Facility: CLINIC | Age: 48
End: 2018-03-26

## 2018-03-26 NOTE — TELEPHONE ENCOUNTER
----- Message from Shakir Nieto sent at 3/26/2018  9:03 AM CDT -----  Contact: [phone]  Per pt's caretaker Lisy, pt would like to reschedule surgery on 3/28 with provider to another date next week, please call [phone] to discuss

## 2018-04-02 ENCOUNTER — ANESTHESIA EVENT (OUTPATIENT)
Dept: SURGERY | Facility: HOSPITAL | Age: 48
End: 2018-04-02

## 2018-04-02 NOTE — ANESTHESIA PREPROCEDURE EVALUATION
Anesthesia Assessment: Preoperative EQUATION     Planned Procedure: Procedure(s) (LRB):  REVISION-STOMA (N/A)  Requested Anesthesia Type:General  Surgeon: Mk Deleon MD  Service: ENT  Known or anticipated Date of Surgery:4/6/2018     Surgeon notes: reviewed     Electronic QUestionnaire Assessment completed via nurse interview with patient.      NO AQ     Triage considerations:      The patient has no apparent active cardiac condition (No unstable coronary Syndrome such as severe unstable angina or recent [<1 month] myocardial infarction, decompensated CHF, severe valvular   disease or significant arrhythmia)     [facility]  Anesthesia Hx:  No problems with previous Anesthesia   Airway/Jaw/Neck:  Airway Findings: Pre-Existing Airway Tube(s): Tracheostomy tube General Airway Assessment: Adult  TM Distance: Normal, at least 6 cm  Jaw/Neck Findings:  Neck Findings:     Airway (Surgical) Present Prior to Hospital Arrival?: No; Placement Date: 08/31/17; Placement Time: 0734; Inserted By: MD; Placed By: Other (Comment) (Dr. Deleon); Brand: Shiley; Airway Device Size: 6.0; Airway Style: Cuffed;    Last PCP note: outside [facility]   Subspecialty notes: Endocrinology      Other important co-morbidities:     Type 1 diabetes mellitus with hyperglycemia     Laryngeal cancer     Hypothyroidism, postsurgical     Post-surgical hypoparathyroidism     Marijuana use, continuous     Hypocalcemia     S/P laryngectomy     Tracheal stoma stenosis      PEG Tube      Tests already available:  Available tests,  within 3 months , within [facility] .  3/2018 T4, TSH, CBC, BMP     12/21/17 A1C     8/2017 EKG                            Instructions given. (See in Nurse's note)     Optimization:  Anesthesia Preop Clinic Assessment  Indicated-not required for this surgery     Spoke with Dr STEPHIE Franks regarding patient history/labs. Patient does not need a calcium level at this time but draw an A1C.        Plan:                Testing:  A1C   Patient lives in [place]. Order for A1C was faxed to [place] on 3/27     Navigation: Tests Scheduled. A1C in [place]                        Results will be tracked by Preop Clinic.     Zuleika Carcamo RN                         Electronically signed by Zuleika Carcamo RN at 4/2/2018  3:51 PM                                                                                                                   04/02/2018  Luís Esteban is a 47 y.o., male.    Pre-op Assessment         Review of Systems  Pulmonary:  Hx of Lung/Chest Surgery or Procedure: tracheal stent    Endocrine:  Diabetes, Type 1 Diabetes , controlled by insulin. , most recent HgA1c value was 10.0 on 12/21/17.  Thyroid Disease Hypothyroidism Post surgical hypoparathyroidism

## 2018-04-17 ENCOUNTER — TELEPHONE (OUTPATIENT)
Dept: OTOLARYNGOLOGY | Facility: CLINIC | Age: 48
End: 2018-04-17

## 2018-04-17 DIAGNOSIS — J39.8 TRACHEAL STOMA STENOSIS: Primary | ICD-10-CM

## 2018-04-17 RX ORDER — LIDOCAINE HYDROCHLORIDE 10 MG/ML
1 INJECTION, SOLUTION EPIDURAL; INFILTRATION; INTRACAUDAL; PERINEURAL ONCE
Status: CANCELLED | OUTPATIENT
Start: 2018-04-17 | End: 2018-04-17

## 2018-04-17 NOTE — TELEPHONE ENCOUNTER
----- Message from Mk Deelon MD sent at 4/17/2018 11:12 AM CDT -----  Contact: sister jordana  5/4?  ----- Message -----  From: Lashawn Fleming RN  Sent: 4/17/2018  11:05 AM  To: Mk Deleon MD    See below.  -Yuliana    ----- Message -----  From: Lidia Woodard  Sent: 4/17/2018  11:00 AM  To: [facility] Staff    [phone]-please call wants to schedule surgery patient is out of the hospital call number in message

## 2018-04-24 ENCOUNTER — ANESTHESIA EVENT (OUTPATIENT)
Dept: SURGERY | Facility: HOSPITAL | Age: 48
End: 2018-04-24

## 2018-04-24 NOTE — ANESTHESIA PREPROCEDURE EVALUATION
Anesthesia Assessment: Preoperative EQUATION     Planned Procedure: Procedure(s) (LRB):  REVISION-STOMA (N/A)  Requested Anesthesia Type:General  Surgeon: Mk Deleon MD  Service: ENT  Known or anticipated Date of Surgery:5/4/2018     Surgeon notes: reviewed     Electronic QUestionnaire Assessment completed via nurse interview with patient.      NO AQ     Triage considerations:      The patient has no apparent active cardiac condition (No unstable coronary Syndrome such as severe unstable angina or recent [<1 month] myocardial infarction, decompensated CHF, severe valvular   disease or significant arrhythmia)     Previous anesthesia records:[facility] and No problems 9/22/2017  Anesthesia Hx:  No problems with previous Anesthesia   Airway/Jaw/Neck:  Airway Findings: Pre-Existing Airway Tube(s): Tracheostomy tube General Airway Assessment: Adult  TM Distance: Normal, at least 6 cm  Jaw/Neck Findings:  Neck Findings:     Airway (Surgical) Present Prior to Hospital Arrival?: No; Placement Date: 08/31/17; Placement Time: 0734; Inserted By: MD; Placed By: Other (Comment) (Dr. Deleon); Brand: Shiley; Airway Device Size: 6.0; Airway Style: Cuffed;       Last PCP note: outside [facility]   Pending records  Subspecialty notes: Endocrinology     Other important co-morbidities:   Type 1 diabetes mellitus with hyperglycemia       Laryngeal cancer     Hypothyroidism, postsurgical     Post-surgical hypoparathyroidism     Marijuana use, continuous     Hypocalcemia     S/P laryngectomy     Tracheal stoma stenosis                                                                        PEG Tube     Tests already available:  Available tests,  within 3 months , within [facility] .     3/2018 T4, TSH, CBC, BMP     12/21/17 A1C     8/2017 EKG                            Instructions given. (See in Nurse's note)     Optimization:  Anesthesia Preop Clinic Assessment  Indicated-not required for this surgery    Medical Opinion  Indicated-Patient was hospitalized in [place] from 3/29-4/12, 2018 for abnormally increased blood glucose levels. His sister, Lisy, stated that since hospital discharge patient's blood glucose levels have been in the 130's. His co-morbidities have remained stable. Requesting hospital medical records and recent PCP medical records.                              Plan:    Testing:  Pending recent hospital medical records from [place]                           Consultation:Patient's PCP for a statement of optimization  since recent hospitalization in [place]                           Patient  has previously scheduled Medical Appointment:  none     Navigation: Tests Scheduled. none                        Results will be tracked by Preop Clinic.     Zuleika Carcamo RN                           Electronically signed by Zuleika Carcamo RN at 4/24/2018 10:32 AM                                                                                                                  04/24/2018  Luís Esteban is a 47 y.o., male.    Pre-op Assessment         Review of Systems  EENT/Dental:   Throat Disease: Cancer of, larynx Tracheal Stoma   Hepatic/GI:  Hepatic/GI Symptoms: PEG Tube   Endocrine:  Diabetes, Type 1 Diabetes , controlled by insulin.  Thyroid Disease Hypothyroidism  Parathyroid Disease, Hypoparathyroidism, post-surgical hypoparathyroidism

## 2018-05-03 ENCOUNTER — TELEPHONE (OUTPATIENT)
Dept: OTOLARYNGOLOGY | Facility: CLINIC | Age: 48
End: 2018-05-03

## 2018-05-04 ENCOUNTER — ANESTHESIA (OUTPATIENT)
Dept: SURGERY | Facility: HOSPITAL | Age: 48
End: 2018-05-04

## 2018-06-07 DIAGNOSIS — C32.9 LARYNX CANCER: Primary | ICD-10-CM

## 2018-06-21 ENCOUNTER — OFFICE VISIT (OUTPATIENT)
Dept: OTOLARYNGOLOGY | Facility: CLINIC | Age: 48
End: 2018-06-21
Payer: MEDICAID

## 2018-06-21 VITALS
WEIGHT: 142.19 LBS | DIASTOLIC BLOOD PRESSURE: 66 MMHG | BODY MASS INDEX: 19.83 KG/M2 | HEART RATE: 77 BPM | SYSTOLIC BLOOD PRESSURE: 116 MMHG

## 2018-06-21 DIAGNOSIS — C32.9 LARYNX CANCER: Primary | ICD-10-CM

## 2018-06-21 DIAGNOSIS — C32.9 LARYNGEAL CANCER: ICD-10-CM

## 2018-06-21 PROCEDURE — 31575 DIAGNOSTIC LARYNGOSCOPY: CPT | Mod: PBBFAC | Performed by: OTOLARYNGOLOGY

## 2018-06-21 PROCEDURE — 99999 PR PBB SHADOW E&M-EST. PATIENT-LVL III: CPT | Mod: PBBFAC,,, | Performed by: OTOLARYNGOLOGY

## 2018-06-21 PROCEDURE — 99213 OFFICE O/P EST LOW 20 MIN: CPT | Mod: 25,S$PBB,, | Performed by: OTOLARYNGOLOGY

## 2018-06-21 PROCEDURE — 31575 DIAGNOSTIC LARYNGOSCOPY: CPT | Mod: S$PBB,,, | Performed by: OTOLARYNGOLOGY

## 2018-06-21 PROCEDURE — 99213 OFFICE O/P EST LOW 20 MIN: CPT | Mod: PBBFAC,25 | Performed by: OTOLARYNGOLOGY

## 2018-06-26 NOTE — PROGRESS NOTES
Chief Complaint   Patient presents with    Follow-up     follow up ct scans     Treatment History:  1) DL with biopsy, [facility], June  2) Awake trach with DL and biopsy, Dr. Deleon, 8/31/17  3) TL, bilateral MND, total thyroidectomy, pec flap for [id number] SCCA supraglottis, 9/22/17   4. Completion of adjuvant CRT. 1/18.    HPI   47 y.o. male presents with the above treatment history.  He feels that his stoma has grown small but is stable in size.  He denies SOB.  He reports that it is somewhat difficult to clear his secretions.  No complaints otherwise.     Review of Systems   Constitutional: Negative for fatigue and unexpected weight change.   HENT: Per HPI.  Eyes: Negative for visual disturbance.   Respiratory: Negative for shortness of breath, hemoptysis   Cardiovascular: Negative for chest pain and palpitations.   Musculoskeletal: Negative for decreased ROM, back pain.   Skin: Negative for rash, sunburn, itching.   Neurological: Negative for dizziness and seizures.   Hematological: Negative for adenopathy. Does not bruise/bleed easily.   Endocrine: Negative for rapid weight loss/weight gain, heat/cold intolerance.     Past Medical History   Patient Active Problem List   Diagnosis    Type 1 diabetes mellitus with hyperglycemia    Laryngeal cancer    Hypothyroidism, postsurgical    Post-surgical hypoparathyroidism    Marijuana use, continuous    Hypocalcemia    S/P laryngectomy    Tracheal stoma stenosis           Past Surgical History   Past Surgical History:   Procedure Laterality Date    DIRECT LARYNGOBRONCHOSCOPY  08/31/2017         Family History   No family history on file.        Social History   .  Social History     Social History    Marital status: Single     Spouse name: N/A    Number of children: N/A    Years of education: N/A     Occupational History    Not on file.     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      General: AOx3, NAD   Respiratory:  Symmetric chest rise, normal effort  Right Ear: External Auditory Canal WNL,TM w/o masses/lesions/perforations.  Middle ear without evidence of effusion.   Left Ear: External Auditory Canal WNL,TM w/o masses/lesions/perforations.  Middle ear without evidence of effusion.   Nose: No gross nasal septal deviation. Inferior Turbinates WNL bilaterally. No septal perforation. No masses/lesions.   Oral Cavity:  Oral Tongue mobile, no lesions noted. Hard Palate WNL. No buccal or FOM lesions.  Oropharynx:  No masses/lesions of the posterior pharyngeal wall. Tonsillar fossa without lesions. Soft palate without masses. Midline uvula.   Neck: Well-healed ND scar.  No cervical lymphadenopathy, thyromegaly or thyroid nodules.  Stoma small but patent- unable to place 8/36 aleksandra tube.   Face: House Brackmann I bilaterally.     Flex Naso Aleksandra Hypo Procedures #2    Procedure:  Diagnostic flexible nasopharyngoscopy, laryngoscopy and hypopharyngoscopy:    Routine preparation with local atomizer with 1% neosynephrine/pontocaine with customary flexible endoscope.    Nasopharynx:  No lesions.   Mucosa:  No lesions.   Adenoids:  Present.  Posterior Choanae:  Patent.  Eustachian Tubes:  Patent.  Posterior pharynx:  No lesions.  Neopharynx:  No lesions.    Exam limited by patient compliance/discomfort.       Assessment/Plan  Problem List Items Addressed This Visit        Oncology    Laryngeal cancer     Clinically BARBARA.  I ordered CT scans of the neck and chest to assess for occult disease.  I  will contact his sister with CT results and will schedule stomal revision once I have determined that he is BARBARA.            Other Visit Diagnoses     Larynx cancer    -  Primary    Relevant Orders    CT Soft Tissue Neck With Contrast    CT Chest With Contrast

## 2018-06-26 NOTE — ASSESSMENT & PLAN NOTE
Clinically BARBARA.  I ordered CT scans of the neck and chest to assess for occult disease.  I will contact his sister with CT results and will schedule stomal revision once I have determined that he is BARBARA.

## 2018-08-08 NOTE — PLAN OF CARE
SW sent all necessary referral information to [facility] via [facility]. Pt was current with this agency prior to this admit. SMILEY also spoke to Pt's sister, Lisy ([phone]), who said she is involved in Pt's care, but does not live with him. Pt lives with his mother and their brother. Pt's brother was reportedly involved in caring for Pt's trach. SMILEY asked Lisy if she or Pt's sister might be able to come to the hospital to learn how to care for Pt's laryngectomy.   Pt's sister said she would be at the hospital tomorrow. SMILEY spoke to Speech Therapist, Anne Marie, who stated she was technically on tomorrow, but that she is the only ST for the entire hospital and she had to do all the new consults first. ST said if she was able to, she would stop in to see Pt, but that she could not make any guarantees. ST said Pt was a bit more participatory in his session with her today. SMILEY informed Pt's sister of this and asked if she or someone else would be able to come to the hospital on Monday for teaching. Lisy stated she was not available to do so, but that her brother and/or sister might be able to do so. SMILEY also asked Lisy to please bring Pt's home/portable suction machine to the hospital so that Pt would have it for the drive home.   SMILEY has a page into [facility] to inform them of above. SW to continue to follow.     Shirley Sierra LCSW   within normal limits

## 2018-08-24 ENCOUNTER — OFFICE VISIT (OUTPATIENT)
Dept: OTOLARYNGOLOGY | Facility: CLINIC | Age: 48
End: 2018-08-24
Payer: MEDICAID

## 2018-08-24 VITALS
DIASTOLIC BLOOD PRESSURE: 87 MMHG | WEIGHT: 144.81 LBS | BODY MASS INDEX: 20.2 KG/M2 | TEMPERATURE: 98 F | SYSTOLIC BLOOD PRESSURE: 131 MMHG | HEART RATE: 75 BPM

## 2018-08-24 DIAGNOSIS — J95.03 TRACHEAL STENOSIS AFTER PROCEDURE: Primary | ICD-10-CM

## 2018-08-24 PROCEDURE — 99214 OFFICE O/P EST MOD 30 MIN: CPT | Mod: S$PBB,,, | Performed by: OTOLARYNGOLOGY

## 2018-08-24 PROCEDURE — 99999 PR PBB SHADOW E&M-EST. PATIENT-LVL III: CPT | Mod: PBBFAC,,, | Performed by: OTOLARYNGOLOGY

## 2018-08-24 PROCEDURE — 99213 OFFICE O/P EST LOW 20 MIN: CPT | Mod: PBBFAC | Performed by: OTOLARYNGOLOGY

## 2018-08-24 NOTE — LETTER
August 26, 2018      Rick Ybarra MD  [address]  [place] [address]           [facility]  [address]  [facility] [address]  Phone: [phone]  Fax: [phone]          Patient: Luís Esteban   MR Number: [id number]   YOB: 1970   Date of Visit: 8/24/2018       Dear Dr. Rick Ybarra:    Thank you for referring Luís Esteban to me for evaluation. Attached you will find relevant portions of my assessment and plan of care.    If you have questions, please do not hesitate to call me. I look forward to following Luís Esteban along with you.    Sincerely,    Mk Deleon MD    Enclosure  CC:  No Recipients    If you would like to receive this communication electronically, please contact [email or web address] or [phone] to request more information on [email or web address] access.    For providers and/or their staff who would like to refer a patient to [facility], please contact us through our one-stop-shop provider referral line, [facility], at [phone].    If you feel you have received this communication in error or would no longer like to receive these types of communications, please e-mail [email or web address]

## 2018-08-26 PROBLEM — J95.03: Status: ACTIVE | Noted: 2018-08-26

## 2018-08-26 RX ORDER — LIDOCAINE HYDROCHLORIDE 10 MG/ML
1 INJECTION, SOLUTION EPIDURAL; INFILTRATION; INTRACAUDAL; PERINEURAL ONCE
Status: CANCELLED | OUTPATIENT
Start: 2018-08-26 | End: 2018-08-26

## 2018-08-26 NOTE — H&P (VIEW-ONLY)
Chief Complaint   Patient presents with    post CT     Treatment History:  1) DL with biopsy, [facility], June  2) Awake trach with DL and biopsy, Dr. Deleon, 8/31/17  3) TL, bilateral MND, total thyroidectomy, pec flap for [id number] SCCA supraglottis, 9/22/17   4. Completion of adjuvant CRT. 1/18.    HPI   48 y.o. male presents with the above treatment history.  He feels that his stoma has grown small but is stable in size.  He denies SOB.  He reports that it is somewhat difficult to clear his secretions.  He was recently found to have lung lesions worrisome for metastatic disease.     Review of Systems   Constitutional: Negative for fatigue and unexpected weight change.   HENT: Per HPI.  Eyes: Negative for visual disturbance.   Respiratory: Negative for shortness of breath, hemoptysis   Cardiovascular: Negative for chest pain and palpitations.   Musculoskeletal: Negative for decreased ROM, back pain.   Skin: Negative for rash, sunburn, itching.   Neurological: Negative for dizziness and seizures.   Hematological: Negative for adenopathy. Does not bruise/bleed easily.   Endocrine: Negative for rapid weight loss/weight gain, heat/cold intolerance.     Past Medical History   Patient Active Problem List   Diagnosis    Type 1 diabetes mellitus with hyperglycemia    Laryngeal cancer    Hypothyroidism, postsurgical    Post-surgical hypoparathyroidism    Marijuana use, continuous    Hypocalcemia    S/P laryngectomy    Tracheal stoma stenosis           Past Surgical History   Past Surgical History:   Procedure Laterality Date    DIRECT LARYNGOBRONCHOSCOPY  08/31/2017         Family History   History reviewed. No pertinent family history.        Social History   .  Social History     Socioeconomic History    Marital status: Single     Spouse name: Not on file    Number of children: Not on file    Years of education: Not on file    Highest education level: Not on file   Social Needs    Financial resource  strain: Not on file    Food insecurity - worry: Not on file    Food insecurity - inability: Not on file    Transportation needs - medical: Not on file    Transportation needs - non-medical: Not on file   Occupational History    Not on file   Tobacco Use    Smoking status: Former Smoker    Smokeless tobacco: Never Used   Substance and Sexual Activity    Alcohol use: No    Drug use: Yes     Frequency: 7.0 times per week     Types: Marijuana    Sexual activity: Not on file   Other Topics Concern    Patient feels they ought to cut down on drinking/drug use Not Asked    Patient annoyed by others criticizing their drinking/drug use Not Asked    Patient has felt bad or guilty about drinking/drug use Not Asked    Patient has had a drink/used drugs as an eye opener in the AM Not Asked   Social History Narrative    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      General: AOx3, NAD   Respiratory:  Symmetric chest rise, normal effort  Nose: No gross nasal septal deviation. Inferior Turbinates WNL bilaterally. No septal perforation. No masses/lesions.   Oral Cavity:  Oral Tongue mobile, no lesions noted. Hard Palate WNL. No buccal or FOM lesions.  Oropharynx:  No masses/lesions of the posterior pharyngeal wall. Tonsillar fossa without lesions. Soft palate without masses. Midline uvula.   Neck: Well-healed ND scar.  No cervical lymphadenopathy, thyromegaly or thyroid nodules.  Stoma small but patent- ~1 cm in diameter.   Face: House Brackmann I bilaterally.       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  He has findings worrisome for metastatic disease.  He is breathing well but I am concerned  that his stoma and, thus, his airway could be completely obstructed by crusted secretions.  I recommended that we proceed to the OR for stomal revision.  I explained that this procedure would involve dilation of his stoma and rearrangement of adjacent tissue in order to improve the caliber of his airway.  He understands that the risks of surgery include pain, bleeding, infection, stroke, death, recurrent stenosis and need for additional surgery.  He and his family expressed understanding.    Surgery is scheduled on 8/27/18.         Relevant Orders    Case Request Operating Room: REPAIR, STOMA (Completed)

## 2018-08-26 NOTE — PROGRESS NOTES
Chief Complaint   Patient presents with    post CT     Treatment History:  1) DL with biopsy, [facility], June  2) Awake trach with DL and biopsy, Dr. Deleon, 8/31/17  3) TL, bilateral MND, total thyroidectomy, pec flap for [id number] SCCA supraglottis, 9/22/17   4. Completion of adjuvant CRT. 1/18.    HPI   48 y.o. male presents with the above treatment history.  He feels that his stoma has grown small but is stable in size.  He denies SOB.  He reports that it is somewhat difficult to clear his secretions.  He was recently found to have lung lesions worrisome for metastatic disease.     Review of Systems   Constitutional: Negative for fatigue and unexpected weight change.   HENT: Per HPI.  Eyes: Negative for visual disturbance.   Respiratory: Negative for shortness of breath, hemoptysis   Cardiovascular: Negative for chest pain and palpitations.   Musculoskeletal: Negative for decreased ROM, back pain.   Skin: Negative for rash, sunburn, itching.   Neurological: Negative for dizziness and seizures.   Hematological: Negative for adenopathy. Does not bruise/bleed easily.   Endocrine: Negative for rapid weight loss/weight gain, heat/cold intolerance.     Past Medical History   Patient Active Problem List   Diagnosis    Type 1 diabetes mellitus with hyperglycemia    Laryngeal cancer    Hypothyroidism, postsurgical    Post-surgical hypoparathyroidism    Marijuana use, continuous    Hypocalcemia    S/P laryngectomy    Tracheal stoma stenosis           Past Surgical History   Past Surgical History:   Procedure Laterality Date    DIRECT LARYNGOBRONCHOSCOPY  08/31/2017         Family History   History reviewed. No pertinent family history.        Social History   .  Social History     Socioeconomic History    Marital status: Single     Spouse name: Not on file    Number of children: Not on file    Years of education: Not on file    Highest education level: Not on file   Social Needs    Financial resource  strain: Not on file    Food insecurity - worry: Not on file    Food insecurity - inability: Not on file    Transportation needs - medical: Not on file    Transportation needs - non-medical: Not on file   Occupational History    Not on file   Tobacco Use    Smoking status: Former Smoker    Smokeless tobacco: Never Used   Substance and Sexual Activity    Alcohol use: No    Drug use: Yes     Frequency: 7.0 times per week     Types: Marijuana    Sexual activity: Not on file   Other Topics Concern    Patient feels they ought to cut down on drinking/drug use Not Asked    Patient annoyed by others criticizing their drinking/drug use Not Asked    Patient has felt bad or guilty about drinking/drug use Not Asked    Patient has had a drink/used drugs as an eye opener in the AM Not Asked   Social History Narrative    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      General: AOx3, NAD   Respiratory:  Symmetric chest rise, normal effort  Nose: No gross nasal septal deviation. Inferior Turbinates WNL bilaterally. No septal perforation. No masses/lesions.   Oral Cavity:  Oral Tongue mobile, no lesions noted. Hard Palate WNL. No buccal or FOM lesions.  Oropharynx:  No masses/lesions of the posterior pharyngeal wall. Tonsillar fossa without lesions. Soft palate without masses. Midline uvula.   Neck: Well-healed ND scar.  No cervical lymphadenopathy, thyromegaly or thyroid nodules.  Stoma small but patent- ~1 cm in diameter.   Face: House Brackmann I bilaterally.       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  He has findings worrisome for metastatic disease.  He is breathing well but I am concerned  that his stoma and, thus, his airway could be completely obstructed by crusted secretions.  I recommended that we proceed to the OR for stomal revision.  I explained that this procedure would involve dilation of his stoma and rearrangement of adjacent tissue in order to improve the caliber of his airway.  He understands that the risks of surgery include pain, bleeding, infection, stroke, death, recurrent stenosis and need for additional surgery.  He and his family expressed understanding.    Surgery is scheduled on 8/27/18.         Relevant Orders    Case Request Operating Room: REPAIR, STOMA (Completed)

## 2018-08-26 NOTE — ASSESSMENT & PLAN NOTE
Stomal stenosis status post TL.  He has findings worrisome for metastatic disease.  He is breathing well but I am concerned that his stoma and, thus, his airway could be completely obstructed by crusted secretions.  I recommended that we proceed to the OR for stomal revision.  I explained that this procedure would involve dilation of his stoma and rearrangement of adjacent tissue in order to improve the caliber of his airway.  He understands that the risks of surgery include pain, bleeding, infection, stroke, death, recurrent stenosis and need for additional surgery.  He and his family expressed understanding.    Surgery is scheduled on 8/27/18.

## 2018-08-28 ENCOUNTER — NURSE TRIAGE (OUTPATIENT)
Dept: ADMINISTRATIVE | Facility: CLINIC | Age: 48
End: 2018-08-28

## 2018-08-28 ENCOUNTER — ANESTHESIA EVENT (OUTPATIENT)
Dept: SURGERY | Facility: HOSPITAL | Age: 48
End: 2018-08-28
Payer: MEDICAID

## 2018-08-28 ENCOUNTER — HOSPITAL ENCOUNTER (OUTPATIENT)
Facility: HOSPITAL | Age: 48
Discharge: HOME | End: 2018-08-28
Attending: OTOLARYNGOLOGY | Admitting: OTOLARYNGOLOGY
Payer: MEDICAID

## 2018-08-28 ENCOUNTER — ANESTHESIA (OUTPATIENT)
Dept: SURGERY | Facility: HOSPITAL | Age: 48
End: 2018-08-28
Payer: MEDICAID

## 2018-08-28 VITALS
OXYGEN SATURATION: 98 % | HEIGHT: 71 IN | BODY MASS INDEX: 19.6 KG/M2 | TEMPERATURE: 98 F | WEIGHT: 140 LBS | SYSTOLIC BLOOD PRESSURE: 123 MMHG | RESPIRATION RATE: 18 BRPM | HEART RATE: 70 BPM | DIASTOLIC BLOOD PRESSURE: 83 MMHG

## 2018-08-28 DIAGNOSIS — J39.8 TRACHEAL STOMA STENOSIS: Primary | ICD-10-CM

## 2018-08-28 DIAGNOSIS — J95.03 TRACHEAL STENOSIS AFTER PROCEDURE: ICD-10-CM

## 2018-08-28 LAB
POCT GLUCOSE: 227 MG/DL (ref 70–110)
POCT GLUCOSE: 251 MG/DL (ref 70–110)

## 2018-08-28 PROCEDURE — 71000015 HC POSTOP RECOV 1ST HR: Performed by: OTOLARYNGOLOGY

## 2018-08-28 PROCEDURE — D9220A PRA ANESTHESIA: Mod: CRNA,,, | Performed by: NURSE ANESTHETIST, CERTIFIED REGISTERED

## 2018-08-28 PROCEDURE — 71000039 HC RECOVERY, EACH ADD'L HOUR: Performed by: OTOLARYNGOLOGY

## 2018-08-28 PROCEDURE — 25000003 PHARM REV CODE 250: Performed by: OTOLARYNGOLOGY

## 2018-08-28 PROCEDURE — 82962 GLUCOSE BLOOD TEST: CPT | Performed by: OTOLARYNGOLOGY

## 2018-08-28 PROCEDURE — 36000707: Performed by: OTOLARYNGOLOGY

## 2018-08-28 PROCEDURE — D9220A PRA ANESTHESIA: Mod: ANES,,, | Performed by: ANESTHESIOLOGY

## 2018-08-28 PROCEDURE — 37000008 HC ANESTHESIA 1ST 15 MINUTES: Performed by: OTOLARYNGOLOGY

## 2018-08-28 PROCEDURE — 27201423 OPTIME MED/SURG SUP & DEVICES STERILE SUPPLY: Performed by: OTOLARYNGOLOGY

## 2018-08-28 PROCEDURE — 31614 TRACHEOSTOMA REVJ COMPLEX: CPT | Mod: ,,, | Performed by: OTOLARYNGOLOGY

## 2018-08-28 PROCEDURE — 63600175 PHARM REV CODE 636 W HCPCS: Performed by: OTOLARYNGOLOGY

## 2018-08-28 PROCEDURE — 71000033 HC RECOVERY, INTIAL HOUR: Performed by: OTOLARYNGOLOGY

## 2018-08-28 PROCEDURE — 37000009 HC ANESTHESIA EA ADD 15 MINS: Performed by: OTOLARYNGOLOGY

## 2018-08-28 PROCEDURE — 63600175 PHARM REV CODE 636 W HCPCS: Performed by: NURSE ANESTHETIST, CERTIFIED REGISTERED

## 2018-08-28 PROCEDURE — 36000706: Performed by: OTOLARYNGOLOGY

## 2018-08-28 PROCEDURE — 25000003 PHARM REV CODE 250: Performed by: NURSE ANESTHETIST, CERTIFIED REGISTERED

## 2018-08-28 PROCEDURE — C1726 CATH, BAL DIL, NON-VASCULAR: HCPCS | Performed by: OTOLARYNGOLOGY

## 2018-08-28 RX ORDER — HYDROCODONE BITARTRATE AND ACETAMINOPHEN 7.5; 325 MG/15ML; MG/15ML
15 SOLUTION ORAL EVERY 6 HOURS PRN
Qty: 250 ML | Refills: 0 | Status: SHIPPED | OUTPATIENT
Start: 2018-08-28

## 2018-08-28 RX ORDER — CEPHALEXIN 500 MG/1
500 CAPSULE ORAL EVERY 6 HOURS
Qty: 14 CAPSULE | Refills: 0 | Status: SHIPPED | OUTPATIENT
Start: 2018-08-28

## 2018-08-28 RX ORDER — METOCLOPRAMIDE HYDROCHLORIDE 5 MG/ML
10 INJECTION INTRAMUSCULAR; INTRAVENOUS EVERY 10 MIN PRN
Status: DISCONTINUED | OUTPATIENT
Start: 2018-08-28 | End: 2018-08-28 | Stop reason: HOSPADM

## 2018-08-28 RX ORDER — MIDAZOLAM HYDROCHLORIDE 1 MG/ML
INJECTION, SOLUTION INTRAMUSCULAR; INTRAVENOUS
Status: DISCONTINUED | OUTPATIENT
Start: 2018-08-28 | End: 2018-08-28

## 2018-08-28 RX ORDER — HYDROCODONE BITARTRATE AND ACETAMINOPHEN 7.5; 325 MG/15ML; MG/15ML
15 SOLUTION ORAL ONCE
Status: COMPLETED | OUTPATIENT
Start: 2018-08-28 | End: 2018-08-28

## 2018-08-28 RX ORDER — SODIUM CHLORIDE 9 MG/ML
INJECTION, SOLUTION INTRAVENOUS CONTINUOUS PRN
Status: DISCONTINUED | OUTPATIENT
Start: 2018-08-28 | End: 2018-08-28

## 2018-08-28 RX ORDER — PROPOFOL 10 MG/ML
VIAL (ML) INTRAVENOUS
Status: DISCONTINUED | OUTPATIENT
Start: 2018-08-28 | End: 2018-08-28

## 2018-08-28 RX ORDER — LIDOCAINE HYDROCHLORIDE 10 MG/ML
1 INJECTION, SOLUTION EPIDURAL; INFILTRATION; INTRACAUDAL; PERINEURAL ONCE
Status: DISCONTINUED | OUTPATIENT
Start: 2018-08-28 | End: 2018-08-28 | Stop reason: HOSPADM

## 2018-08-28 RX ORDER — SODIUM CHLORIDE 9 MG/ML
INJECTION, SOLUTION INTRAVENOUS CONTINUOUS
Status: DISCONTINUED | OUTPATIENT
Start: 2018-08-28 | End: 2018-08-28 | Stop reason: HOSPADM

## 2018-08-28 RX ORDER — SODIUM CHLORIDE 0.9 % (FLUSH) 0.9 %
3 SYRINGE (ML) INJECTION
Status: DISCONTINUED | OUTPATIENT
Start: 2018-08-28 | End: 2018-08-28 | Stop reason: HOSPADM

## 2018-08-28 RX ORDER — LORAZEPAM 2 MG/ML
0.25 INJECTION INTRAMUSCULAR ONCE AS NEEDED
Status: DISCONTINUED | OUTPATIENT
Start: 2018-08-28 | End: 2018-08-28 | Stop reason: HOSPADM

## 2018-08-28 RX ORDER — CEFAZOLIN SODIUM 1 G/3ML
2 INJECTION, POWDER, FOR SOLUTION INTRAMUSCULAR; INTRAVENOUS
Status: COMPLETED | OUTPATIENT
Start: 2018-08-28 | End: 2018-08-28

## 2018-08-28 RX ORDER — FENTANYL CITRATE 50 UG/ML
INJECTION, SOLUTION INTRAMUSCULAR; INTRAVENOUS
Status: DISCONTINUED | OUTPATIENT
Start: 2018-08-28 | End: 2018-08-28

## 2018-08-28 RX ORDER — HYDROMORPHONE HYDROCHLORIDE 1 MG/ML
0.2 INJECTION, SOLUTION INTRAMUSCULAR; INTRAVENOUS; SUBCUTANEOUS EVERY 5 MIN PRN
Status: DISCONTINUED | OUTPATIENT
Start: 2018-08-28 | End: 2018-08-28 | Stop reason: HOSPADM

## 2018-08-28 RX ORDER — LIDOCAINE HYDROCHLORIDE AND EPINEPHRINE 10; 10 MG/ML; UG/ML
INJECTION, SOLUTION INFILTRATION; PERINEURAL
Status: DISCONTINUED | OUTPATIENT
Start: 2018-08-28 | End: 2018-08-28 | Stop reason: HOSPADM

## 2018-08-28 RX ORDER — PHENYLEPHRINE HYDROCHLORIDE 10 MG/ML
INJECTION INTRAVENOUS
Status: DISCONTINUED | OUTPATIENT
Start: 2018-08-28 | End: 2018-08-28

## 2018-08-28 RX ADMIN — PHENYLEPHRINE HYDROCHLORIDE 200 MCG: 10 INJECTION INTRAVENOUS at 11:08

## 2018-08-28 RX ADMIN — PROPOFOL 150 MG: 10 INJECTION, EMULSION INTRAVENOUS at 11:08

## 2018-08-28 RX ADMIN — HYDROCODONE BITARTRATE AND ACETAMINOPHEN 15 ML: 7.5; 325 SOLUTION ORAL at 01:08

## 2018-08-28 RX ADMIN — FENTANYL CITRATE 50 MCG: 50 INJECTION, SOLUTION INTRAMUSCULAR; INTRAVENOUS at 11:08

## 2018-08-28 RX ADMIN — SODIUM CHLORIDE: 0.9 INJECTION, SOLUTION INTRAVENOUS at 11:08

## 2018-08-28 RX ADMIN — CEFAZOLIN 2 G: 330 INJECTION, POWDER, FOR SOLUTION INTRAMUSCULAR; INTRAVENOUS at 11:08

## 2018-08-28 RX ADMIN — MIDAZOLAM HYDROCHLORIDE 2 MG: 1 INJECTION, SOLUTION INTRAMUSCULAR; INTRAVENOUS at 11:08

## 2018-08-28 NOTE — ANESTHESIA PREPROCEDURE EVALUATION
08/28/2018  Luís Esteban is a 48 y.o., male.    Anesthesia Evaluation    I have reviewed the Patient Summary Reports.    I have reviewed the Nursing Notes.   I have reviewed the Medications.     Review of Systems  Anesthesia Hx:  No problems with previous Anesthesia  History of prior surgery of interest to airway management or planning: Previous anesthesia: General  Denies Personal Hx of Anesthesia complications.   Cardiovascular:  Cardiovascular Normal     Pulmonary:  Pulmonary Normal    Renal/:   Chronic Renal Disease    Hepatic/GI:  Hepatic/GI Normal    Neurological:  Neurology Normal    Endocrine:   Diabetes, type 2 Hypothyroidism        Physical Exam  General:  Well nourished    Airway/Jaw/Neck:  Airway Findings: Mouth Opening: Normal Tongue: Normal  General Airway Assessment: Adult  Mallampati: II  TM Distance: Normal, at least 6 cm  Jaw/Neck Findings:  Neck ROM: Normal ROM      Dental:  Dental Findings: In tact   Chest/Lungs:  Chest/Lungs Findings: Clear to auscultation     Heart/Vascular:  Heart Findings: Rate: Normal  Rhythm: Regular Rhythm  Sounds: Normal        Mental Status:  Mental Status Findings:  Cooperative, Alert and Oriented         Anesthesia Plan  Type of Anesthesia, risks & benefits discussed:  Anesthesia Type:  general  Patient's Preference: mac  Intra-op Monitoring Plan: standard ASA monitors  Intra-op Monitoring Plan Comments:   Post Op Pain Control Plan: per primary service following discharge from PACU  Post Op Pain Control Plan Comments:   Induction:   IV  Beta Blocker:  Patient is not currently on a Beta-Blocker (No further documentation required).       Informed Consent: Patient understands risks and agrees with Anesthesia plan.  Questions answered. Anesthesia consent signed with patient.  ASA Score: 3     Day of Surgery Review of History & Physical:    H&P update  referred to the surgeon.         Ready For Surgery From Anesthesia Perspective.

## 2018-08-28 NOTE — TRANSFER OF CARE
"Anesthesia Transfer of Care Note    Patient: Luís Esteban    Procedure(s) Performed: Procedure(s) (LRB):  REPAIR, STOMA (N/A)  REMOVAL, PEG TUBE    Patient location: PACU    Anesthesia Type: general    Transport from OR: Transported from OR on room air with adequate spontaneous ventilation. Transported from OR on 6-10 L/min O2 by face mask with adequate spontaneous ventilation    Post pain: adequate analgesia    Post assessment: no apparent anesthetic complications and tolerated procedure well    Post vital signs: stable    Level of consciousness: awake and alert    Nausea/Vomiting: no nausea/vomiting    Complications: none    Transfer of care protocol was followed      Last vitals:   Visit Vitals  /76 (BP Location: Left arm, Patient Position: Lying)   Pulse 70   Temp 36.4 °C (97.6 °F) (Oral)   Resp 17   Ht 5' 11" (1.803 m)   Wt 63.5 kg (140 lb)   SpO2 100%   BMI 19.53 kg/m²     "

## 2018-08-28 NOTE — PLAN OF CARE
Patient and family state they are ready to be discharged. Instructions and prescription given to patient and family. Both verbalize understanding. Patient tolerating po liquids with no difficulty. Patient states pain is at a tolerable level for them. Anesthesia consent and surgical consent in chart upon patient's discharge from [facility].

## 2018-08-28 NOTE — PROGRESS NOTES
Patient's Phillip not secured with ties. Phillip fell out with forceful cough.  called to bedside to replace Phillip and secure with trach ties.

## 2018-08-28 NOTE — PROGRESS NOTES
Dr. Deleon at bedside- patient requesting to have his PEG removed during surgery- Dr. Deleon added this to surgical consent. Patient blood glucose pre op 227- Dr. Canchola and Dr. Deleon notified. No new orders received.

## 2018-08-28 NOTE — INTERVAL H&P NOTE
The patient has been examined and the H&P has been reviewed:    I concur with the findings and no changes have occurred since H&P was written.    Anesthesia/Surgery risks, benefits and alternative options discussed and understood by patient/family.          Active Hospital Problems    Diagnosis  POA    Tracheal stenosis after procedure [J95.03]  Yes      Resolved Hospital Problems   No resolved problems to display.

## 2018-08-28 NOTE — OP NOTE
DATE OF PROCEDURE:  08/28/2018    PREOPERATIVE DIAGNOSES:  1.  History of laryngeal cancer, status post total laryngectomy.  2.  Stomal stenosis.    POSTOPERATIVE DIAGNOSES:  1.  History of laryngeal cancer, status post total laryngectomy.  2.  Stomal stenosis.    PROCEDURES:  Stomal revision of tracheal stoma complex utilization flaps,   removal of PEG tube.    SURGEON:  Mk Deleon M.D.    ASSISTANT:  Jacob Brunner, M.D. (RES).    ANESTHESIA:  General endotracheal.    ESTIMATED BLOOD LOSS:  Minimal.    SPECIMENS:  None.    INDICATIONS FOR PROCEDURE:  Mr. Esteban is a 48-year-old gentleman, status   post total laryngectomy in September of last year.  He completed adjuvant   chemoradiation therapy in January 2018.  He recently presented to me with   evidence of stomal stenosis.  He denied shortness of breath, but did report   occasional difficulty clearing his secretions.  In light of the narrow nature of   his stoma, it was recommended that we proceed to the operating room for the   aforementioned procedures.  He was apprised of the risks, benefits and   alternatives to surgery.  In spite of the risks inherent to surgery, he provided   informed consent.  He also reported to me in the preoperative holding area that   he desired removal of his PEG tube, as he has not used in several months.  He   understood that should he require enteral access in the future, he may require   PEG replacement.  He expressed understanding.    PROCEDURE IN DETAIL:  The patient was taken to the operating room and placed on   the operating table in supine position.  General endotracheal anesthesia was   induced by the Anesthesia team.  The patient was intubated through a stoma with   a size 5 endotracheal tube.  With the airway secured and inferiorly based,   advancement flap was marked just inferior to the stoma extending on the upper   chest.  The neck was then prepped and draped in the standard sterile fashion.    The  intended incision sites were injected with several mL of 1% lidocaine with   epinephrine.  With the patient adequately anesthetized, the endotracheal tube   was withdrawn and the stoma was dilated with a 14-mm airway balloon.  We were   then able to place a size 6 endotracheal tube.  With the diameter of the stoma   improved, we proceeded with the formal revision.  The flap was incised in all   aspects.  Just inferior to the stoma, a rim of scar was excised and discarded.    The inferior based flap was then elevated in the subcutaneous plane.  The   anterior wall of the trachea was skeletonized and incised roughly in the 5 and 7   o'clock positions creating an inferiorly based flap, which was rotated   inferiorly and approximated to the edges of our skin flap utilizing interrupted   3-0 Vicryl suture.  The caliber of his stoma was adequate to accommodate a size   8 endotracheal tube at the conclusion of the procedure.  Once the stoma revision   had been completed, his PEG tube was removed and a sterile dressing was   applied.  The patient was then handed back to Anesthesia, awakened, extubated   and transported to Recovery in satisfactory condition.  There were no   intraoperative complications.  I was present and participated in the entire   procedure.      STEVE/ANA  dd: 08/28/2018 13:09:32 (CDT)  td: 08/28/2018 13:37:58 (CDT)  Doc ID   #[id number]  Job ID #[id number]    CC:

## 2018-08-28 NOTE — TELEPHONE ENCOUNTER
Sister stated he left hospital where dr  Had to put trach back in. When he got home he coughed and trach came out again. Denied any breathing difficulty. She wanted to know if she could just take him to the ER in [place] where they are currently.  Reason for Disposition   Nursing judgment    Protocols used: ST NO PROTOCOL CALL: SICK ADULT-A-[place]

## 2018-08-28 NOTE — BRIEF OP NOTE
[facility]  Brief Operative Note     SUMMARY     Surgery Date: 8/28/2018     Surgeon(s) and Role:     * Mk Deleon MD - Primary     * Jacob Patrick Brunner, MD - Resident - Assisting        Pre-op Diagnosis:  Tracheal stenosis after procedure [J95.03]    Post-op Diagnosis:  Post-Op Diagnosis Codes:     * Tracheal stenosis after procedure [J95.03]    Procedure(s) (LRB):  REPAIR, STOMA (N/A)    Anesthesia: General    Description of the findings of the procedure: laryngectomy stoma revision    Findings/Key Components: see operative note    Estimated Blood Loss: * No values recorded between 8/28/2018 11:34 AM and 8/28/2018 11:49 AM *         Specimens:   Specimen (12h ago, onward)    None          Discharge Note    SUMMARY     Admit Date: 8/28/2018    Discharge Date and Time:  08/28/2018 11:50 AM    Hospital Course (synopsis of major diagnoses, care, treatment, and services provided during the course of the hospital stay): POD0 s/p revision laryngectomy stoma. Procedure went without complications, discharge to home with prompt follow up.     Final Diagnosis: Post-Op Diagnosis Codes:     * Tracheal stenosis after procedure [J95.03]    Disposition: Home or Self Care    Follow Up/Patient Instructions:     Medications:  Reconciled Home Medications:      Medication List      START taking these medications    cephALEXin 500 MG capsule  Commonly known as:  KEFLEX  Take 1 capsule (500 mg total) by mouth every 6 (six) hours.        CHANGE how you take these medications    * hydrocodone-apap 7.5-325 MG/15 ML oral solution  Commonly known as:  HYCET  Take 15 mLs by mouth every 4 (four) hours as needed.  What changed:  Another medication with the same name was added. Make sure you understand how and when to take each.     * hydrocodone-apap 7.5-325 MG/15 ML oral solution  Commonly known as:  HYCET  Take 15 mLs by mouth every 6 (six) hours as needed for Pain.  What changed:  You were already taking a  "medication with the same name, and this prescription was added. Make sure you understand how and when to take each.         * This list has 2 medication(s) that are the same as other medications prescribed for you. Read the directions carefully, and ask your doctor or other care provider to review them with you.            CONTINUE taking these medications    calcitRIOL 0.25 MCG Cap  Commonly known as:  ROCALTROL  Take 0.25mcg in the morning, and 0.5mcg in the evening     calcium carbonate 500 mg/5 mL (1,250 mg/5 mL)  Take 10 mLs (1,000 mg total) by mouth 3 (three) times daily.     famotidine 40 mg/5 mL (8 mg/mL) suspension  Commonly known as:  PEPCID  Take 2.5 mLs (20 mg total) by mouth 2 (two) times daily.     * insulin aspart U-100 100 unit/mL Inpn pen  Commonly known as:  NovoLOG  Inject 0-5 Units into the skin 4 (four) times daily before meals and nightly. According to correction (sliding) scale.     * insulin aspart U-100 100 unit/mL Inpn pen  Commonly known as:  NovoLOG  Inject 3 Units into the skin 3 (three) times daily before meals. Give in addition to sliding scale insulin.     insulin detemir U-100 100 unit/mL (3 mL) Inpn pen  Commonly known as:  LEVEMIR FLEXTOUCH  Inject 3 Units into the skin 2 (two) times daily.     levothyroxine 112 MCG tablet  Commonly known as:  SYNTHROID  Take 1 tablet (112 mcg total) by mouth before breakfast. Take separately from other medications and food.     magnesium oxide 400 mg tablet  Commonly known as:  MAG-OX  Take 1 tablet (400 mg total) by mouth 2 (two) times daily. Crush pill     pen needle, diabetic 32 gauge x 5/32" Ndle  Commonly known as:  BD ULTRA-FINE OLESYA PEN NEEDLE  To use 6 times daily with insulin injections.         * This list has 2 medication(s) that are the same as other medications prescribed for you. Read the directions carefully, and ask your doctor or other care provider to review them with you.              Discharge Procedure Orders   Notify your " health care provider if you experience any of the following:  difficulty breathing or increased cough     No dressing needed     Activity as tolerated

## 2018-08-30 NOTE — ANESTHESIA POSTPROCEDURE EVALUATION
"Anesthesia Post Evaluation    Patient: Luís Esteban    Procedure(s) Performed: Procedure(s) (LRB):  REPAIR, STOMA (N/A)  REMOVAL, PEG TUBE    Final Anesthesia Type: general  Patient location during evaluation: PACU  Patient participation: Yes- Able to Participate  Level of consciousness: awake and alert and oriented  Post-procedure vital signs: reviewed and stable  Pain management: adequate  Airway patency: patent  PONV status at discharge: No PONV  Anesthetic complications: no      Cardiovascular status: hemodynamically stable  Respiratory status: unassisted, spontaneous ventilation and room air  Hydration status: euvolemic  Follow-up not needed.        Visit Vitals  /83   Pulse 70   Temp 36.8 °C (98.3 °F) (Skin)   Resp 18   Ht 5' 11" (1.803 m)   Wt 63.5 kg (140 lb)   SpO2 98%   BMI 19.53 kg/m²       Pain/Wade Score: No Data Recorded      "

## 2018-09-22 NOTE — PHYSICIAN QUERY
PT Name: Luís Esteban  MR #: [id number]     Physician Query Form - Documentation Clarification      CDS/: Monie Kramer RN                 Contact information:[email or web address]    This form is a permanent document in the medical record.     Query Date: September 5, 2017    By submitting this query, we are merely seeking further clarification of documentation. Please utilize your independent clinical judgment when addressing the question(s) below.    The Medical record reflects the following:    Supporting Clinical Findings Location in Medical Record   Pneumonia  pseudomonas on respiratory cx : pan-sensitive  Continue cefipime for now, defer to IM (consulted) for outpatient      ENT PN 9/5   Culture, Respiratory with Gram Stain  Pseudomonas Aeruginosa       Microbiology 9/1                                                                            Doctor, Please specify diagnosis or diagnoses associated with above clinical findings.    Provider Use Only      ____ pneumonia related to pseudomonas aeruginosa, present on admission      __x__ pneumonia related to pseudomonas aeruginosa, not present on admission      ____ other_________________________________                                                                                                                       [  ] Clinically undetermined             Denies the following: constitutional symptoms, visual symptoms, cardiovascular symptoms, respiratory symptoms, GI symptoms, musculoskeletal symptoms, skin symptoms, neurologic symptoms, hematologic symptoms, allergic symptoms, psychiatric symptoms  Except any pertinent positives listed.

## 2018-10-01 ENCOUNTER — OFFICE VISIT (OUTPATIENT)
Dept: OTOLARYNGOLOGY | Facility: CLINIC | Age: 48
End: 2018-10-01
Payer: MEDICAID

## 2018-10-01 VITALS
TEMPERATURE: 99 F | DIASTOLIC BLOOD PRESSURE: 60 MMHG | BODY MASS INDEX: 19 KG/M2 | WEIGHT: 136.25 LBS | SYSTOLIC BLOOD PRESSURE: 83 MMHG | HEART RATE: 96 BPM

## 2018-10-01 DIAGNOSIS — C32.9 LARYNGEAL CANCER: ICD-10-CM

## 2018-10-01 DIAGNOSIS — J39.8 TRACHEAL STOMA STENOSIS: ICD-10-CM

## 2018-10-01 DIAGNOSIS — Z90.02 S/P LARYNGECTOMY: Primary | ICD-10-CM

## 2018-10-01 PROCEDURE — 99214 OFFICE O/P EST MOD 30 MIN: CPT | Mod: PBBFAC | Performed by: NURSE PRACTITIONER

## 2018-10-01 PROCEDURE — 99024 POSTOP FOLLOW-UP VISIT: CPT | Mod: ,,, | Performed by: NURSE PRACTITIONER

## 2018-10-01 PROCEDURE — 99999 PR PBB SHADOW E&M-EST. PATIENT-LVL IV: CPT | Mod: PBBFAC,,, | Performed by: NURSE PRACTITIONER

## 2018-10-01 NOTE — PROGRESS NOTES
Chief Complaint   Patient presents with    Post-op Evaluation     Treatment History:  1) DL with biopsy, [facility], June  2) Awake trach with DL and biopsy, Dr. Deleon, 8/31/17  3) TL, bilateral MND, total thyroidectomy, pec flap for [id number] SCCA supraglottis, 9/22/17   4) Completion of adjuvant CRT. 1/18.  5) Stoma revision, 8/28/18    HPI   48 y.o. male presents with the above treatment history.  He was recently in the hospital for DKA. He was discharged Friday. He has been lethargic and fatigued since leaving the hospital. His sister states his labs and blood glucose have been ok. He is drinking mainly liquids such as Ensure. He has no pain. He is currently undergoing treatment for lung mets.    Review of Systems   Constitutional: Negative for fatigue and unexpected weight change.   HENT: Per HPI.  Eyes: Negative for visual disturbance.   Respiratory: Negative for shortness of breath, hemoptysis   Cardiovascular: Negative for chest pain and palpitations.   Musculoskeletal: Negative for decreased ROM, back pain.   Skin: Negative for rash, sunburn, itching.   Neurological: Negative for dizziness and seizures.   Hematological: Negative for adenopathy. Does not bruise/bleed easily.   Endocrine: Negative for rapid weight loss/weight gain, heat/cold intolerance.     Past Medical History   Patient Active Problem List   Diagnosis    Type 1 diabetes mellitus with hyperglycemia    Laryngeal cancer    Hypothyroidism, postsurgical    Post-surgical hypoparathyroidism    Marijuana use, continuous    Hypocalcemia    S/P laryngectomy    Tracheal stoma stenosis           Past Surgical History   Past Surgical History:   Procedure Laterality Date    DIRECT LARYNGOBRONCHOSCOPY  08/31/2017    DISSECTION-NECK Right 9/22/2017    Performed [facility] 2ND F[facility]    DISSECTION-NECK Left 9/22/2017    Per[facility] at [facility]ND [facility]    FLAP-ROTATION N/A 9/22/2017    [facility]    GASTROSTOMY TUBE PLACEMENT  2017    LARYNGECTOMY N/A 9/22/2017    [facility]    LARYNGOSCOPY N/A 9/22/2017    [facility]    LARYNGOSCOPY N/A 8/31/2017    Performed by [facility] at [facility] OR [address] [place] [address]    PEG TUBE REMOVAL  8/28/2018    Procedure: REMOVAL, PEG TUBE;  Surgeon: Mk Deleon MD;  Location: [facility] OR [address] [place] [address];  Service: ENT;;    REMOVAL, PEG TUBE  8/28/2018    Performed by Mk Deleon MD at [facility] OR [address] [place] [address]    REPAIR, STOMA N/A 8/28/2018    Performed by [facility]on MD at [facility] OR [address] [place] [address]    STOMA REVISION N/A 8/28/2018    Procedure: REPAIR, STOMA;  Surgeon: Mk Deleon MD;  Location: [facility] OR [address] [place] [address];  Service: ENT;  Laterality: N/A;  Will need airway balloons and soft tissue instruments.    THYROIDECTOMY      TRACHEOTOMY- AWAKE N/A 8/31/2017    Performed by [facility]k [facility]on MD at [facility] OR [address] [place] [address]    trachostomy placement           Family History   History reviewed. No pertinent family history.        Social History   .  Social History     Socioeconomic History    Marital status: Single     Spouse name: Not on file    Number of children: Not on file    Years of education: Not on file    Highest education level: Not on file   Social Needs    Financial resource strain: Not on file    Food insecurity - worry: Not on file    Food insecurity - inability: Not on file    Transportation needs - medical: Not on file    Transportation needs - non-medical: Not on file   Occupational History    Not on file   Tobacco Use    Smoking status: Former Smoker    Smokeless tobacco: Never Used   Substance and Sexual Activity    Alcohol use: No    Drug use: Yes     Frequency: 7.0 times per week     Types: Marijuana    Sexual activity: Not on file   Other Topics Concern    Patient feels they ought to cut down on  drinking/drug use Not Asked    Patient annoyed by others criticizing their drinking/drug use Not Asked    Patient has felt bad or guilty about drinking/drug use Not Asked    Patient has had a drink/used drugs as an eye opener in the AM Not Asked   Social History Narrative    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      General: AOx3, NAD, lethargic but responsive   Respiratory:  Symmetric chest rise, normal effort  Neck: Well-healed ND scar.  No cervical lymphadenopathy, thyromegaly or thyroid nodules.  Stoma widely patent. Incisions well healed.  Face: House Brackmann I bilaterally.       Assessment/Plan  Problem List Items Addressed This Visit        ENT    S/P laryngectomy - Primary       ID    Tracheal stoma stenosis       Oncology    Laryngeal cancer     Luís Esteban is well healed from his recent surgery. I am concerned about his fatigue and lethargy. Instructed patient and sister that he should go to the ER for evaluation. Patient refuses to go to ER and would rather go tomorrow. Told patient and sister again that it is my recommendation that he go to the ER now. However, patient still refuses. His sister states that she will continue to monitor him and his blood glucose. Encouraged patient to follow up with PCP and oncology doctors asap. RTC here in 3 months, sooner if needed.

## 2018-10-01 NOTE — LETTER
October 1, 2018      Mk Deleon MD  [address]  [facility] [address]           [facility]  [address]  [facility] [address]  Phone: [phone]  Fax: [phone]          Patient: Luís Esteban   MR Number: [id number]   YOB: 1970   Date of Visit: 10/1/2018       Dear Dr. Mk Deleon:    Thank you for referring Luís Esteban to me for evaluation. Attached you will find relevant portions of my assessment and plan of care.    If you have questions, please do not hesitate to call me. I look forward to following Luís Esteban along with you.    Sincerely,    Ryann Oneil, NP    Enclosure  CC:  No Recipients    If you would like to receive this communication electronically, please contact [email or web address] or [phone] to request more information on [employer] Link access.    For providers and/or their staff who would like to refer a patient to [facility], please contact us through our one-stop-shop provider referral line, [facility], at [phone].    If you feel you have received this communication in error or would no longer like to receive these types of communications, please e-mail [email or web address]

## 2018-10-01 NOTE — ASSESSMENT & PLAN NOTE
Luís Esteban is well healed from his recent surgery. I am concerned about his fatigue and lethargy. Instructed patient and sister that he should go to the ER for evaluation. Patient refuses to go to ER and would rather go tomorrow. Told patient and sister again that it is my recommendation that he go to the ER now. However, patient still refuses. His sister states that she will continue to monitor him and his blood glucose. Encouraged patient to follow up with PCP and oncology doctors asap. RTC here in 3 months, sooner if needed.

## 2020-01-16 NOTE — H&P (VIEW-ONLY)
Subjective:       Patient ID: Luís Esteban is a 47 y.o. male.    Chief Complaint: post op/ ankle and feet swelling since surgery    Treatment History:  1) DL with biopsy, [facility], June  2) Awake trach with DL and biopsy, Dr. Deleon, 8/31/17    HPI     Luís Esteban presents with the above treatment history. He has been doing well since being discharged home last week. His pain is well controlled. He reports frequent cough and increased mucus production. He is doing well with his tube feeds. He states that his left foot began to swell several days ago. It is painful and red. He notes that the right foot is swollen as well, but is not bothering him as much. His family is with him today.    He was transferred to [facility] from [facility] with stridor, hoarseness, and known laryngeal mass. Patient reports SOB, orthopnea, and hoarseness for past 2 months. He also reports about 2 months ago he was seen by ENT who performed biopsy of vocal cord mass showing benign lesion. Patient reports he was supposed to follow-up, but hasn't been able to recently. Endorses 20 pound weight loss in past 2-3 months. Also reports some difficulty eating certain foods, but no issue otherwise with swallowing. Patient was smoker, but quit 16 years ago. CT scan on disc from OSH showed laryngeal mass with airway narrowing. He was admitted to the ICU and underwent awake trach with biopsy 2 weeks ago. Path revealed SCC.    Past Medical History:   Diagnosis Date    Insulin dependent diabetes mellitus        History reviewed. No pertinent surgical history.      Current Outpatient Prescriptions:     insulin aspart (NOVOLOG) 100 unit/mL InPn pen, Inject 6 units with meals, 10 units with meals + bolus Tube feeding, 4 units for just bolus tube feeding. + correction scale., Disp: 1 Box, Rfl: 1    insulin detemir (LEVEMIR FLEXTOUCH) 100 unit/mL (3 mL) SubQ InPn pen, Inject 16 Units into the skin once daily., Disp: 1 Box, Rfl:  "1    levoFLOXacin (LEVAQUIN) 750 MG tablet, Take 1 tablet (750 mg total) by mouth once daily., Disp: 10 tablet, Rfl: 0    oxycodone (ROXICODONE) 5 mg/5 mL Soln, 10 mLs (10 mg total) by Per G Tube route every 3 (three) hours as needed., Disp: 473 mL, Rfl: 0    pen needle, diabetic (BD ULTRA-FINE OLESYA PEN NEEDLES) 32 gauge x 5/32" Ndle, To use 6 times daily with insulin injections., Disp: 200 each, Rfl: 1    Review of patient's allergies indicates:  No Known Allergies    Social History     Social History    Marital status: Single     Spouse name: N/A    Number of children: N/A    Years of education: N/A     Occupational History    Not on file.     Social History Main Topics    Smoking status: Former Smoker    Smokeless tobacco: Never Used    Alcohol use Not on file    Drug use: Unknown    Sexual activity: Not on file     Other Topics Concern    Not on file     Social History Narrative    No narrative on file       History reviewed. No pertinent family history.      Review of Systems   Constitutional: Positive for unexpected weight change. Negative for appetite change, chills, diaphoresis, fatigue and fever.   HENT: Positive for trouble swallowing and voice change. Negative for congestion, dental problem, drooling, ear discharge, ear pain, facial swelling, hearing loss, mouth sores, nosebleeds, postnasal drip, rhinorrhea, sinus pressure, sneezing, sore throat and tinnitus.    Eyes: Negative for pain, discharge, redness and itching.   Respiratory: Positive for cough. Negative for shortness of breath.    Cardiovascular: Negative for chest pain.   Gastrointestinal: Negative for abdominal distention, abdominal pain, diarrhea, nausea and vomiting.   Endocrine: Negative for cold intolerance and heat intolerance.   Genitourinary: Negative for difficulty urinating.   Musculoskeletal: Negative for neck pain and neck stiffness.   Skin: Negative for rash.   Neurological: Negative for dizziness, weakness and " suicidal attempt headaches.   Hematological: Negative for adenopathy.       Objective:      Physical Exam   Constitutional: He is oriented to person, place, and time. He is cooperative. He has a sickly appearance. No distress.   Neck:       Cardiovascular: Normal rate.    Pulmonary/Chest: Effort normal. No respiratory distress.   Musculoskeletal:        Right foot: There is swelling.        Left foot: There is tenderness and swelling.        Feet:    Neurological: He is alert and oriented to person, place, and time.   Vitals reviewed.      Assessment:       1. Squamous cell carcinoma of larynx        Plan:       Problem List Items Addressed This Visit        Oncology    Squamous cell carcinoma of larynx - Primary     Luís Esetban is scheduled for TL with ALT free flap with Dr. Deleon and Dr. Rose on 9/22/17. Dr. Deleon was present during this visit.    We had a long discussion regarding surgery and what to expect while in the hospital. Laryngectomy bootcamp education materials and binder provided to patient. Time was allowed for questions and they were answered to patient's and family's satisfaction. Impact Peptide 5 days pre op recommended.    We will send pt to the ER today for bilateral lower leg edema,redness, and pain.           Other Visit Diagnoses    None.

## 2023-01-15 NOTE — ASSESSMENT & PLAN NOTE
46 yo males with hx of laryngeal ca s/p laryngectomy, now with HAPNA    -Respiratory culture showing pseudomonas/gram negative rods and right lobe consolidation on chest xray  -Currently on Cefepime 2g q8h  -culture sensitive to cipro, levofloxacin with good pulmonary penetration  -Consider levofloxacin if discharging home, 750 mg daily for 10 days       Continue home medication OTC

## (undated) DEVICE — TRAY FOLEY 16FR INFECTION CONT

## (undated) DEVICE — Device

## (undated) DEVICE — BOOT AIR FLUID HEEL ADLT STD

## (undated) DEVICE — DRAPE OPMI STERILE

## (undated) DEVICE — SUT 3-0 12-18IN SILK

## (undated) DEVICE — SHEET EENT SPLIT

## (undated) DEVICE — SUT ETHILON 3-0 PS2 18 BLK

## (undated) DEVICE — SUT 9/0 5IN ETHILON BLK MON

## (undated) DEVICE — SPONGE LAP 18X18 PREWASHED

## (undated) DEVICE — GOWN SURGICAL X-LARGE

## (undated) DEVICE — DRAIN CHANNEL ROUND 15FR

## (undated) DEVICE — SEE MEDLINE ITEM 152622

## (undated) DEVICE — DRESSING ADH ISLAND 3.6 X 14

## (undated) DEVICE — GAUZE SPONGE PEANUT STRL

## (undated) DEVICE — SUT 3/0 54IN PLAIN GUT

## (undated) DEVICE — SOL NS 1000CC

## (undated) DEVICE — SUT 2/0 30IN SILK BLK BRAI

## (undated) DEVICE — DRESSING AQUACEL SACRAL 9 X 9

## (undated) DEVICE — TUBE KANGAROO FEED 12FR 109CM

## (undated) DEVICE — SUT VICRYL PLUS 3-0 SH 18IN

## (undated) DEVICE — HOOK LONE STAR BLUNT 12MM

## (undated) DEVICE — SUT MCRYL PLUS 4-0 PS2 27IN

## (undated) DEVICE — SKINMARKER & RULER REGULAR X-F

## (undated) DEVICE — CLIP MED TICALL

## (undated) DEVICE — SET DECANTER MEDICHOICE

## (undated) DEVICE — SUT SILK 3-0 RB-1 30IN BLK

## (undated) DEVICE — SUT VICRYL 3-0 27 SH

## (undated) DEVICE — STAPLER SKIN PROXIMATE WIDE

## (undated) DEVICE — NDL HYPO REG 25G X 1 1/2

## (undated) DEVICE — SEE MEDLINE ITEM 157128

## (undated) DEVICE — CATH BLLN INSPIRA AIR 14X40MM

## (undated) DEVICE — SUT COATED VICRYL 4/0 27IN

## (undated) DEVICE — SUT SILK 2-0 SH 18IN BLACK

## (undated) DEVICE — SUT VICRYL PLUS 2-0 CT1 18

## (undated) DEVICE — WARMER DRAPE STERILE LF

## (undated) DEVICE — CORD BIPOLAR 12 FOOT

## (undated) DEVICE — GUIDE MICRO-GRID SIL GRN

## (undated) DEVICE — DEVICE SINUS BLLN INFLATION

## (undated) DEVICE — MICRO CLIP

## (undated) DEVICE — CLAMP SINGLE MICRO.

## (undated) DEVICE — ELECTRODE REM PLYHSV RETURN 9

## (undated) DEVICE — PROBE CATH TEMP 16 FRFOLEY 400

## (undated) DEVICE — CONTAINER SPECIMEN STRL 4OZ

## (undated) DEVICE — SUT 2-0 SILK 30IN BLK BRAID

## (undated) DEVICE — EVACUATOR WOUND BULB 100CC

## (undated) DEVICE — KIT MEROCEL INSTRUMENT WIPE

## (undated) DEVICE — SPONGE WEC CEL SPEARS

## (undated) DEVICE — SYR 50ML CATH TIP

## (undated) DEVICE — SUCTION COAGULATOR 10FR 6IN

## (undated) DEVICE — PAD K-THERMIA 24IN X 60IN

## (undated) DEVICE — CUP MEDICINE STERILE 2OZ

## (undated) DEVICE — LUBRICANT SURGILUBE 2 OZ

## (undated) DEVICE — NDL STRAIGHT 4CM LEIBINGER

## (undated) DEVICE — ELECTRODE BLADE INSULATED 1 IN

## (undated) DEVICE — SUT 2-0 VICRYL / CT-1

## (undated) DEVICE — DRAPE STERI INSTRUMENT 1018

## (undated) DEVICE — KIT ANTIFOG

## (undated) DEVICE — DRESSING AQUACEL FOAM 3 X 3

## (undated) DEVICE — SEE MEDLINE ITEM 152487

## (undated) DEVICE — TRACH TUBE CUFF FLEX DISP 7.5

## (undated) DEVICE — TRAY MINOR GEN SURG

## (undated) DEVICE — CLIP DOUBLE MICRO.

## (undated) DEVICE — SUCTION SURGICAL FRAZR

## (undated) DEVICE — SUT SILK 2-0 PS 18IN BLACK

## (undated) DEVICE — SEALER LIGASURE CRV 18.8CM

## (undated) DEVICE — INSTRUMENT SURG SUCT FRZR W/C

## (undated) DEVICE — TRAY ENT 4/CS

## (undated) DEVICE — KIT SAHARA DRAPE DRAW/LIFT

## (undated) DEVICE — CHLORAPREP 10.5 ML APPLICATOR

## (undated) DEVICE — CATH IV INTROCAN 22G X 1

## (undated) DEVICE — SEE MEDLINE ITEM 157194

## (undated) DEVICE — KIT EVACUATOR 3-SPRING 1/8 DRN

## (undated) DEVICE — COVER LIGHT HANDLE 80/CA

## (undated) DEVICE — SEE MEDLINE ITEM 154981

## (undated) DEVICE — SUT PROLENE 5-0 PS-2 18

## (undated) DEVICE — SYR 3CC LUER LOC

## (undated) DEVICE — SUT 2-0 12-18IN SILK

## (undated) DEVICE — CATH IV INTROCAN 20G X 1.1

## (undated) DEVICE — BLADE SURG #15 CARBON STEEL

## (undated) DEVICE — ADHESIVE MASTISOL VIAL 48/BX

## (undated) DEVICE — PACK UNIVERSAL SPLIT II

## (undated) DEVICE — SUT LIGACLIP SMALL XTRA

## (undated) DEVICE — SUT 4/0 18IN GUT PLAINPS-2

## (undated) DEVICE — SEE L#133928

## (undated) DEVICE — DRAIN CHANNEL ROUND 10FR